# Patient Record
Sex: FEMALE | Race: WHITE | Employment: FULL TIME | URBAN - METROPOLITAN AREA
[De-identification: names, ages, dates, MRNs, and addresses within clinical notes are randomized per-mention and may not be internally consistent; named-entity substitution may affect disease eponyms.]

---

## 2022-04-11 ENCOUNTER — OFFICE VISIT (OUTPATIENT)
Dept: ORTHOPEDIC SURGERY | Age: 37
End: 2022-04-11
Payer: COMMERCIAL

## 2022-04-11 VITALS — BODY MASS INDEX: 25.9 KG/M2 | HEIGHT: 67 IN | WEIGHT: 165 LBS

## 2022-04-11 DIAGNOSIS — M25.511 RIGHT SHOULDER PAIN, UNSPECIFIED CHRONICITY: Primary | ICD-10-CM

## 2022-04-11 DIAGNOSIS — S43.431A SUPERIOR GLENOID LABRUM LESION OF RIGHT SHOULDER, INITIAL ENCOUNTER: ICD-10-CM

## 2022-04-11 DIAGNOSIS — M25.311 SHOULDER INSTABILITY, RIGHT: ICD-10-CM

## 2022-04-11 PROBLEM — E11.65 TYPE 2 DIABETES MELLITUS WITH HYPERGLYCEMIA, WITHOUT LONG-TERM CURRENT USE OF INSULIN (HCC): Status: ACTIVE | Noted: 2021-06-25

## 2022-04-11 PROBLEM — E78.5 HYPERLIPIDEMIA ASSOCIATED WITH TYPE 2 DIABETES MELLITUS (HCC): Status: ACTIVE | Noted: 2021-10-08

## 2022-04-11 PROBLEM — R00.2 PALPITATIONS: Status: ACTIVE | Noted: 2020-01-08

## 2022-04-11 PROBLEM — R00.0 TACHYCARDIA: Status: ACTIVE | Noted: 2020-01-08

## 2022-04-11 PROBLEM — E11.69 HYPERLIPIDEMIA ASSOCIATED WITH TYPE 2 DIABETES MELLITUS (HCC): Status: ACTIVE | Noted: 2021-10-08

## 2022-04-11 PROBLEM — F41.9 ANXIETY: Status: ACTIVE | Noted: 2020-10-06

## 2022-04-11 PROCEDURE — 99203 OFFICE O/P NEW LOW 30 MIN: CPT | Performed by: PHYSICIAN ASSISTANT

## 2022-04-11 PROCEDURE — G8427 DOCREV CUR MEDS BY ELIG CLIN: HCPCS | Performed by: PHYSICIAN ASSISTANT

## 2022-04-11 PROCEDURE — G8419 CALC BMI OUT NRM PARAM NOF/U: HCPCS | Performed by: PHYSICIAN ASSISTANT

## 2022-04-11 PROCEDURE — 1036F TOBACCO NON-USER: CPT | Performed by: PHYSICIAN ASSISTANT

## 2022-04-11 RX ORDER — MELOXICAM 15 MG/1
15 TABLET ORAL DAILY
Qty: 30 TABLET | Refills: 3 | Status: SHIPPED | OUTPATIENT
Start: 2022-04-11 | End: 2022-09-16

## 2022-04-11 RX ORDER — LIOTHYRONINE SODIUM 5 UG/1
5 TABLET ORAL 2 TIMES DAILY
COMMUNITY

## 2022-04-11 RX ORDER — LEVOTHYROXINE SODIUM 0.1 MG/1
100 TABLET ORAL DAILY
COMMUNITY
Start: 2019-06-14

## 2022-04-11 NOTE — PROGRESS NOTES
12 [facility]  History and Physical  Shoulder Pain    Date:  2022    Name:  Juan Pablo Hendrix  Address:  [address] [facility] [address]    :  1985      Age:   31 y.o.    SSN:  [id number]      Medical Record Number:  [id number]    Reason for Visit:    Shoulder Pain (NP RIGHT SHOULDER)      HPI:   Juan Pablo Hendrix is a 31 y.o. female who presents to our office today complaining of  right shoulder pain. Patient is right-handed. She currently works full-time at [employer] and is an avid . Patient reports that she has played volleyball from a very young age and into her teens and she currently plays in a competitive rec league. Patient reports that she has been having persistent right shoulder pain over the years since she was a teenager. Back then she was seen and evaluated and recalls that she got a corticosteroid injection along with formal physical therapy which provided a lot of relief. She was able to finish out her senior year without any problems. She also recalls that she never had an acute dislocation but has been told that her right shoulder is a bit loose. Over the last few years patient reports that she has been seen by the orthopedics at [facility]. She has done extensive formal physical therapy. Patient reports that when she plays volleyball he becomes very aggravated. Initially it used to bother her primarily when she would play volleyball. Now it is becoming more more persistent where is starting to interfere with any sort of repetitive movements that she does. She reports if she is cleaning her home and doing day-to-day activities she has a lot of pain. She reports that most of her pain is deep within her right shoulder. She denies any acute dislocation history however feels that at times it could sublux.   She also reports that she has had 2 regular MRIs and 1 MRI with contrast and all of which has always been normal in the past.  She denies any acute trauma to her right shoulder  Additionally she does have some radicular symptoms that go down her arm into her index finger. She also has some pain that refers up into her neck. She reports the pain to be a dull achy pain. She denies any numbness or tingling. Pain Assessment  Location of Pain: Shoulder  Location Modifiers: Right  Severity of Pain: 2  Quality of Pain: Aching  Frequency of Pain: Intermittent  Aggravating Factors: Other (Comment)  Limiting Behavior: Yes  Relieving Factors: Rest  Work-Related Injury: No  Are there other pain locations you wish to document?: No    Review of Systems:  A 14 point review of systems available in the scanned medical record as documented by the patient. The review is negative with the exception of those things mentioned in the History of Present Illness and Past Medical History. Past History:  No past medical history on file. No past surgical history on file. Current Outpatient Medications on File Prior to Visit   Medication Sig Dispense Refill    cyanocobalamin 1000 MCG tablet Take 1,000 mcg by mouth daily      levothyroxine (SYNTHROID) 100 MCG tablet Take 100 mcg by mouth daily      liothyronine (CYTOMEL) 5 MCG tablet Take 5 mcg by mouth in the morning and at bedtime       No current facility-administered medications on file prior to visit. Social History     Socioeconomic History    Marital status: Unknown     Spouse name: Not on file    Number of children: Not on file    Years of education: Not on file    Highest education level: Not on file   Occupational History    Not on file   Tobacco Use    Smoking status: Never Smoker    Smokeless tobacco: Never Used   Substance and Sexual Activity    Alcohol use:  Yes    Drug use: Never    Sexual activity: Not on file   Other Topics Concern    Not on file   Social History Narrative    Not on file     Social Determinants of Health Financial Resource Strain:     Difficulty of Paying Living Expenses: Not on file   Food Insecurity:     Worried About Running Out of Food in the Last Year: Not on file    Anival of Food in the Last Year: Not on file   Transportation Needs:     Lack of Transportation (Medical): Not on file    Lack of Transportation (Non-Medical): Not on file   Physical Activity:     Days of Exercise per Week: Not on file    Minutes of Exercise per Session: Not on file   Stress:     Feeling of Stress : Not on file   Social Connections:     Frequency of Communication with Friends and Family: Not on file    Frequency of Social Gatherings with Friends and Family: Not on file    Attends Restoration Services: Not on file    Active Member of [address] [place] [address] [employer] or Organizations: Not on file    Attends Club or Organization Meetings: Not on file    Marital Status: Not on file   Intimate Partner Violence:     Fear of Current or Ex-Partner: Not on file    Emotionally Abused: Not on file    Physically Abused: Not on file    Sexually Abused: Not on file   Housing Stability:     Unable to Pay for Housing in the Last Year: Not on file    Number of [place] in the Last Year: Not on file    Unstable Housing in the Last Year: Not on file     No family history on file. Current Medications:    Current Outpatient Medications   Medication Sig Dispense Refill    cyanocobalamin 1000 MCG tablet Take 1,000 mcg by mouth daily      levothyroxine (SYNTHROID) 100 MCG tablet Take 100 mcg by mouth daily      liothyronine (CYTOMEL) 5 MCG tablet Take 5 mcg by mouth in the morning and at bedtime      meloxicam (MOBIC) 15 MG tablet Take 1 tablet by mouth daily 30 tablet 3     No current facility-administered medications for this visit. Allergies: Allergies   Allergen Reactions    Cefaclor Hives     As a child  As a child         Physical Exam:  There were no vitals filed for this visit.   General: Stuart Landeros is a healthy and well appearing 31 y.o. female who is sitting comfortably in our office in acute distress. General Exam:   Constitutional: Patient is adequately groomed with no evidence of malnutrition  DTRs: Deep tendon reflexes are intact  Mental Status: The patient is oriented to time, place and person. The patient's mood and affect are appropriate. Lymphatic: The lymphatic examination bilaterally reveals all areas to be without enlargement or induration. Vascular: Examination reveals no swelling or calf tenderness. Peripheral pulses are palpable and 2+. Neurological: The patient has good coordination. There is no weakness or sensory deficit. Neuro: alert. Oriented X 3  Eyes: Extra-ocular muscles intact  Mouth: Oral mucosa moist. No perioral lesions  Pulm: Respirations unlabored and regular. right Shoulder Exam:  Inspection:  No gross deformities, no signs of infection. Palpation: She has no tenderness over the rotator cuff footprint, bicipital groove, AC joint or posterior joint line. She does have a posterior click with passive movement. Active Range of Motion: Forward elevation of 160, abduction of 160, external rotation with elbow at the side 50, internal rotation to the back is T7    Passive Range of Motion: Similar to active. Strength:  5/5 external/internal rotation with resistance    Special Tests: Positive dynamic shear, positive relocation test, pain with apprehension test, negative [place]'s, negative speeds, negative resisted throwing test, no Cade muscle deformity. Neurovascular: Sensation to light touch is intact, no motor deficits, palpable radial pulses 2+  Comparison left Shoulder Examination:    Additional Examinations:    Examination of the contralateral extremity does not show any tenderness, deformity or injury. Range of motion is unremarkable. There is no gross instability. There are no rashes, ulcerations or lesions.  Strength and tone are normal.    Laboratory:  No results found for any previous visit. No results found for this or any previous visit (from the past 24 hour(s)). Radiographic:  3 xray views of the right  shoulder including True AP in internal and external and axillary lateral were taken in our office today reveal no fractures, dislocations, visible tumors, or signs of acute trauma. Self assessment questionnaires including ASES and Simple Shoulder Test were completed today. Assessment:  Valeri Cheatham is a 31 y.o. female with right shoulder pain and microinstability related to a labrum pathology. Differential diagnosis includes a SLAP lesion. Impression:  Encounter Diagnoses   Name Primary?  Right shoulder pain, unspecified chronicity Yes    Shoulder instability, right     Superior glenoid labrum lesion of right shoulder, initial encounter        Office Procedures:  Orders Placed This Encounter   Procedures    XR SHOULDER RIGHT (MIN 2 VIEWS)     Standing Status:   Future     Number of Occurrences:   1     Standing Expiration Date:   4/11/2023     Order Specific Question:   Reason for exam:     Answer:   pain    MRI SHOULDER RIGHT WO CONTRAST     Standing Status:   Future     Standing Expiration Date:   4/11/2023     Scheduling Instructions:      PROSCAN      PUSH TO [employer] PACS     Order Specific Question:   Reason for exam:     Answer:   EVAL RCT VS SLAP     Order Specific Question:   What is the sedation requirement? Answer:   None       Plan:   Pertinent imaging was reviewed. The etiology, natural history, and treatment options for the disorder were discussed. The roles of activity modifications, medications, cryotherapy and heat, injections, physical therapy, and surgical interventions were all described to the patient and questions were answered. I had a lengthy discussion with the patient today. We recommend that the patient continue to do the exercises that she had learned in physical therapy.   We will go ahead and call in a prescription for meloxicam for the pain and discomfort that she has been experiencing. Additionally we would like to get a more updated MRI scan to evaluate for a labrum pathology. Once this is completed we will see her back in our office to review the results of the imaging      4/11/2022  1:33 PM      Norma Miranda, [address] [place] [address] Physician Assistant    This dictation was performed with a verbal recognition program [facility]) and it was checked for errors. It is possible that there are still dictated errors within this office note. If so, please bring any errors to my attention for an addendum. All efforts were made to ensure that this office note is accurate.

## 2022-04-28 ENCOUNTER — OFFICE VISIT (OUTPATIENT)
Dept: ORTHOPEDIC SURGERY | Age: 37
End: 2022-04-28
Payer: COMMERCIAL

## 2022-04-28 VITALS — WEIGHT: 165 LBS | HEIGHT: 67 IN | BODY MASS INDEX: 25.9 KG/M2

## 2022-04-28 DIAGNOSIS — S43.431A SUPERIOR GLENOID LABRUM LESION OF RIGHT SHOULDER, INITIAL ENCOUNTER: Primary | ICD-10-CM

## 2022-04-28 DIAGNOSIS — M25.511 RIGHT SHOULDER PAIN, UNSPECIFIED CHRONICITY: ICD-10-CM

## 2022-04-28 DIAGNOSIS — M75.21 BICEPS TENDINITIS OF RIGHT UPPER EXTREMITY: ICD-10-CM

## 2022-04-28 PROCEDURE — G8427 DOCREV CUR MEDS BY ELIG CLIN: HCPCS | Performed by: ORTHOPAEDIC SURGERY

## 2022-04-28 PROCEDURE — G8419 CALC BMI OUT NRM PARAM NOF/U: HCPCS | Performed by: ORTHOPAEDIC SURGERY

## 2022-04-28 PROCEDURE — 20610 DRAIN/INJ JOINT/BURSA W/O US: CPT | Performed by: ORTHOPAEDIC SURGERY

## 2022-04-28 PROCEDURE — 1036F TOBACCO NON-USER: CPT | Performed by: ORTHOPAEDIC SURGERY

## 2022-04-28 PROCEDURE — 99213 OFFICE O/P EST LOW 20 MIN: CPT | Performed by: ORTHOPAEDIC SURGERY

## 2022-04-28 RX ORDER — AMOXICILLIN AND CLAVULANATE POTASSIUM 875; 125 MG/1; MG/1
TABLET, FILM COATED ORAL
COMMUNITY
Start: 2022-04-21 | End: 2022-08-02

## 2022-04-28 RX ORDER — LIDOCAINE HYDROCHLORIDE 10 MG/ML
8 INJECTION, SOLUTION INFILTRATION; PERINEURAL ONCE
Status: COMPLETED | OUTPATIENT
Start: 2022-04-28 | End: 2022-04-28

## 2022-04-28 RX ORDER — TRIAMCINOLONE ACETONIDE 40 MG/ML
80 INJECTION, SUSPENSION INTRA-ARTICULAR; INTRAMUSCULAR ONCE
Status: COMPLETED | OUTPATIENT
Start: 2022-04-28 | End: 2022-04-28

## 2022-04-28 RX ADMIN — TRIAMCINOLONE ACETONIDE 80 MG: 40 INJECTION, SUSPENSION INTRA-ARTICULAR; INTRAMUSCULAR at 16:20

## 2022-04-28 RX ADMIN — LIDOCAINE HYDROCHLORIDE 8 ML: 10 INJECTION, SOLUTION INFILTRATION; PERINEURAL at 16:19

## 2022-04-28 NOTE — LETTER
Physical Therapy Rehabilitation Referral    Patient Name:  Juan Humphries      YOB: 1985    Diagnosis:    1. Superior glenoid labrum lesion of right shoulder, initial encounter    2. Right shoulder pain, unspecified chronicity    3. Biceps tendinitis of right upper extremity        Precautions:     [x] Evaluate and Treat    Post Op Instructions:  [] Continuous passive motion (CPM)  [] Elbow ROM  [x] Exercise in plane of scapula  []  Strengthening     [x] Pulley and instruction   [x] Home exercise program (copy to patient)   [] Sling when arm at risk  [] Sling or brace at all times   [] AAROM: Forward elevation to  140            [] AAROM: External rotation  To  40    [] Isometric external rotator strengthening [] AAROM: internal rotation: up the back  [x] Isometric abductor strengthening  [] AAROM: Internal abduction   [] Isometric internal rotator strengthening [] AAROM: cross-body adduction             Stretching:     Strengthening:  [x] Four quadrant (FE, ER, IR, CBA)  [x] Rotator cuff (ER, IR, Abd)  [] Forward Elevation    [] External Rotators     [] External Rotation    [] Internal Rotators  [] Internal Rotation: up/back   [] Abductors     [] Internal Rotation: supine in abduction  [] Sleeper Stretch    [] Flexors  [] Cross-body abduction    [] Extensors  [] Pendulum (FE, Abd/Add, cw/ccw)  [x] Scapular Stabilizers   [] Wall-walking (FE, Abd)        [x] Shoulder shrugs     [] Table slides (FE)                [x] Rhomboid pinch  [] Elbow (flex, ext, pron, sup)        [x] Lat.  Pull downs     [] Medial epicondylitis program       [x] Forward punch   [] Lateral epicondylitis program       [x] Internal rotators     [] Progressive resistive exercises  [] Bench Press        [] Bench press plus  Activities:     [] Lateral pull-downs  [] Rowing     [] Progressive two-hand supine press  [] Stepper/Exercise bike   [x] Biceps: curls/supination  [] Swimming  [] Water exercises    Modalities:     Return to Sport:  [x] Of Choice      [] Plyometrics  [] Ultrasound     [] Rhythmic stabilization  [] Iontophoresis    [] Core strengthening   [] Moist heat     [] Sports specific program:   [] Massage         [x] Cryotherapy      [] Electrical stimulation     [] Paraffin  [] Whirlpool  [] TENS    [x] Home exercise program (copy to patient). Perform exercises for:   15     minutes    3      times/day  [x] Supervised physical therapy  Frequency: []  1x week  [x] 2x week  [] 3x week  [] Other:   Duration: [] 2 weeks   [] 4 weeks  [x] 6 weeks  [] Other:     Additional Instructions:     Ivette Ruvalcaba MD, PhD

## 2022-04-28 NOTE — PROGRESS NOTES
[facility]  History and Physical  Shoulder Pain    Date:  2022    Name:  Daly Last  Address:  [place] [address]    :  1985      Age:   31 y.o.    SSN:  [id number]      Medical Record Number:  [id number]    Reason for Visit:    Shoulder Pain (F/U RIGHT SHOULDER MRI)      HPI:   Daly Last is a 31 y.o. female who presents to our office today for follow up of the right shoulder pain. She presents today to review the results of her MRI. There is concern that she may have had a SLAP lesion was asked to obtain the MRI. She is a very active individual and has been having increased pain especially with any increased activities. She denies any new injury since her last visit with us    HPI 2022  Patient is right-handed. She currently works full-time at [employer] and is an avid . Patient reports that she has played volleyball from a very young age and into her teens and she currently plays in a competitive rec league. Patient reports that she has been having persistent right shoulder pain over the years since she was a teenager. Back then she was seen and evaluated and recalls that she got a corticosteroid injection along with formal physical therapy which provided a lot of relief. She was able to finish out her senior year without any problems. She also recalls that she never had an acute dislocation but has been told that her right shoulder is a bit loose. Over the last few years patient reports that she has been seen by the orthopedics at [facility]. She has done extensive formal physical therapy. Patient reports that when she plays volleyball he becomes very aggravated. Initially it used to bother her primarily when she would play volleyball. Now it is becoming more more persistent where is starting to interfere with any sort of repetitive movements that she does.   She reports if she is cleaning her home and doing day-to-day activities she has a lot of pain. She reports that most of her pain is deep within her right shoulder. She denies any acute dislocation history however feels that at times it could sublux. She also reports that she has had 2 regular MRIs and 1 MRI with contrast and all of which has always been normal in the past.  She denies any acute trauma to her right shoulder    Additionally she does have some radicular symptoms that go down her arm into her index finger. She also has some pain that refers up into her neck. She reports the pain to be a dull achy pain. She denies any numbness or tingling. Pain Assessment  Location of Pain: Shoulder  Location Modifiers: Right  Severity of Pain: 1  Quality of Pain: Aching  Duration of Pain: Persistent  Frequency of Pain: Intermittent  Aggravating Factors: Other (Comment),Exercise,Straightening,Stretching  Limiting Behavior: Some  Relieving Factors: Rest  Work-Related Injury: No  Are there other pain locations you wish to document?: No    No notes on file    Review of Systems:  A 14 point review of systems available in the scanned medical record as documented by the patient. The review is negative with the exception of those things mentioned in the History of Present Illness and Past Medical History. Past Medical History:  Patient's medications, allergies, past medical, surgical, social and family histories were reviewed and updated as appropriate. Allergies: Allergies   Allergen Reactions    Cefaclor Hives     As a child  As a child         Physical Exam:  There were no vitals filed for this visit. General: Derian Tolentino is a healthy and well appearing 31 y.o. female who is sitting comfortably in our office in acute distress. Skin:  There are no skin lesions, cellulitis, or extreme edema. The patient has warm and well-perfused Bilateral upper extremities with brisk capillary refill.     Eyes: Extra-ocular muscles intact  Mouth: Oral mucosa moist. No perioral lesions  Pulm: Respirations unlabored and regular. Neuro: Alert and oriented x3    right Shoulder Exam:  Inspection:  No gross deformities, no signs of infection. Palpation:  Positive bicipital groove, negative tenderness     Active Range of Motion: Forward elevation of 160, abduction of 160, external rotation with elbow at the side 50, internal rotation to the back is T8    Passive Range of Motion: Passively forward elevation can be further increased to . Strength: External rotation with resistance with elbow at the side 5/5, internal rotation with resistance with elbow at the side 5/5,  5/5 champagne toast test/jobes    Special Tests: +1 anterior drawer test, +2 posterior drawer, 1+ inferior drawer, 1+ sulcus and it estinguesis with external rotiton, positive obrians, positive jobes, positive resisted throwing test, positive dynamic santos No Cade muscle deformity. Positive relocation with apprehension. Neurovascular: Sensation to light touch is intact, no motor deficits, palpable radial pulses 2+    left Shoulder Exam:  Inspection:  No gross deformities, no signs of infection. Palpation:  No tenderness over the bicipital groove and rotator cuff tear     Active Range of Motion: Forward elevation of 160, abduction of 160, external rotation with elbow at the side 50, internal rotation to the back is T8    Passive Range of Motion: deferred  Strength: External rotation with resistance with elbow at the side 5/5, internal rotation with resistance with elbow at the side 5/5,  5/5 champagne toast test/jobes    Special Tests: +1 anterior drawer test, +2 posterior drawer, 1+ inferior drawer, 1+ sulcus and it estinguesis with external rotiton, positive obrians, positive jobes, positive resisted throwing test, positive dynamic santos No Cade muscle deformity. Positive relocation with apprehension.      Neurovascular: Sensation to light touch is intact, no motor deficits, palpable radial pulses 2+    Radiographic:  MRI right shoulder 4/23/2022     FINDINGS:  Supraspinatus tendon is intact.       Mild/moderate infraspinatus tendinopathy.  Internal impingement-type pitting and pseudocysts of    the posterior humerus.       Otherwise, no substantial muscle atrophy.       Long biceps pulley demonstrates an intact long biceps tendon. The long biceps tendon is    anatomically positioned.  The subscapularis tendon is intact.  Delicate posterior superior    labral tear favored over isolated synovitis, 3-6 mm.  Subjacent focal synovitis versus    ill-defined paralabral cyst paralleling the free edge tip of the labrum.       Teres minor tendon is intact.       Mild AC joint capsulitis.  The acromion is unremarkable.  Coracoid is unremarkable.       No notable arthritis of the glenohumeral joint.       Capsulitis and bursitis.       Remaining muscle bulk is unremarkable.       Remaining tendons are unremarkable.       No acute OCD.  No acute fracture.  No AVN.  No large loose body.       CONCLUSION:   1. Delicate posterior superior labral tear favored over isolated synovitis, 3-6 mm.   2. Capsulitis and bursitis. Assessment:  Ruy Sender is a 31 y.o. female a left shoulder pain related to a SLAP lesion with underlying mild capsulitis. Impression:  Encounter Diagnoses   Name Primary?     Superior glenoid labrum lesion of right shoulder, initial encounter Yes    Right shoulder pain, unspecified chronicity     Biceps tendinitis of right upper extremity        Office Procedures:  Orders Placed This Encounter   Procedures   [address] [facility]     Referral Priority:   Routine     Referral Type:   Eval and Treat     Referral Reason:   Specialty Services Required     Requested Specialty:   Physical Therapy     Number of Visits Requested:   1    ND ARTHROCENTESIS ASPIR&/INJ MAJOR JT/BURSA W/O US     After discussing the risks and benefits of a corticosteroid injection, Su Quinn did state an understanding and gave verbal consent to proceed. After cleansing the injection site with Chlora-prep and using aseptic techniques,  2 CC of Kenalog-40mg/ml and 8 CC of 1% lidocaine were injected in the right glenohumeral space. She  tolerated the procedure well with no immediate adverse sequelae after the injection. A bandage was placed over the injection site. Appropriate post injections instructions were given to the patient. Plan: We were able to review the MRI with the [facility] today. Her clinical exam and her MRI continue to correlate showing a SLAP lesion. She does have some underlying inflammatory changes and at this time we recommend that she start with a trial of conservative treatment. This includes doing formal physical therapy focusing on strength and flexibility. The patient may benefit from a corticosteroid injection which she was agreeable to today. May continue take her meloxicam daily. We will see her back in 4 weeks for reassessment. If she continues to have persistent symptoms we can certainly consider arthroscopic labral repair with biceps tenodesis at that point. She was agreeable to the plan. All questions were fully answered today      4/28/2022  4:07 PM      Paulette Day PA-C  Orthopaedic Sports Medicine Physician Assistant    During this examination, I, Paulette Day PA-C, functioned as a scribe for Dr. Natalie Kelly. This dictation was performed with a verbal recognition program ([employer]) and it was checked for errors. It is possible that there are still dictated errors within this office note. If so, please bring any errors to my attention for an addendum.   All efforts were made to ensure that this office note is accurate.  ____________  FOZIA, Dr. Natalie Kelly, personally performed the services described in this documentation as described by Paulette Day PA-C in my presence, and it is both accurate and complete. Ivette Luong MD, PhD  4/28/2022

## 2022-05-03 ENCOUNTER — HOSPITAL ENCOUNTER (OUTPATIENT)
Dept: PHYSICAL THERAPY | Age: 37
Setting detail: THERAPIES SERIES
Discharge: HOME | End: 2022-05-03
Payer: COMMERCIAL

## 2022-05-03 PROCEDURE — 97110 THERAPEUTIC EXERCISES: CPT | Performed by: PHYSICAL THERAPIST

## 2022-05-03 PROCEDURE — 97161 PT EVAL LOW COMPLEX 20 MIN: CPT | Performed by: PHYSICAL THERAPIST

## 2022-05-03 NOTE — FLOWSHEET NOTE
[facility] and [address], [facility], [address]  Phone: [phone]  Fax [phone]    Physical Therapy Daily Treatment Note  Date:  5/3/2022    Patient Name:  Javon Davidson    :  1985  MRN: [id number]  Restrictions/Precautions:    Medical/Treatment Diagnosis Information:  · Diagnosis: S43.431A (ICD-10-CM) - Superior glenoid labrum lesion of right shoulder, initial encounter; M25.511 (ICD-10-CM) - Right shoulder pain, unspecified chronicity; M75.21 (ICD-10-CM) - Biceps tendinitis of right upper extremity  · Treatment Diagnosis: increased pain M25.511; decreased strength; poor posture  Insurance/Certification information:  PT Insurance Information: [facility]  Physician Information:   Vonnie Martínez MD  Has the plan of care been signed (Y/N):        []  Yes  [x]  No     Date of Patient follow up with Physician:  22      Is this a Progress Report:     []  Yes  [x]  No        If Yes:  Date Range for reporting period:  Beginning 5/3/22  Ending     Progress report will be due (10 Rx or 30 days whichever is less): 97      Recertification will be due (POC Duration  / 90 days whichever is less):  8/3/22        Visit # Insurance Allowable Auth Required   1  []  Yes []  No        Functional Scale: FOTO= 70   Date assessed:  5/3/22    Latex Allergy:  [x]NO      []YES  Preferred Language for Healthcare:   [x]English       []other:    Pain level:  4/7/10     SUBJECTIVE:  See eval    OBJECTIVE: See eval   Observation:    Test measurements:      RESTRICTIONS/PRECAUTIONS:  Labral tear    Exercises/Interventions:     Exercise/Equipment Resistance/Repetitions Other comments   Stretching/PROM     Wand     Table Slides     UE Climax     Pulleys     Pendulum          Isometrics     Retraction          Weight shift     Flexion     Abduction     External Rotation     Internal Rotation     Biceps     Triceps          PRE's     Flexion Abduction     External Rotation 3x10    Internal Rotation     Shrugs     EXT 2# 3x10    Reverse Flys     Serratus 2# 3x10    Horizontal Abd with ER Bilateral 3x10    Biceps     Triceps     Retraction Prone row 2# 3x10         Cable Column/Theraband     External Rotation     Internal Rotation Blue band 3x10    Shrugs     Lats     Ext     Flex     Scapular Retraction     BIC     TRIC     PNF          Dynamic Stability     Serratus at wall Foam roll blue band/wrist 3x10   Plyoback          Manual interventions                 Patient Education:  Access Code: [email or web address]  URL: [email or web address]  Date: 05/03/2022  Prepared by: Delilah Johns    Exercises  Sidelying Shoulder External Rotation - 1 x daily - 7 x weekly - 3 sets - 10 reps  Supine Scapular Protraction in Flexion with Dumbbells - 1 x daily - 7 x weekly - 3 sets - 10 reps  Prone Shoulder Extension - Single Arm - 1 x daily - 7 x weekly - 3 sets - 10 reps  Prone Shoulder Row - 1 x daily - 7 x weekly - 3 sets - 10 reps  Prone Shoulder Horizontal Abduction with Thumbs Up - 1 x daily - 7 x weekly - 3 sets - 10 reps  Shoulder Internal Rotation with Resistance - 1 x daily - 7 x weekly - 3 sets - 10 reps  Serratus Activation at Wall with Foam Roll and Resistance Band - 1 x daily - 7 x weekly - 3 sets - 10 reps      Therapeutic Exercise and NMR EXR  [x] (40004) Provided verbal/tactile cueing for activities related to strengthening, flexibility, endurance, ROM  for improvements in scapular, scapulothoracic and UE control with self care, reaching, carrying, lifting, house/yardwork, driving/computer work.    [] (67500) Provided verbal/tactile cueing for activities related to improving balance, coordination, kinesthetic sense, posture, motor skill, proprioception  to assist with  scapular, scapulothoracic and UE control with self care, reaching, carrying, lifting, house/yardwork, driving/computer work.     Therapeutic Activities:    [] (38877 or 07174) Provided verbal/tactile cueing for activities related to improving balance, coordination, kinesthetic sense, posture, motor skill, proprioception and motor activation to allow for proper function of scapular, scapulothoracic and UE control with self care, carrying, lifting, driving/computer work. Home Exercise Program:    [x] (93681) Reviewed/Progressed HEP activities related to strengthening, flexibility, endurance, ROM of scapular, scapulothoracic and UE control with self care, reaching, carrying, lifting, house/yardwork, driving/computer work  [] (23443) Reviewed/Progressed HEP activities related to improving balance, coordination, kinesthetic sense, posture, motor skill, proprioception of scapular, scapulothoracic and UE control with self care, reaching, carrying, lifting, house/yardwork, driving/computer work      Manual Treatments:  PROM / STM / Oscillations-Mobs:  G-I, II, III, IV (PA's, Inf., Post.)  [] (76627) Provided manual therapy to mobilize soft tissue/joints of cervical/CT, scapular GHJ and UE for the purpose of modulating pain, promoting relaxation,  increasing ROM, reducing/eliminating soft tissue swelling/inflammation/restriction, improving soft tissue extensibility and allowing for proper ROM for normal function with self care, reaching, carrying, lifting, house/yardwork, driving/computer work    Modalities:  Ice x 15 min    Charges:  Timed Code Treatment Minutes: 25   Total Treatment Minutes: 60       [x] EVAL (LOW) 52484 (typically 20 minutes face-to-face)  [] EVAL (MOD) 94497 (typically 30 minutes face-to-face)  [] EVAL (HIGH) 00973 (typically 45 minutes face-to-face)  [] RE-EVAL     [x] ER(55858) x  2   [] IONTO  [] NMR (22718) x     [] VASO  [] Manual (27352) x      [] Other:  [] TA x      [] Mech Traction (36980)  [] ES(attended) (77703)      [] ES (un) (50742):       GOALS:   Patient stated goal: pain decrease after activity. Cleaning and volleyball. []? Progressing: []? Met: []?  Not Met: []? Adjusted    Therapist goals for Patient:   Short Term Goals: To be achieved in: 2 weeks  1. Independent in HEP and progression per patient tolerance, in order to prevent re-injury. []? Progressing: []? Met: []? Not Met: []? Adjusted   2. Patient will have a decrease in pain to facilitate improvement in movement, function, and ADLs as indicated by Functional Deficits. []? Progressing: []? Met: []? Not Met: []? Adjusted     Long Term Goals: To be achieved in: 8 weeks  1. Disability index score of  80 or higher to assist with reaching prior level of function. []? Progressing: []? Met: []? Not Met: []? Adjusted  2. Patient will demonstrate increased AROM to WNL to allow for proper joint functioning as indicated by patients Functional Deficits. []? Progressing: []? Met: []? Not Met: []? Adjusted  3. Patient will demonstrate an increase in strength to good scapular and core control  for UE to allow for proper functional mobility as indicated by patients Functional Deficits. []? Progressing: []? Met: []? Not Met: []? Adjusted  4. Patient will return to household functional activities without increased symptoms or restriction. []? Progressing: []? Met: []? Not Met: []? Adjusted  5. Pt will have less pain after playing volleyball. []? Progressing: []? Met: []? Not Met: []? Adjusted    Overall Progression Towards Functional goals/ Treatment Progress Update:  [] Patient is progressing as expected towards functional goals listed. [] Progression is slowed due to complexities/Impairments listed. [] Progression has been slowed due to co-morbidities.   [x] Plan just implemented, too soon to assess goals progression <30days   [] Goals require adjustment due to lack of progress  [] Patient is not progressing as expected and requires additional follow up with physician  [] Other    Prognosis for POC: [x] Good [] Fair  [] Poor      Patient requires continued skilled intervention: [x] Yes  [] No    Treatment/Activity Tolerance:  [x] Patient able to complete treatment  [x] Patient limited by fatigue  [x] Patient limited by pain     [] Patient limited by other medical complications  [] Other:       PLAN: See eval  [] Continue per plan of care [] Alter current plan (see comments above)  [x] Plan of care initiated [] Hold pending MD visit [] Discharge      Electronically signed by:  Gabriel Dwyer PT    Note: If patient does not return for scheduled/ recommended follow up visits, this note will serve as a discharge from care along with most recent update on progress.

## 2022-05-03 NOTE — PLAN OF CARE
[facility] and [address], [address]  Phone: [phone]  Fax [phone]       Physical Therapy Certification    Dear  Dr Michelet Turner,    We had the pleasure of evaluating the following patient for physical therapy services at [address] [place] [address]. A summary of our findings can be found in the initial assessment below. This includes our plan of care. If you have any questions or concerns regarding these findings, please do not hesitate to contact me at the office phone number checked above. Thank you for the referral.       Physician Signature:_______________________________Date:__________________  By signing above (or electronic signature), therapists plan is approved by physician      Patient: Chitra Sparks   : 1985   MRN: [id number]  Referring Physician:        Evaluation Date: 5/3/2022      Medical Diagnosis Information:  Diagnosis: J79.721A (ICD-10-CM) - Superior glenoid labrum lesion of right shoulder, initial twanipjrxH66.511 (ICD-10-CM) - Right shoulder pain, unspecified vgqfpnvvlpR60.21 (ICD-10-CM) - Biceps tendinitis of right upper extremity   Treatment Diagnosis: increased pain M25.511; decreased strength; poor posture                                         Insurance information: PT Insurance Information: [facility]    Precautions/ Contra-indications:     C-SSRS Triggered by Intake questionnaire (Past 2 wk assessment):   [x] No, Questionnaire did not trigger screening.   [] Yes, Patient intake triggered further evaluation      [] C-SSRS Screening completed  [] PCP notified via Plan of Care  [] Emergency services notified     Latex Allergy:  [x]NO      []YES  Preferred Language for Healthcare:   [x]English       []other:    SUBJECTIVE: Patient stated complaint: Pt has had right shoulder pain since playing competitive volleyball in high school.  She had increased pain at the end of March when the recreational season ended. She has pain with serving and spiking the ball. Most of the time the soreness occurs hours after the activity. Sidelying position is painful. Pt c/o's a burning, sharp pain. Pain is located in the anterior shoulder, scapula, and neck. She notices occasional sensation changes. Clicking occurred with MD special tests. +MRI labral tear. Pt is right handed. She is taking Meloxicam. Pt had a cortisone injection.      Relevant Medical History: gestational pre-diabetes  Functional Disability Index: FOTO= 70    Pain Scale: 4/7/10  Easing factors:   Provocative factors: sleeping, OH movements, cleaning, sports, position changes, reaching, lifting    Type: []Constant   [x]Intermittent  []Radiating []Localized []other:     Numbness/Tingling: occasional    Occupation/School: employed full time [employer]    Living Status/Prior Level of Function: Independent with ADLs and IADLs    OBJECTIVE:     ROM PROM AROM  Comment    L R L R    Flexion    WNL    Abduction    WNL    ER  91      IR  60      Other  (cervical)        Other             Strength L R Comment   Flexion      Abduction      ER  4/5    IR  4/5    Supraspinatus      Upper Trap      Lower Trap      Mid Trap      Rhomboids      Biceps      Triceps      Horizontal Abduction      Horizontal Adduction      Lats        Special Tests Results/Comment   Rain-[place]    Neers    Speeds    [place]ri[place]    Apprehension     Load & Shift         Reflexes/Sensation:               [x]Dermatomes/Myotomes intact               []Reflexes equal and normal bilaterally               []Other:     Joint mobility:               [x]Normal               []Hypo              []Hyper     Palpation: TTP anterior shoulder and UT     Functional Mobility/Transfers: WFL     Posture: rounded shoulders, poor scapular position due to weakness, minimal kyphosis; increased lumbar lordosis     Bandages/Dressings/Incisions: NA     Gait: WNL     Orthopedic Special Tests: [x] Patient history, allergies, meds reviewed. Medical chart reviewed. See intake form. Review Of Systems (ROS):  [x]Performed Review of systems (Integumentary, CardioPulmonary, Neurological) by intake and observation. Intake form has been scanned into medical record. Patient has been instructed to contact their primary care physician regarding ROS issues if not already being addressed at this time. Co-morbidities/Complexities (which will affect course of rehabilitation):   [x]None              Arthritic conditions   []Rheumatoid arthritis (M05.9)  []Osteoarthritis (M19.91)    Cardiovascular conditions   []Hypertension (I10)  []Hyperlipidemia (E78.5)  []Angina pectoris (I20)  []Atherosclerosis (I70)    Musculoskeletal conditions   []Disc pathology   []Congenital spine pathologies   []Prior surgical intervention  []Osteoporosis (M81.8)  []Osteopenia (M85.8)   Endocrine conditions   []Hypothyroid (E03.9)  []Hyperthyroid Gastrointestinal conditions   []Constipation (C67.54)    Metabolic conditions   []Morbid obesity (E66.01)  []Diabetes type 1(E10.65) or 2 (E11.65)   []Neuropathy (G60.9)      Pulmonary conditions   []Asthma (J45)  []Coughing   []COPD (J44.9)    Psychological Disorders  []Anxiety (F41.9)  []Depression (F32.9)   []Other:    [x]Other:      chronic shoulder pain; labral tear      Barriers to/and or personal factors that will affect rehab potential:              []Age  []Sex              []Motivation/Lack of Motivation                        []Co-Morbidities              []Cognitive Function, education/learning barriers              []Environmental, home barriers              []profession/work barriers  []past PT/medical experience  []other:  Justification: Pt has a good rehab potential.      Falls Risk Assessment (30 days):   [x] Falls Risk assessed and no intervention required.   [] Falls Risk assessed and Patient requires intervention due to being higher risk   TUG score (>12s at risk): [] Falls education provided, including     ASSESSMENT:   Functional Impairments              []Noted spinal or UE joint hypomobility              []Noted spinal or UE joint hypermobility              []Decreased UE functional ROM              [x]Decreased UE functional strength              []Abnormal reflexes/sensation/myotomal/dermatomal deficits              [x]Decreased RC/scapular/core strength and neuromuscular control              []other:       Functional Activity Limitations (from functional questionnaire and intake)              [x]Reduced ability to tolerate prolonged functional positions              [x]Reduced ability or difficulty with changes of positions or transfers between positions              []Reduced ability to maintain good posture and demonstrate good body mechanics with sitting, bending, and lifting              [] Reduced ability or tolerance with driving and/or computer work              [x]Reduced ability to sleep              [x]Reduced ability to perform lifting, reaching, carrying tasks              []Reduced ability to tolerate impact through UE              [x]Reduced ability to reach behind back              []Reduced ability to  or hold objects              []Reduced ability to throw or toss an object              []other:       Participation Restrictions              []Reduced participation in self care activities              [x]Reduced participation in home management activities              []Reduced participation in work activities              [x]Reduced participation in social activities. []Reduced participation in sport / recreational activities. Classification:              []Signs/symptoms consistent with post-surgical status including decreased ROM, strength and function.   []Signs/symptoms consistent with joint sprain/strain              []Signs/symptoms consistent with shoulder impingement              []Signs/symptoms consistent with shoulder/elbow/wrist tendinopathy              []Signs/symptoms consistent with Rotator cuff tear              [x]Signs/symptoms consistent with labral tear              [x]Signs/symptoms consistent with postural dysfunction                         []Signs/symptoms consistent with Glenohumeral IR Deficit - <45 degrees              []Signs/symptoms consistent with facet dysfunction of cervical/thoracic spine                         []Signs/symptoms consistent with pathology which may benefit from Dry needling                []other:      Prognosis/Rehab Potential:                                       []Excellent              [x]Good                 []Fair              []Poor     Tolerance of evaluation/treatment:               []Excellent              [x]Good                 []Fair              []Poor     Physical Therapy Evaluation Complexity Justification  [x] A history of present problem with:  [x] no personal factors and/or comorbidities that impact the plan of care;  []1-2 personal factors and/or comorbidities that impact the plan of care  []3 personal factors and/or comorbidities that impact the plan of care  [x] An examination of body systems using standardized tests and measures addressing any of the following: body structures and functions (impairments), activity limitations, and/or participation restrictions;:  [] a total of 1-2 or more elements   [x] a total of 3 or more elements   [] a total of 4 or more elements   [x] A clinical presentation with:  [x] stable and/or uncomplicated characteristics   [] evolving clinical presentation with changing characteristics  [] unstable and unpredictable characteristics;   [x] Clinical decision making of [x] low, [] moderate, [] high complexity using standardized patient assessment instrument and/or measurable assessment of functional outcome.      [x] EVAL (LOW) 20568 (typically 20 minutes face-to-face)  [] EVAL (MOD) 54225 (typically 30 minutes face-to-face)  [] EVAL (HIGH) 99462 (typically 45 minutes face-to-face)  [] RE-EVAL         PLAN:  Frequency/Duration:  1 days per week for 8 Weeks:  INTERVENTIONS:  [x] Therapeutic exercise including: strength training, ROM, for Upper extremity and core   [x]  NMR activation and proprioception for UE, scap and Core   [x] Manual therapy as indicated for shoulder, scapula and spine to include: Dry Needling/IASTM, STM, PROM, Gr I-IV mobilizations, manipulation. [x] Modalities as needed that may include: thermal agents, E-stim, Biofeedback, US, iontophoresis as indicated  [x] Patient education on joint protection, postural re-education, activity modification, progression of HEP. GOALS:   Patient stated goal: pain decrease after activity. Cleaning and volleyball. [] Progressing: [] Met: [] Not Met: [] Adjusted    Therapist goals for Patient:   Short Term Goals: To be achieved in: 2 weeks  1. Independent in HEP and progression per patient tolerance, in order to prevent re-injury. [] Progressing: [] Met: [] Not Met: [] Adjusted   2. Patient will have a decrease in pain to facilitate improvement in movement, function, and ADLs as indicated by Functional Deficits. [] Progressing: [] Met: [] Not Met: [] Adjusted    Long Term Goals: To be achieved in: 8 weeks  1. Disability index score of  80 or higher to assist with reaching prior level of function. [] Progressing: [] Met: [] Not Met: [] Adjusted  2. Patient will demonstrate increased AROM to WNL to allow for proper joint functioning as indicated by patients Functional Deficits. [] Progressing: [] Met: [] Not Met: [] Adjusted  3. Patient will demonstrate an increase in strength to good scapular and core control  for UE to allow for proper functional mobility as indicated by patients Functional Deficits. [] Progressing: [] Met: [] Not Met: [] Adjusted  4. Patient will return to household functional activities without increased symptoms or restriction.    [] Progressing: [] Met: [] Not Met: [] Adjusted  5. Pt will have less pain after playing volleyball. [] Progressing: [] Met: [] Not Met: [] Adjusted    Electronically signed by:  Barry Garcia PT    Note: If patient does not return for scheduled/ recommended follow up visits, this note will serve as a discharge from care along with most recent update on progress.

## 2022-05-10 ENCOUNTER — HOSPITAL ENCOUNTER (OUTPATIENT)
Dept: PHYSICAL THERAPY | Age: 37
Setting detail: THERAPIES SERIES
Discharge: HOME | End: 2022-05-10
Payer: COMMERCIAL

## 2022-05-10 PROCEDURE — 97112 NEUROMUSCULAR REEDUCATION: CPT | Performed by: PHYSICAL THERAPIST

## 2022-05-10 PROCEDURE — 97110 THERAPEUTIC EXERCISES: CPT | Performed by: PHYSICAL THERAPIST

## 2022-05-10 NOTE — FLOWSHEET NOTE
The [address] [place] [address] [address] [address]  Phone: [phone]  Fax [phone]    Physical Therapy Daily Treatment Note  Date:  5/10/2022    Patient Name:  Pat Robles    :  1985  MRN: [id number]  Restrictions/Precautions:    Medical/Treatment Diagnosis Information:  · Diagnosis: F03.148M (ICD-10-CM) - Superior glenoid labrum lesion of right shoulder, initial encounter; M25.511 (ICD-10-CM) - Right shoulder pain, unspecified chronicity; M75.21 (ICD-10-CM) - Biceps tendinitis of right upper extremity  · Treatment Diagnosis: increased pain M25.511; decreased strength; poor posture  Insurance/Certification information:  PT Insurance Information: [facility]  Physician Information:   Lisa Shirley MD  Has the plan of care been signed (Y/N):        []  Yes  [x]  No     Date of Patient follow up with Physician:  22      Is this a Progress Report:     []  Yes  [x]  No        If Yes:  Date Range for reporting period:  Beginning 5/3/22  Ending     Progress report will be due (10 Rx or 30 days whichever is less): 6/3/97      Recertification will be due (POC Duration  / 90 days whichever is less):  8/3/22        Visit # Insurance Allowable Auth Required   2  []  Yes []  No        Functional Scale: FOTO= 70   Date assessed:  5/3/22    Latex Allergy:  [x]NO      []YES  Preferred Language for Healthcare:   [x]English       []other:    Pain level:      SUBJECTIVE:  Pt has been doing the HEP and using weights. No changes to report today.      OBJECTIVE:    Observation:    Test measurements:      RESTRICTIONS/PRECAUTIONS:  Labral tear    Exercises/Interventions: Right shoulder    Exercise/Equipment Resistance/Repetitions Other comments   Stretching/PROM     Wand     Table Slides     UE [place]     Pulleys     Pendulum          Isometrics     Retraction          Weight shift     Flexion     Abduction     External Rotation Internal Rotation     Biceps     Triceps          PRE's     Flexion     Abduction     External Rotation 3# 3x10    Internal Rotation     Shrugs     EXT 3/3/5# 3x10 With scapular pinch   Reverse Flys     Serratus 3/3/5# 3x10    Horizontal Abd with ER  Prone HABD Bilateral 3x10  3# 3x10    Biceps     Triceps     Retraction Prone row 3# 3x10         Cable Column/Theraband     External Rotation     Internal Rotation Black band 3x10    Shrugs     Lats     Ext     Flex     Scapular Retraction     BIC     TRIC     PNF     Supine HABD Green 3x10     Dynamic Stability     Serratus at wall  Standing plank on wall Foam roll; blue band/wrist  With reaches x5 each side 3x10  Blue band   Plyoback          Manual interventions     Rhym stabilization Neutral scaption [id number] Eyes closed          Patient Education:  Access Code: [email or web address]  URL: [email or web address]  Date: 05/03/2022  Prepared by: Darline Dominique    Exercises  Sidelying Shoulder External Rotation - 1 x daily - 7 x weekly - 3 sets - 10 reps  Supine Scapular Protraction in Flexion with Dumbbells - 1 x daily - 7 x weekly - 3 sets - 10 reps  Prone Shoulder Extension - Single Arm - 1 x daily - 7 x weekly - 3 sets - 10 reps  Prone Shoulder Row - 1 x daily - 7 x weekly - 3 sets - 10 reps  Prone Shoulder Horizontal Abduction with Thumbs Up - 1 x daily - 7 x weekly - 3 sets - 10 reps  Shoulder Internal Rotation with Resistance - 1 x daily - 7 x weekly - 3 sets - 10 reps  Serratus Activation at Wall with Foam Roll and Resistance Band - 1 x daily - 7 x weekly - 3 sets - 10 reps      Therapeutic Exercise and NMR EXR  [x] (73592) Provided verbal/tactile cueing for activities related to strengthening, flexibility, endurance, ROM  for improvements in scapular, scapulothoracic and UE control with self care, reaching, carrying, lifting, house/yardwork, driving/computer work.     [x] (51774) Provided verbal/tactile cueing for activities related to improving balance, coordination, kinesthetic sense, posture, motor skill, proprioception  to assist with  scapular, scapulothoracic and UE control with self care, reaching, carrying, lifting, house/yardwork, driving/computer work. Therapeutic Activities:    [] (25292 or 85126) Provided verbal/tactile cueing for activities related to improving balance, coordination, kinesthetic sense, posture, motor skill, proprioception and motor activation to allow for proper function of scapular, scapulothoracic and UE control with self care, carrying, lifting, driving/computer work.      Home Exercise Program:    [x] (54874) Reviewed/Progressed HEP activities related to strengthening, flexibility, endurance, ROM of scapular, scapulothoracic and UE control with self care, reaching, carrying, lifting, house/yardwork, driving/computer work  [x] (16832) Reviewed/Progressed HEP activities related to improving balance, coordination, kinesthetic sense, posture, motor skill, proprioception of scapular, scapulothoracic and UE control with self care, reaching, carrying, lifting, house/yardwork, driving/computer work      Manual Treatments:  PROM / STM / Oscillations-Mobs:  G-I, II, III, IV (PA's, Inf., Post.)  [] (62821) Provided manual therapy to mobilize soft tissue/joints of cervical/CT, scapular GHJ and UE for the purpose of modulating pain, promoting relaxation,  increasing ROM, reducing/eliminating soft tissue swelling/inflammation/restriction, improving soft tissue extensibility and allowing for proper ROM for normal function with self care, reaching, carrying, lifting, house/yardwork, driving/computer work    Modalities:  Ice x 15 min    Charges:  Timed Code Treatment Minutes: 50   Total Treatment Minutes: 65       [] EVAL (LOW) 96090 (typically 20 minutes face-to-face)  [] EVAL (MOD) 44795 (typically 30 minutes face-to-face)  [] EVAL (HIGH) 77531 (typically 45 minutes face-to-face)  [] RE-EVAL     [x] MA(32562) x  2   [] IONTO  [x] NMR (55012) x 1   [] VASO  [] Manual (23672) x      [] Other:  [] TA x      [] Mech Traction (39939)  [] ES(attended) (24400)      [] ES (un) (80846):       GOALS:   Patient stated goal: pain decrease after activity. Cleaning and volleyball. []? Progressing: []? Met: []? Not Met: []? Adjusted    Therapist goals for Patient:   Short Term Goals: To be achieved in: 2 weeks  1. Independent in HEP and progression per patient tolerance, in order to prevent re-injury. []? Progressing: []? Met: []? Not Met: []? Adjusted   2. Patient will have a decrease in pain to facilitate improvement in movement, function, and ADLs as indicated by Functional Deficits. []? Progressing: []? Met: []? Not Met: []? Adjusted     Long Term Goals: To be achieved in: 8 weeks  1. Disability index score of  80 or higher to assist with reaching prior level of function. []? Progressing: []? Met: []? Not Met: []? Adjusted  2. Patient will demonstrate increased AROM to WNL to allow for proper joint functioning as indicated by patients Functional Deficits. []? Progressing: []? Met: []? Not Met: []? Adjusted  3. Patient will demonstrate an increase in strength to good scapular and core control  for UE to allow for proper functional mobility as indicated by patients Functional Deficits. []? Progressing: []? Met: []? Not Met: []? Adjusted  4. Patient will return to household functional activities without increased symptoms or restriction. []? Progressing: []? Met: []? Not Met: []? Adjusted  5. Pt will have less pain after playing volleyball. []? Progressing: []? Met: []? Not Met: []? Adjusted    Overall Progression Towards Functional goals/ Treatment Progress Update:  [] Patient is progressing as expected towards functional goals listed. [] Progression is slowed due to complexities/Impairments listed. [] Progression has been slowed due to co-morbidities.   [x] Plan just implemented, too soon to assess goals progression <30days   [] Goals require adjustment due to lack of progress  [] Patient is not progressing as expected and requires additional follow up with physician  [] Other    Prognosis for POC: [x] Good [] Fair  [] Poor      Patient requires continued skilled intervention: [x] Yes  [] No    Treatment/Activity Tolerance:  [x] Patient able to complete treatment  [x] Patient limited by fatigue  [] Patient limited by pain     [] Patient limited by other medical complications  [x] Other: Patient has fair scapular control during exercises. She also has a lumbar lordosis. She tolerated the weights well, but fatigues easily. PLAN: Strength and stabilization exercises. [x] Continue per plan of care [] Alter current plan (see comments above)  [] Plan of care initiated [] Hold pending MD visit [] Discharge      Electronically signed by:  Laurel Madrigal PT    Note: If patient does not return for scheduled/ recommended follow up visits, this note will serve as a discharge from care along with most recent update on progress.

## 2022-05-24 ENCOUNTER — HOSPITAL ENCOUNTER (OUTPATIENT)
Dept: PHYSICAL THERAPY | Age: 37
Setting detail: THERAPIES SERIES
Discharge: HOME | End: 2022-05-24
Payer: COMMERCIAL

## 2022-05-24 PROCEDURE — 97110 THERAPEUTIC EXERCISES: CPT | Performed by: PHYSICAL THERAPIST

## 2022-05-24 PROCEDURE — 97112 NEUROMUSCULAR REEDUCATION: CPT | Performed by: PHYSICAL THERAPIST

## 2022-05-24 NOTE — FLOWSHEET NOTE
The [address] [place] [address],[facility] [address]  Phone: [phone]  Fax [phone]    Physical Therapy Daily Treatment Note  Date:  2022    Patient Name:  Ab Simms    :  1985  MRN: [id number]  Restrictions/Precautions:    Medical/Treatment Diagnosis Information:  · Diagnosis: V16.380E (ICD-10-CM) - Superior glenoid labrum lesion of right shoulder, initial encounter; M25.511 (ICD-10-CM) - Right shoulder pain, unspecified chronicity; M75.21 (ICD-10-CM) - Biceps tendinitis of right upper extremity  · Treatment Diagnosis: increased pain M25.511; decreased strength; poor posture  Insurance/Certification information:  PT Insurance Information: [facility]  Physician Information:   Mary Kate Mcginnis MD  Has the plan of care been signed (Y/N):        []  Yes  [x]  No     Date of Patient follow up with Physician:  22      Is this a Progress Report:     []  Yes  [x]  No        If Yes:  Date Range for reporting period:  Beginning 5/3/22  Ending     Progress report will be due (10 Rx or 30 days whichever is less): 7/3/29      Recertification will be due (POC Duration  / 90 days whichever is less):  8/3/22        Visit # Insurance Allowable Auth Required   3  []  Yes []  No        Functional Scale: FOTO= 70   Date assessed:  5/3/22    Latex Allergy:  [x]NO      []YES  Preferred Language for Healthcare:   [x]English       []other:    Pain level:      SUBJECTIVE:  Pt is feeling stronger. +HEP. Pt has many questions regarding the surgery decision making process. Also, trial of volleyball to determine if pain has changed. Pt does have pain after cleaning her home, circular motion. OBJECTIVE:    Observation: pain with [place] volleyball serve into CBA.     Test measurements:   Measure at next visit    RESTRICTIONS/PRECAUTIONS:  Labral tear    Exercises/Interventions: Right shoulder    Exercise/Equipment Resistance/Repetitions Other comments   Stretching/PROM     Wand     Table Slides     UE [place]     Pulleys     Pendulum          Isometrics     Retraction          Weight shift     Flexion     Abduction     External Rotation     Internal Rotation     Biceps     Triceps          PRE's     Flexion     Abduction     External Rotation 3# 3x10    Internal Rotation     Shrugs     EXT 3/3/5# 3x10 With scapular pinch   Reverse Flys     Serratus 3/3/5# 3x10    Horizontal Abd with ER Bilateral 3x10 ER/ABD/ER; change each set   Biceps     Triceps     Retraction Prone row 3# 3x10         Cable Column/Theraband     External Rotation 90/90  Blue 1x10    Internal Rotation 1. Black band 3x10 2. 90/90 Blue 1x10   Shrugs     Lats     Ext     Flex     Scapular Retraction     BIC     TRIC     PNF     Supine HABD Green/Blue/Black  3x10     Dynamic Stability     Serratus at wall  Standing plank on wall Foam roll; blue band/wrist  With reaches x5 each side 3x10  Blue band   Plyoback          Manual interventions     Rhym stabilization Neutral scaption [id number] Eyes closed          Patient Education:  Access Code: [email or web address]  URL: [email or web address]  Date: 05/03/2022  Prepared by: Kelvin Campos    Exercises  Sidelying Shoulder External Rotation - 1 x daily - 7 x weekly - 3 sets - 10 reps  Supine Scapular Protraction in Flexion with Dumbbells - 1 x daily - 7 x weekly - 3 sets - 10 reps  Prone Shoulder Extension - Single Arm - 1 x daily - 7 x weekly - 3 sets - 10 reps  Prone Shoulder Row - 1 x daily - 7 x weekly - 3 sets - 10 reps  Prone Shoulder Horizontal Abduction with Thumbs Up - 1 x daily - 7 x weekly - 3 sets - 10 reps  Shoulder Internal Rotation with Resistance - 1 x daily - 7 x weekly - 3 sets - 10 reps  Serratus Activation at Wall with Foam Roll and Resistance Band - 1 x daily - 7 x weekly - 3 sets - 10 reps      Therapeutic Exercise and NMR EXR  [x] (42422) Provided verbal/tactile cueing for activities related to strengthening, flexibility, endurance, ROM  for improvements in scapular, scapulothoracic and UE control with self care, reaching, carrying, lifting, house/yardwork, driving/computer work. [x] (23989) Provided verbal/tactile cueing for activities related to improving balance, coordination, kinesthetic sense, posture, motor skill, proprioception  to assist with  scapular, scapulothoracic and UE control with self care, reaching, carrying, lifting, house/yardwork, driving/computer work. Therapeutic Activities:    [] (17864 or 51956) Provided verbal/tactile cueing for activities related to improving balance, coordination, kinesthetic sense, posture, motor skill, proprioception and motor activation to allow for proper function of scapular, scapulothoracic and UE control with self care, carrying, lifting, driving/computer work.      Home Exercise Program:    [x] (90649) Reviewed/Progressed HEP activities related to strengthening, flexibility, endurance, ROM of scapular, scapulothoracic and UE control with self care, reaching, carrying, lifting, house/yardwork, driving/computer work  [x] (86522) Reviewed/Progressed HEP activities related to improving balance, coordination, kinesthetic sense, posture, motor skill, proprioception of scapular, scapulothoracic and UE control with self care, reaching, carrying, lifting, house/yardwork, driving/computer work      Manual Treatments:  PROM / STM / Oscillations-Mobs:  G-I, II, III, IV (PA's, Inf., Post.)  [] (39782) Provided manual therapy to mobilize soft tissue/joints of cervical/CT, scapular GHJ and UE for the purpose of modulating pain, promoting relaxation,  increasing ROM, reducing/eliminating soft tissue swelling/inflammation/restriction, improving soft tissue extensibility and allowing for proper ROM for normal function with self care, reaching, carrying, lifting, house/yardwork, driving/computer work    Modalities:      Charges:  Timed Code Treatment Minutes: 45   Total Treatment Minutes: 45 [] EVAL (LOW) 31264 (typically 20 minutes face-to-face)  [] EVAL (MOD) 39155 (typically 30 minutes face-to-face)  [] EVAL (HIGH) 93267 (typically 45 minutes face-to-face)  [] RE-EVAL     [x] AB(19829) x  2   [] IONTO  [x] NMR (73589) x  1   [] VASO  [] Manual (04906) x      [] Other:  [] TA x      [] Mech Traction (23693)  [] ES(attended) (08686)      [] ES (un) (57449):       GOALS:   Patient stated goal: pain decrease after activity. Cleaning and volleyball. []? Progressing: []? Met: []? Not Met: []? Adjusted    Therapist goals for Patient:   Short Term Goals: To be achieved in: 2 weeks  1. Independent in HEP and progression per patient tolerance, in order to prevent re-injury. []? Progressing: []? Met: []? Not Met: []? Adjusted   2. Patient will have a decrease in pain to facilitate improvement in movement, function, and ADLs as indicated by Functional Deficits. []? Progressing: []? Met: []? Not Met: []? Adjusted     Long Term Goals: To be achieved in: 8 weeks  1. Disability index score of  80 or higher to assist with reaching prior level of function. []? Progressing: []? Met: []? Not Met: []? Adjusted  2. Patient will demonstrate increased AROM to WNL to allow for proper joint functioning as indicated by patients Functional Deficits. []? Progressing: []? Met: []? Not Met: []? Adjusted  3. Patient will demonstrate an increase in strength to good scapular and core control  for UE to allow for proper functional mobility as indicated by patients Functional Deficits. []? Progressing: []? Met: []? Not Met: []? Adjusted  4. Patient will return to household functional activities without increased symptoms or restriction. []? Progressing: []? Met: []? Not Met: []? Adjusted  5. Pt will have less pain after playing volleyball. []? Progressing: []? Met: []? Not Met: []?  Adjusted    Overall Progression Towards Functional goals/ Treatment Progress Update:  [] Patient is progressing as expected towards functional goals listed. [] Progression is slowed due to complexities/Impairments listed. [] Progression has been slowed due to co-morbidities. [x] Plan just implemented, too soon to assess goals progression <30days   [] Goals require adjustment due to lack of progress  [] Patient is not progressing as expected and requires additional follow up with physician  [] Other    Prognosis for POC: [x] Good [] Fair  [] Poor      Patient requires continued skilled intervention: [x] Yes  [] No    Treatment/Activity Tolerance:  [x] Patient able to complete treatment  [x] Patient limited by fatigue  [x] Patient limited by pain     [] Patient limited by other medical complications  [x] Other: Pt education on surgical decision making process. She sees the MD this week. Reviewed importance of scapular timing; UT compensation patterns; setting the scapular muscles. Trial of 90/90 position to assess stability. Mild advances on the HEP. Pt has pain after cleaning the house and after playing volleyball(prior to injection). She has a good understanding of the program and goals. PLAN: Strength and stabilization exercises. [x] Continue per plan of care [] Alter current plan (see comments above)  [] Plan of care initiated [] Hold pending MD visit [] Discharge      Electronically signed by:  Anjana Hill PT    Note: If patient does not return for scheduled/ recommended follow up visits, this note will serve as a discharge from care along with most recent update on progress.

## 2022-05-26 ENCOUNTER — OFFICE VISIT (OUTPATIENT)
Dept: ORTHOPEDIC SURGERY | Age: 37
End: 2022-05-26
Payer: COMMERCIAL

## 2022-05-26 VITALS — WEIGHT: 165 LBS | HEIGHT: 67 IN | BODY MASS INDEX: 25.9 KG/M2

## 2022-05-26 DIAGNOSIS — S43.431A SUPERIOR GLENOID LABRUM LESION OF RIGHT SHOULDER, INITIAL ENCOUNTER: Primary | ICD-10-CM

## 2022-05-26 PROCEDURE — 1036F TOBACCO NON-USER: CPT | Performed by: ORTHOPAEDIC SURGERY

## 2022-05-26 PROCEDURE — 99213 OFFICE O/P EST LOW 20 MIN: CPT | Performed by: ORTHOPAEDIC SURGERY

## 2022-05-26 PROCEDURE — G8427 DOCREV CUR MEDS BY ELIG CLIN: HCPCS | Performed by: ORTHOPAEDIC SURGERY

## 2022-05-26 PROCEDURE — G8419 CALC BMI OUT NRM PARAM NOF/U: HCPCS | Performed by: ORTHOPAEDIC SURGERY

## 2022-05-26 RX ORDER — SEMAGLUTIDE 1.34 MG/ML
INJECTION, SOLUTION SUBCUTANEOUS
COMMUNITY
Start: 2022-05-06

## 2022-05-26 NOTE — PROGRESS NOTES
Chief Complaint    Follow-up (Right Shoulder)      History of Present Illness: Radha Velazquez is a pleasant, 31 y.o., female, here today for follow up of her right shoulder. This patient was found to have a SLAP lesion, as demonstrated on MRI. We did initiate a trial of conservative treatment for this problem inclusive of formal physical therapy at the [facility], she has been taking meloxicam and we did go ahead and administer a steroid injection for this problem. Overall she feels she is doing okay, she has not yet gone back to volleyball and has not really tested her shoulder yet. Most of her pain is posteriorly located. She does take spinning classes multiple times per week. She feels her poor posture may be a contributing factor to her pain. She reports no new injuries or setbacks. Pain Assessment  Location of Pain: Shoulder  Location Modifiers: Right  Severity of Pain: 1  Quality of Pain: Sharp,Aching  Duration of Pain: Persistent  Frequency of Pain: Intermittent  Aggravating Factors:  (volleyball activities, repetitive movements)  Limiting Behavior: Some  Relieving Factors: Rest,Ice  Work-Related Injury: No  Are there other pain locations you wish to document?: No      Medical History:  Patient's medications, allergies, past medical, surgical, social and family histories were reviewed and updated as appropriate. No notes on file    Review of Systems  A 14 point review of systems was completed by the patient on 4/11/22 and is available in the media section of the scanned medical record and was reviewed on 5/26/2022. The review is negative with the exception of those things mentioned in the HPI and Past Medical History    Vital Signs:  Vitals:       General/Appearance: Alert and oriented and in no apparent distress. Skin:  There are no skin lesions, cellulitis, or extreme edema. The patient has warm and well-perfused Bilateral upper extremities with brisk capillary refill.       Right Shoulder Exam:  Inspection: No gross deformities, no signs of infection. Palpation: no crepitus    Active Range of Motion: Forward Elevation 170, Abduction 170, External Rotation 50, Internal Rotation T8    Passive Range of Motion: similar to active    Strength:  External Rotation 5-/5, Internal Rotation 5/5, Champagne Toast 4+/5 with pain, Supraspinatus 4+/5 with pain    Special Tests: No Cade muscle deformity. Neurovascular: Sensation to light touch is intact, no motor deficits, palpable radial pulses 2+      Radiology:     No new XR obtained at this time. Assessment : Loraine Manzano is a 31 y.o. female a left shoulder pain related to a SLAP lesion who is doing well in conservative treatment. Impression:  Encounter Diagnosis   Name Primary?  Superior glenoid labrum lesion of right shoulder, initial encounter Yes       Office Procedures:  Orders Placed This Encounter   Procedures   [address] [facility]     Referral Priority:   Routine     Referral Type:   Eval and Treat     Referral Reason:   Specialty Services Required     Requested Specialty:   Physical Therapist     Number of Visits Requested:   1       Treatment Plan: At this point we feel she is ready to test her shoulder in terms of volleyball. She may ease into this activity. We do feel she will continue to benefit from physical therapy. A new physical therapy letter was documented in EPIC today. She should focus on maintaining good posture as well. Her therapist can work with her on this as well. We will see Juani Collins back in 6 weeks, however if she is doing well she may cancel this appointment and/or as needed. All questions were answered to patient's satisfaction and She was encouraged to call with any further questions or concerns. Dax Ruffin is in agreement with this plan.     5/26/2022  1:10 PM    Aldo Ayala ATC  [facility]    During this examination, I, Bonniecorieregi Burk, functioned as a scribe for Dr. Lisa Shirley. The history taking and physical examination were performed by Dr. Nola Mast. All counseling during the appointment was performed between the patient and Dr. Nola Mast. 5/26/22  ______________  I, Dr. Lisa Shirley, personally performed the services described in this documentation as described by Rickey Burger ATC in my presence, and it is both accurate and complete. Ivette Mast MD, PhD  5/26/2022

## 2022-05-26 NOTE — LETTER
Physical Therapy Rehabilitation Referral    Patient Name:  Maribel Rodriguez      YOB: 1985    Diagnosis:    1. Superior glenoid labrum lesion of right shoulder, initial encounter        Precautions:     [x] Evaluate and Treat    Post Op Instructions:  [] Continuous passive motion (CPM)  [] Elbow ROM  [x] Exercise in plane of scapula  []  Strengthening     [x] Pulley and instruction   [x] Home exercise program (copy to patient)   [] Sling when arm at risk  [] Sling or brace at all times   [] AAROM: Forward elevation to  140            [] AAROM: External rotation  To  40    [] Isometric external rotator strengthening [] AAROM: internal rotation: up the back  [x] Isometric abductor strengthening  [] AAROM: Internal abduction   [] Isometric internal rotator strengthening [] AAROM: cross-body adduction             Stretching:     Strengthening:  [x] Four quadrant (FE, ER, IR, CBA)  [x] Rotator cuff (ER, IR, Abd)  [] Forward Elevation    [] External Rotators     [] External Rotation    [] Internal Rotators  [] Internal Rotation: up/back   [] Abductors     [] Internal Rotation: supine in abduction  [] Sleeper Stretch    [] Flexors  [] Cross-body abduction    [] Extensors  [] Pendulum (FE, Abd/Add, cw/ccw)  [x] Scapular Stabilizers   [] Wall-walking (FE, Abd)        [x] Shoulder shrugs     [] Table slides (FE)                [x] Rhomboid pinch  [] Elbow (flex, ext, pron, sup)        [x] Lat.  Pull downs     [] Medial epicondylitis program       [x] Forward punch   [] Lateral epicondylitis program       [x] Internal rotators     [x] Progressive resistive exercises  [] Bench Press        [] Bench press plus  Activities:     [] Lateral pull-downs  [x] Rowing     [] Progressive two-hand supine press  [] Stepper/Exercise bike   [x] Biceps: curls/supination  [] Swimming  [] Water exercises    Modalities:     Return to Sport:  [x] Of Choice      [x] Plyometrics  [] Ultrasound     [x] Rhythmic stabilization  [] Iontophoresis    [] Core strengthening   [] Moist heat     [x] Sports specific program: Volleyball  [] Massage         [x] Cryotherapy      [] Electrical stimulation     [] Paraffin  [] Whirlpool  [] TENS    [x] Home exercise program (copy to patient). Perform exercises for:   15     minutes    3      times/day  [x] Supervised physical therapy  Frequency: []  1x week  [x] 2x week  [] 3x week  [] Other:   Duration: [] 2 weeks   [] 4 weeks  [x] 6 weeks  [] Other:     Additional Instructions:     Ivette Concepcion MD, PhD

## 2022-05-31 ENCOUNTER — HOSPITAL ENCOUNTER (OUTPATIENT)
Dept: PHYSICAL THERAPY | Age: 37
Setting detail: THERAPIES SERIES
Discharge: HOME | End: 2022-05-31
Payer: COMMERCIAL

## 2022-05-31 PROCEDURE — 97110 THERAPEUTIC EXERCISES: CPT | Performed by: PHYSICAL THERAPIST

## 2022-05-31 PROCEDURE — 97112 NEUROMUSCULAR REEDUCATION: CPT | Performed by: PHYSICAL THERAPIST

## 2022-05-31 NOTE — FLOWSHEET NOTE
The [address] [place] [address],[address], [address]  Phone: [phone]  Fax [phone]    Physical Therapy Daily Treatment Note  Date:  2022    Patient Name:  Buffy Wesley    :  1985  MRN: [id number]  Restrictions/Precautions:    Medical/Treatment Diagnosis Information:  · Diagnosis: S43.431A (ICD-10-CM) - Superior glenoid labrum lesion of right shoulder, initial encounter; M25.511 (ICD-10-CM) - Right shoulder pain, unspecified chronicity; M75.21 (ICD-10-CM) - Biceps tendinitis of right upper extremity  · Treatment Diagnosis: increased pain M25.511; decreased strength; poor posture  Insurance/Certification information:  PT Insurance Information: [facility] $40  Physician Information:   Asha Alejandro MD  Has the plan of care been signed (Y/N):        [x]  Yes  []  No     Date of Patient follow up with Physician:  22      Is this a Progress Report:     [x]  Yes  []  No        If Yes:  Date Range for reporting period:  Beginning 5/3/22  Ending 22    Progress report will be due (10 Rx or 30 days whichever is less): 75      Recertification will be due (POC Duration  / 90 days whichever is less):  8/3/22        Visit # Insurance Allowable Auth Required   4 90 visits, no auth []  Yes []  No        Functional Scale: FOTO= 70   Date assessed:  5/3/22  Give FOTO NV    Latex Allergy:  [x]NO      []YES  Preferred Language for Healthcare:   [x]English       []other:    Pain level:      SUBJECTIVE:  Pt reports she had a good follow up with Dr Santosh Mckeon. He wants her to continue PT and also try to play some volleyball before her next follow up to help determine if she will need surgery or not. OBJECTIVE:    Observation: pain with [place] volleyball serve into CBA.     Test measurements:   MMT: shoulder flex 4+ R, 5/5 L, abd 5/5 B, ER 4 R, 4+ L, IR 4+ R/5- L, tirceps 4+ B    RESTRICTIONS/PRECAUTIONS:  Labral tear 3-6 mm posterior and superior    Exercises/Interventions: Right shoulder    Exercise/Equipment Resistance/Repetitions Other comments   Stretching/PROM     Wand     Table Slides     UE [place]     Pulleys     Pendulum          Isometrics     Retraction          Weight shift     Flexion     Abduction     External Rotation     Internal Rotation     Biceps     Triceps          PRE's     Flexion     Abduction     External Rotation  No $ w/ LBW   RTB UE's/Blue loop LE's 2x20' R/L   Internal Rotation     Shrugs     EXT  With scapular pinch   Reverse Flys     Serratus     Horizontal Abd with ER  ER/ABD/ER; change each set   Biceps     Triceps     Retraction          Cable Column/Theraband     External Rotation    Internal Rotation 2. 90/90 Blue 1x10   Shrugs     Lats     Ext     Flex     Scapular Retraction     BIC     TRIC     PNF     Supine HABD  Supine PNF D2 RTB  3x10   RTB 3x10 R    CC low trap tug 30# x6--pinching, stopped         Dynamic Stability     Serratus at wall  Standing plank on wall Foam roll; blue band/wrist  With reaches x5 each side 3x10  Blue band   Quadruped bird dog 5\" x10 R/L    Body blade 30 deg abd 3x20\" R     hold 90 deg flex 3# 3x30\" R         Plyoback          Manual interventions     Rhym stabilization  Eyes closed   Trial KT NV       Patient Education:  Access Code: [email or web address]  URL: [email or web address]  Date: 05/03/2022  Prepared by: Minal Marielle    Exercises  Sidelying Shoulder External Rotation - 1 x daily - 7 x weekly - 3 sets - 10 reps  Supine Scapular Protraction in Flexion with Dumbbells - 1 x daily - 7 x weekly - 3 sets - 10 reps  Prone Shoulder Extension - Single Arm - 1 x daily - 7 x weekly - 3 sets - 10 reps  Prone Shoulder Row - 1 x daily - 7 x weekly - 3 sets - 10 reps  Prone Shoulder Horizontal Abduction with Thumbs Up - 1 x daily - 7 x weekly - 3 sets - 10 reps  Shoulder Internal Rotation with Resistance - 1 x daily - 7 x weekly - 3 sets - 10 reps  Serratus Activation at [facility] with Foam Roll and Resistance Band - 1 x daily - 7 x weekly - 3 sets - 10 reps      Therapeutic Exercise and NMR EXR  [x] (90643) Provided verbal/tactile cueing for activities related to strengthening, flexibility, endurance, ROM  for improvements in scapular, scapulothoracic and UE control with self care, reaching, carrying, lifting, house/yardwork, driving/computer work. [x] (34417) Provided verbal/tactile cueing for activities related to improving balance, coordination, kinesthetic sense, posture, motor skill, proprioception  to assist with  scapular, scapulothoracic and UE control with self care, reaching, carrying, lifting, house/yardwork, driving/computer work. Therapeutic Activities:    [] (23901 or 51261) Provided verbal/tactile cueing for activities related to improving balance, coordination, kinesthetic sense, posture, motor skill, proprioception and motor activation to allow for proper function of scapular, scapulothoracic and UE control with self care, carrying, lifting, driving/computer work.      Home Exercise Program:    [x] (78625) Reviewed/Progressed HEP activities related to strengthening, flexibility, endurance, ROM of scapular, scapulothoracic and UE control with self care, reaching, carrying, lifting, house/yardwork, driving/computer work  [x] (33569) Reviewed/Progressed HEP activities related to improving balance, coordination, kinesthetic sense, posture, motor skill, proprioception of scapular, scapulothoracic and UE control with self care, reaching, carrying, lifting, house/yardwork, driving/computer work      Manual Treatments:  PROM / STM / Oscillations-Mobs:  G-I, II, III, IV (PA's, Inf., Post.)  [] (05614) Provided manual therapy to mobilize soft tissue/joints of cervical/CT, scapular GHJ and UE for the purpose of modulating pain, promoting relaxation,  increasing ROM, reducing/eliminating soft tissue swelling/inflammation/restriction, improving soft tissue extensibility and allowing

## 2022-06-07 ENCOUNTER — APPOINTMENT (OUTPATIENT)
Dept: PHYSICAL THERAPY | Age: 37
End: 2022-06-07
Payer: COMMERCIAL

## 2022-06-14 ENCOUNTER — HOSPITAL ENCOUNTER (OUTPATIENT)
Dept: PHYSICAL THERAPY | Age: 37
Setting detail: THERAPIES SERIES
Discharge: HOME | End: 2022-06-14
Payer: COMMERCIAL

## 2022-06-14 PROCEDURE — 97110 THERAPEUTIC EXERCISES: CPT | Performed by: PHYSICAL THERAPIST

## 2022-06-14 PROCEDURE — 97112 NEUROMUSCULAR REEDUCATION: CPT | Performed by: PHYSICAL THERAPIST

## 2022-06-15 NOTE — FLOWSHEET NOTE
The [address] [place] [address],[facility] [address], [address]  Phone: [phone]  Fax [phone]    Physical Therapy Daily Treatment Note  Date:  2022    Patient Name:  Pat Robles    :  1985  MRN: [id number]  Restrictions/Precautions:    Medical/Treatment Diagnosis Information:  · Diagnosis: S43.431A (ICD-10-CM) - Superior glenoid labrum lesion of right shoulder, initial encounter; M25.511 (ICD-10-CM) - Right shoulder pain, unspecified chronicity; M75.21 (ICD-10-CM) - Biceps tendinitis of right upper extremity  · Treatment Diagnosis: increased pain M25.511; decreased strength; poor posture  Insurance/Certification information:  PT Insurance Information: [facility] $40  Physician Information:   Lisa Shirley MD  Has the plan of care been signed (Y/N):        [x]  Yes  []  No     Date of Patient follow up with Physician:  22      Is this a Progress Report:     [x]  Yes  []  No        If Yes:  Date Range for reporting period:  Beginning 5/3/22  Ending 22    Progress report will be due (10 Rx or 30 days whichever is less):       Recertification will be due (POC Duration  / 90 days whichever is less):  8/3/22        Visit # Insurance Allowable Auth Required   5 90 visits, no auth []  Yes []  No        Functional Scale: FOTO= 70   Date assessed:  5/3/22  Give FOTO NV    Latex Allergy:  [x]NO      []YES  Preferred Language for Healthcare:   [x]English       []other:    Pain level:      SUBJECTIVE:  Pt is doing well with the exercises. OBJECTIVE:    Observation: pain with [place] volleyball serve into CBA.     Test measurements:   MMT: shoulder flex 4+ R, 5/5 L, abd 5/5 B, ER 4 R, 4+ L, IR 4+ R/5- L, tirceps 4+ B    RESTRICTIONS/PRECAUTIONS:  Labral tear 3-6 mm posterior and superior    Exercises/Interventions: Right shoulder    Exercise/Equipment Resistance/Repetitions Other comments   Stretching/PROM     Wand     Table Slides     UE [place]     Pulleys     Pendulum          Isometrics     Retraction          Weight shift     Flexion     Abduction     External Rotation     Internal Rotation     Biceps     Triceps          PRE's     Flexion     Abduction     External Rotation  No $ w/ LBW 5/5/3# 3x10     Internal Rotation     Shrugs     EXT 0# 3x10 Bilateral With scapular pinch   Reverse Flys     Serratus 5# 3x10    Horizontal Abd with ER Bilateral 3x10 0/1/1# ER/ABD/ER; change each set   Biceps     Triceps     Retraction Prone row 5# 3x10    Lower trap 0# 3x10    Cable Column/Theraband     External Rotation 90/90  Blue 3x10   Internal Rotation 1. Machine  3x102. 90/90 Blue 3x10   Shrugs     Lats     Ext     Flex     Scapular Retraction     BIC     TRIC     PNF     Supine HABD  Supine PNF D2 CC low trap tug  Dynamic Stability Serratus at wall  Standing plank on wall Quadruped bird dog Body blade 30 deg abd  hold 90 deg flex      Plyoback          Manual interventions     Rhym   IASTM  Trigger point release 5 min  With theracane Eyes closed   Trial KT NV       Patient Education:  Access Code: [email or web address]  URL: [email or web address]  Date: 05/03/2022  Prepared by: Janki Ferro    Exercises  Sidelying Shoulder External Rotation - 1 x daily - 7 x weekly - 3 sets - 10 reps  Supine Scapular Protraction in Flexion with Dumbbells - 1 x daily - 7 x weekly - 3 sets - 10 reps  Prone Shoulder Extension - Single Arm - 1 x daily - 7 x weekly - 3 sets - 10 reps  Prone Shoulder Row - 1 x daily - 7 x weekly - 3 sets - 10 reps  Prone Shoulder Horizontal Abduction with Thumbs Up - 1 x daily - 7 x weekly - 3 sets - 10 reps  Shoulder Internal Rotation with Resistance - 1 x daily - 7 x weekly - 3 sets - 10 reps  Serratus Activation at Wall with Foam Roll and Resistance Band - 1 x daily - 7 x weekly - 3 sets - 10 reps      Therapeutic Exercise and NMR EXR  [x] (89198) Provided verbal/tactile cueing for activities related to strengthening, flexibility, endurance, ROM  for improvements in scapular, scapulothoracic and UE control with self care, reaching, carrying, lifting, house/yardwork, driving/computer work. [x] (11043) Provided verbal/tactile cueing for activities related to improving balance, coordination, kinesthetic sense, posture, motor skill, proprioception  to assist with  scapular, scapulothoracic and UE control with self care, reaching, carrying, lifting, house/yardwork, driving/computer work. Therapeutic Activities:    [] (38566 or 09897) Provided verbal/tactile cueing for activities related to improving balance, coordination, kinesthetic sense, posture, motor skill, proprioception and motor activation to allow for proper function of scapular, scapulothoracic and UE control with self care, carrying, lifting, driving/computer work.      Home Exercise Program:    [x] (01645) Reviewed/Progressed HEP activities related to strengthening, flexibility, endurance, ROM of scapular, scapulothoracic and UE control with self care, reaching, carrying, lifting, house/yardwork, driving/computer work  [x] (99226) Reviewed/Progressed HEP activities related to improving balance, coordination, kinesthetic sense, posture, motor skill, proprioception of scapular, scapulothoracic and UE control with self care, reaching, carrying, lifting, house/yardwork, driving/computer work      Manual Treatments:  PROM / STM / Oscillations-Mobs:  G-I, II, III, IV (PA's, Inf., Post.)  [] (38754) Provided manual therapy to mobilize soft tissue/joints of cervical/CT, scapular GHJ and UE for the purpose of modulating pain, promoting relaxation,  increasing ROM, reducing/eliminating soft tissue swelling/inflammation/restriction, improving soft tissue extensibility and allowing for proper ROM for normal function with self care, reaching, carrying, lifting, house/yardwork, driving/computer work    Modalities:      Charges:  Timed Code Treatment Minutes: 45   Total Treatment Minutes: 45       [] EVAL (LOW) 97079 (typically 20 minutes face-to-face)  [] EVAL (MOD) 72636 (typically 30 minutes face-to-face)  [] EVAL (HIGH) 44210 (typically 45 minutes face-to-face)  [] RE-EVAL     [x] EB(77212) x  2   [] IONTO  [x] NMR (80350) x  1   [] VASO  [] Manual (00027) x      [] Other:  [] TA x      [] Mech Traction (48292)  [] ES(attended) (26199)      [] ES (un) (25055):       GOALS:   Patient stated goal: pain decrease after activity. Cleaning and volleyball. []? Progressing: []? Met: []? Not Met: []? Adjusted    Therapist goals for Patient:   Short Term Goals: To be achieved in: 2 weeks  1. Independent in HEP and progression per patient tolerance, in order to prevent re-injury. []? Progressing: [x]? Met: []? Not Met: []? Adjusted   2. Patient will have a decrease in pain to facilitate improvement in movement, function, and ADLs as indicated by Functional Deficits. []? Progressing: [x]? Met: []? Not Met: []? Adjusted     Long Term Goals: To be achieved in: 8 weeks  1. Disability index score of  80 or higher to assist with reaching prior level of function. []? Progressing: []? Met: []? Not Met: []? Adjusted  2. Patient will demonstrate increased AROM to WNL to allow for proper joint functioning as indicated by patients Functional Deficits. [x]? Progressing: []? Met: []? Not Met: []? Adjusted  3. Patient will demonstrate an increase in strength to good scapular and core control  for UE to allow for proper functional mobility as indicated by patients Functional Deficits. [x]? Progressing: []? Met: []? Not Met: []? Adjusted  4. Patient will return to household functional activities without increased symptoms or restriction. [x]? Progressing: []? Met: []? Not Met: []? Adjusted  5. Pt will have less pain after playing volleyball. []? Progressing: []? Met: [x]? Not Met: []?  Adjusted    Overall Progression Towards Functional goals/ Treatment Progress Update:  [x] Patient is progressing as expected towards functional goals listed. [] Progression is slowed due to complexities/Impairments listed. [] Progression has been slowed due to co-morbidities. [] Plan just implemented, too soon to assess goals progression <30days   [] Goals require adjustment due to lack of progress  [] Patient is not progressing as expected and requires additional follow up with physician  [] Other    Prognosis for POC: [x] Good [] Fair  [] Poor      Patient requires continued skilled intervention: [x] Yes  [] No    Treatment/Activity Tolerance:  [x] Patient able to complete treatment  [x] Patient limited by fatigue  [] Patient limited by pain     [] Patient limited by other medical complications  [x] Other: Good tolerance for the exercises. VCs and TCs for technique. RTC and scapular muscle weakness noted. PLAN: Strength and stabilization exercises. [x] Continue per plan of care [] Alter current plan (see comments above)  [] Plan of care initiated [] Hold pending MD visit [] Discharge      Electronically signed by:  Tate Pérez PT    Note: If patient does not return for scheduled/ recommended follow up visits, this note will serve as a discharge from care along with most recent update on progress.

## 2022-06-21 ENCOUNTER — APPOINTMENT (OUTPATIENT)
Dept: PHYSICAL THERAPY | Age: 37
End: 2022-06-21
Payer: COMMERCIAL

## 2022-06-22 ENCOUNTER — HOSPITAL ENCOUNTER (OUTPATIENT)
Dept: PHYSICAL THERAPY | Age: 37
Setting detail: THERAPIES SERIES
Discharge: HOME | End: 2022-06-22
Payer: COMMERCIAL

## 2022-06-22 PROCEDURE — 97140 MANUAL THERAPY 1/> REGIONS: CPT

## 2022-06-22 PROCEDURE — 97112 NEUROMUSCULAR REEDUCATION: CPT

## 2022-06-22 NOTE — FLOWSHEET NOTE
The [address] [place] [address],[address], [address], [place] [address], [address]  Phone: [phone]  Fax [phone]    Physical Therapy Daily Treatment Note  Date:  2022    Patient Name:  Talat Mishra    :  1985  MRN: [id number]  Restrictions/Precautions:    Medical/Treatment Diagnosis Information:  · Diagnosis: S43.431A (ICD-10-CM) - Superior glenoid labrum lesion of right shoulder, initial encounter; M25.511 (ICD-10-CM) - Right shoulder pain, unspecified chronicity; M75.21 (ICD-10-CM) - Biceps tendinitis of right upper extremity  · Treatment Diagnosis: increased pain M25.511; decreased strength; poor posture  Insurance/Certification information:  PT Insurance Information: [facility] $40  Physician Information:   Sigrid Jarvis MD  Has the plan of care been signed (Y/N):        [x]  Yes  []  No     Date of Patient follow up with Physician:  22      Is this a Progress Report:     []  Yes  [x]  No        If Yes:  Date Range for reporting period:  Beginning 5/3/22  Ending 22    Progress report will be due (10 Rx or 30 days whichever is less): 22      Recertification will be due (POC Duration  / 90 days whichever is less):  8/3/22        Visit # Insurance Allowable Auth Required   5 90 visits, no auth []  Yes []  No        Functional Scale: FOTO= 70   Date assessed:  5/3/22  Give FOTO NV    Latex Allergy:  [x]NO      []YES  Preferred Language for Healthcare:   [x]English       []other:    Pain level:  -5/10     SUBJECTIVE:  Pt had a lot of pain after last session after doing more overhead work, to the point she had difficulty brushing her hair and clasping her bra behind her. It has mostly recovered now but some rotational things are still a little sore. OBJECTIVE:    Observation: pain with [place] volleyball serve into CBA.     Test measurements:   MMT: shoulder flex 4+ R, 5/5 L, abd 5/5 B, ER 4 R, 4+ L, IR 4+ R/5- L, tirceps 4+ B    RESTRICTIONS/PRECAUTIONS:  Labral tear 3-6 mm posterior and superior    Exercises/Interventions: Right shoulder    Exercise/Equipment Resistance/Repetitions Other comments   Stretching/PROM     Wand     Table Slides     UE [place]     Pulleys     Pendulum          Isometrics     Retraction          Weight shift     Flexion     Abduction     External Rotation     Internal Rotation     Biceps     Triceps          PRE's     Flexion     Abduction     External Rotation  No $ w/ LBW      Internal Rotation     Shrugs     EXT  With scapular pinch   Reverse Flys     Serratus     Horizontal Abd with ER  ER/ABD/ER; change each set   Biceps     Triceps     Retraction     Prone scap retract, ext, T, Y, 90/90 ER 2x10ea Max tactile and verbal cueing to inc low trap and dec upper trap   Cable Column/Theraband     External Rotation    Internal Rotation 2. 90/90 Blue 1x10   Shrugs     Lats     Ext     Flex     Scapular Retraction     BIC     TRIC     PNF     Supine HABD  Supine PNF D2    CC low trap tug        Dynamic Stability    Serratus at wall  Standing plank on wall 3x10  Blue band   Quadruped bird dog    Body blade 30 deg abd     hold 90 deg flex         Plyoback          Manual interventions     STM, trigger point, cupping:  R upper trap 15 min    Rhym stabilization  Eyes closed   KT tape: scap retract,depress 5 min      Patient Education:  Access Code: [email or web address]  URL: [email or web address]  Date: 05/03/2022  Prepared by: Miah Rein    Exercises  Sidelying Shoulder External Rotation - 1 x daily - 7 x weekly - 3 sets - 10 reps  Supine Scapular Protraction in Flexion with Dumbbells - 1 x daily - 7 x weekly - 3 sets - 10 reps  Prone Shoulder Extension - Single Arm - 1 x daily - 7 x weekly - 3 sets - 10 reps  Prone Shoulder Row - 1 x daily - 7 x weekly - 3 sets - 10 reps  Prone Shoulder Horizontal Abduction with Thumbs Up - 1 x daily - 7 x weekly - 3 sets - 10 reps  Shoulder Internal Rotation with Resistance - 1 x daily - 7 x weekly - 3 sets - 10 reps  Serratus Activation at Wall with Foam Roll and Resistance Band - 1 x daily - 7 x weekly - 3 sets - 10 reps      Therapeutic Exercise and NMR EXR  [x] (53818) Provided verbal/tactile cueing for activities related to strengthening, flexibility, endurance, ROM  for improvements in scapular, scapulothoracic and UE control with self care, reaching, carrying, lifting, house/yardwork, driving/computer work.    [] (87113) Provided verbal/tactile cueing for activities related to improving balance, coordination, kinesthetic sense, posture, motor skill, proprioception  to assist with  scapular, scapulothoracic and UE control with self care, reaching, carrying, lifting, house/yardwork, driving/computer work. Therapeutic Activities:    [] (29361 or 03119) Provided verbal/tactile cueing for activities related to improving balance, coordination, kinesthetic sense, posture, motor skill, proprioception and motor activation to allow for proper function of scapular, scapulothoracic and UE control with self care, carrying, lifting, driving/computer work. Home Exercise Program:    [x] (78716) Reviewed/Progressed HEP activities related to strengthening, flexibility, endurance, ROM of scapular, scapulothoracic and UE control with self care, reaching, carrying, lifting, house/yardwork, driving/computer work  [x] (14569) Reviewed/Progressed HEP activities related to improving balance, coordination, kinesthetic sense, posture, motor skill, proprioception of scapular, scapulothoracic and UE control with self care, reaching, carrying, lifting, house/yardwork, driving/computer work      Access Code: [email or web address]  URL: [email or web address]. [email or web address]  Date: 06/22/2022  Prepared by: Demond Adamson    Exercises  Sidelying Shoulder ER with Towel and Dumbbell - 1 x daily - 7 x weekly - 3 sets - 10 reps  Prone Scapular Retraction - 1 x daily - 7 x weekly - 3 sets - 10 reps  Prone Scapular Slide with Shoulder Extension - 1 x daily - 7 x weekly - 3 sets - 10 reps  Prone Single Arm Shoulder Horizontal Abduction with Scapular Retraction and Palm Down - 1 x daily - 7 x weekly - 3 sets - 10 reps  Prone Scapular Retraction Y - 1 x daily - 7 x weekly - 3 sets - 10 reps  Prone Scapular Retraction with Shoulder External Rotation in Abduction - 1 x daily - 7 x weekly - 3 sets - 10 reps  Quadruped Alternating Arm Lift - 1 x daily - 7 x weekly - 3 sets - 10 reps  Push Up with Plus - 1 x daily - 7 x weekly - 3 sets - 10 reps  Shoulder External Rotation and Scapular Retraction with Resistance - 1 x daily - 7 x weekly - 3 sets - 10 reps    Manual Treatments:  PROM / STM / Oscillations-Mobs:  G-I, II, III, IV (PA's, Inf., Post.)  [x] (01906) Provided manual therapy to mobilize soft tissue/joints of cervical/CT, scapular GHJ and UE for the purpose of modulating pain, promoting relaxation,  increasing ROM, reducing/eliminating soft tissue swelling/inflammation/restriction, improving soft tissue extensibility and allowing for proper ROM for normal function with self care, reaching, carrying, lifting, house/yardwork, driving/computer work      Modalities:  Ice x 10 min    Charges:  Timed Code Treatment Minutes: 45   Total Treatment Minutes: 55       [] EVAL (LOW) 47033 (typically 20 minutes face-to-face)  [] EVAL (MOD) 00321 (typically 30 minutes face-to-face)  [] EVAL (HIGH) 80893 (typically 45 minutes face-to-face)  [] RE-EVAL     [] GERMAN(37096) x     [] IONTO  [x] NMR (77466) x  2   [] VASO  [x] Manual (38110) x 1     [] Other:  [] TA x      [] Mech Traction (90101)  [] ES(attended) (72026)      [] ES (un) (93180):       GOALS:   Patient stated goal: pain decrease after activity. Cleaning and volleyball. []? Progressing: []? Met: []? Not Met: []? Adjusted    Therapist goals for Patient:   Short Term Goals: To be achieved in: 2 weeks  1. Independent in HEP and progression per patient tolerance, in order to prevent re-injury. []? Progressing: [x]? Met: []? Not Met: []? Adjusted   2. Patient will have a decrease in pain to facilitate improvement in movement, function, and ADLs as indicated by Functional Deficits. []? Progressing: [x]? Met: []? Not Met: []? Adjusted     Long Term Goals: To be achieved in: 8 weeks  1. Disability index score of  80 or higher to assist with reaching prior level of function. []? Progressing: []? Met: []? Not Met: []? Adjusted  2. Patient will demonstrate increased AROM to WNL to allow for proper joint functioning as indicated by patients Functional Deficits. [x]? Progressing: []? Met: []? Not Met: []? Adjusted  3. Patient will demonstrate an increase in strength to good scapular and core control  for UE to allow for proper functional mobility as indicated by patients Functional Deficits. [x]? Progressing: []? Met: []? Not Met: []? Adjusted  4. Patient will return to household functional activities without increased symptoms or restriction. [x]? Progressing: []? Met: []? Not Met: []? Adjusted  5. Pt will have less pain after playing volleyball. []? Progressing: []? Met: [x]? Not Met: []? Adjusted    Overall Progression Towards Functional goals/ Treatment Progress Update:  [x] Patient is progressing as expected towards functional goals listed. [] Progression is slowed due to complexities/Impairments listed. [] Progression has been slowed due to co-morbidities.   [] Plan just implemented, too soon to assess goals progression <30days   [] Goals require adjustment due to lack of progress  [] Patient is not progressing as expected and requires additional follow up with physician  [] Other    Prognosis for POC: [x] Good [] Fair  [] Poor      Patient requires continued skilled intervention: [x] Yes  [] No    Treatment/Activity Tolerance:  [x] Patient able to complete treatment  [x] Patient limited by fatigue  [] Patient limited by pain     [] Patient limited by other medical complications  [x] Other: Pt continues to require max tactile and verbal cues for accurate low trap recruitment during scap retraction rather than over recruiting upper trap. She did not have any c/o shoulder pinching or pain though. Pt consistently struggles with having pain limiting overhead motions. She did feel that the KT tape assisting scap retraction though did somewhat help this. PLAN: Strength and stabilization exercises. [x] Continue per plan of care [] Alter current plan (see comments above)  [] Plan of care initiated [] Hold pending MD visit [] Discharge      Electronically signed by:  Ulises Benson PT    Note: If patient does not return for scheduled/ recommended follow up visits, this note will serve as a discharge from care along with most recent update on progress.

## 2022-06-28 ENCOUNTER — HOSPITAL ENCOUNTER (OUTPATIENT)
Dept: PHYSICAL THERAPY | Age: 37
Setting detail: THERAPIES SERIES
Discharge: HOME | End: 2022-06-28
Payer: COMMERCIAL

## 2022-06-28 PROCEDURE — 97140 MANUAL THERAPY 1/> REGIONS: CPT | Performed by: PHYSICAL THERAPIST

## 2022-06-28 PROCEDURE — 97112 NEUROMUSCULAR REEDUCATION: CPT | Performed by: PHYSICAL THERAPIST

## 2022-06-28 NOTE — PROGRESS NOTES
The [address], [address]  Phone: [phone]  Fax [phone]    Physical Therapy Daily Treatment Note  Date:  2022    Patient Name:  aNhum Mayo    :  1985  MRN: [id number]  Restrictions/Precautions:    Medical/Treatment Diagnosis Information:  · Diagnosis: S43.431A (ICD-10-CM) - Superior glenoid labrum lesion of right shoulder, initial encounter; M25.511 (ICD-10-CM) - Right shoulder pain, unspecified chronicity; M75.21 (ICD-10-CM) - Biceps tendinitis of right upper extremity  · Treatment Diagnosis: increased pain M25.511; decreased strength; poor posture  Insurance/Certification information:  PT Insurance Information: [facility] $40  Physician Information:   Navarro Nguyen MD  Has the plan of care been signed (Y/N):        [x]  Yes  []  No     Date of Patient follow up with Physician:  22      Is this a Progress Report:     []  Yes  [x]  No        If Yes:  Date Range for reporting period:  Beginning 5/3/22  Ending 22    Progress report will be due (10 Rx or 30 days whichever is less):       Recertification will be due (POC Duration  / 90 days whichever is less):  8/3/22        Visit # Insurance Allowable Auth Required   7 90 visits, no auth []  Yes []  No        Functional Scale: FOTO= 70   Date assessed:  5/3/22  FOTO=63/100     22    Latex Allergy:  [x]NO      []YES  Preferred Language for Healthcare:   [x]English       []other:    Pain level:  1-5/10     SUBJECTIVE:  Pt reports the trial of KT did help, though she wonders if it needed to have a little more pull. She was throwing a small basketball in the pool 2 days ago and, while she did try to really focus on using her scapular support, her arm still got really sore and today she is still feeling tight/discomfort in the UT and axilla. OBJECTIVE:    Observation: pain with [place] volleyball serve into CBA.     Test measurements:   MMT: shoulder flex 4+ R, 5/5 L, abd 5/5 B, ER 4 R, 4+ L, IR 4+ R/5 L, triceps 4+ B    RESTRICTIONS/PRECAUTIONS:  Labral tear 3-6 mm posterior and superior    Exercises/Interventions: Right shoulder    Exercise/Equipment Resistance/Repetitions Other comments   Stretching/PROM     Wand     Table Slides     UE [place]     Pulleys     Pendulum          Isometrics     Retraction          Weight shift     Flexion     Abduction     External Rotation     Internal Rotation     Biceps     Triceps          PRE's     Flexion     Abduction     External Rotation  No $ w/ LBW   YVL UE's/GVL LE's 2x20' R/L  VTC's   Internal Rotation     Shrugs     EXT  With scapular pinch   Reverse Flys     Serratus     Horizontal Abd with ER  ER/ABD/ER; change each set   Biceps     Triceps     Retraction     Prone scap retract, ext, T, Y, 90/90 ER  Max tactile and verbal cueing to inc low trap and dec upper trap   Cable Column/Theraband     External Rotation Green 3x1045/45 green 2x10 (ball @ elbow)   Internal Rotation 1. Green band 3x102. 45/45 green 2x10 ea   Shrugs     Lats     Ext     Flex     Scapular Retraction     BIC     TRIC     PNF     Supine HABD  Supine PNF D2    CC low trap tug        Dynamic Stability    Serratus at wall  Standing plank on wall Foam roll; 2x10  Blue band   Quadruped bird dog    Body blade 30 deg abd     hold 90 deg flex         Plyoback          Manual interventions     STM, trigger point, :  R upper trap, IS/terres minor 15 min    Rhym stabilization  Eyes closed   KT tape: scap retract,depress 5 min      Patient Education:  Access Code: [email or web address]  URL: [email or web address]  Date: 05/03/2022  Prepared by: Delilah Johns    Exercises  Sidelying Shoulder External Rotation - 1 x daily - 7 x weekly - 3 sets - 10 reps  Supine Scapular Protraction in Flexion with Dumbbells - 1 x daily - 7 x weekly - 3 sets - 10 reps  Prone Shoulder Extension - Single Arm - 1 x daily - 7 x weekly - 3 sets - 10 reps  Prone Shoulder Row - 1 x daily - 7 x weekly - 3 sets - 10 reps  Prone Shoulder Horizontal Abduction with Thumbs Up - 1 x daily - 7 x weekly - 3 sets - 10 reps  Shoulder Internal Rotation with Resistance - 1 x daily - 7 x weekly - 3 sets - 10 reps  Serratus Activation at Wall with Foam Roll and Resistance Band - 1 x daily - 7 x weekly - 3 sets - 10 reps      Therapeutic Exercise and NMR EXR  [x] (50010) Provided verbal/tactile cueing for activities related to strengthening, flexibility, endurance, ROM  for improvements in scapular, scapulothoracic and UE control with self care, reaching, carrying, lifting, house/yardwork, driving/computer work.    [] (96642) Provided verbal/tactile cueing for activities related to improving balance, coordination, kinesthetic sense, posture, motor skill, proprioception  to assist with  scapular, scapulothoracic and UE control with self care, reaching, carrying, lifting, house/yardwork, driving/computer work. Therapeutic Activities:    [] (92536 or 26376) Provided verbal/tactile cueing for activities related to improving balance, coordination, kinesthetic sense, posture, motor skill, proprioception and motor activation to allow for proper function of scapular, scapulothoracic and UE control with self care, carrying, lifting, driving/computer work. Home Exercise Program:    [x] (53290) Reviewed/Progressed HEP activities related to strengthening, flexibility, endurance, ROM of scapular, scapulothoracic and UE control with self care, reaching, carrying, lifting, house/yardwork, driving/computer work  [x] (06950) Reviewed/Progressed HEP activities related to improving balance, coordination, kinesthetic sense, posture, motor skill, proprioception of scapular, scapulothoracic and UE control with self care, reaching, carrying, lifting, house/yardwork, driving/computer work      Access Code: [email or web address]  URL: [email or web address]  Date: 06/22/2022  Prepared by: Carlos Stauffer Nierman    Exercises  Sidelying Shoulder ER with Towel and Dumbbell - 1 x daily - 7 x weekly - 3 sets - 10 reps  Prone Scapular Retraction - 1 x daily - 7 x weekly - 3 sets - 10 reps  Prone Scapular Slide with Shoulder Extension - 1 x daily - 7 x weekly - 3 sets - 10 reps  Prone Single Arm Shoulder Horizontal Abduction with Scapular Retraction and Palm Down - 1 x daily - 7 x weekly - 3 sets - 10 reps  Prone Scapular Retraction Y - 1 x daily - 7 x weekly - 3 sets - 10 reps  Prone Scapular Retraction with Shoulder External Rotation in Abduction - 1 x daily - 7 x weekly - 3 sets - 10 reps  Quadruped Alternating Arm Lift - 1 x daily - 7 x weekly - 3 sets - 10 reps  Push Up with Plus - 1 x daily - 7 x weekly - 3 sets - 10 reps  Shoulder External Rotation and Scapular Retraction with Resistance - 1 x daily - 7 x weekly - 3 sets - 10 reps    Manual Treatments:  PROM / STM / Oscillations-Mobs:  G-I, II, III, IV (PA's, Inf., Post.)  [x] (24930) Provided manual therapy to mobilize soft tissue/joints of cervical/CT, scapular GHJ and UE for the purpose of modulating pain, promoting relaxation,  increasing ROM, reducing/eliminating soft tissue swelling/inflammation/restriction, improving soft tissue extensibility and allowing for proper ROM for normal function with self care, reaching, carrying, lifting, house/yardwork, driving/computer work      Modalities:  Ice x 10 min    Charges:  Timed Code Treatment Minutes: 45   Total Treatment Minutes: 55       [] EVAL (LOW) 02289 (typically 20 minutes face-to-face)  [] EVAL (MOD) 37800 (typically 30 minutes face-to-face)  [] EVAL (HIGH) 24408 (typically 45 minutes face-to-face)  [] RE-EVAL     [] LV(17249) x     [] IONTO  [x] NMR (19011) x  2   [] VASO  [x] Manual (61571) x 1     [] Other:  [] TA x      [] Mech Traction (27978)  [] ES(attended) (55421)      [] ES (un) (42831):       GOALS:   Patient stated goal: pain decrease after activity. Cleaning and volleyball. []?  Progressing: []? Met: []? Not Met: []? Adjusted    Therapist goals for Patient:   Short Term Goals: To be achieved in: 2 weeks  1. Independent in HEP and progression per patient tolerance, in order to prevent re-injury. []? Progressing: [x]? Met: []? Not Met: []? Adjusted   2. Patient will have a decrease in pain to facilitate improvement in movement, function, and ADLs as indicated by Functional Deficits. []? Progressing: [x]? Met: []? Not Met: []? Adjusted     Long Term Goals: To be achieved in: 8 weeks  1. Disability index score of  80 or higher to assist with reaching prior level of function. []? Progressing: []? Met: [x]? Not Met: []? Adjusted  2. Patient will demonstrate increased AROM to WNL to allow for proper joint functioning as indicated by patients Functional Deficits. [x]? Progressing: []? Met: []? Not Met: []? Adjusted  3. Patient will demonstrate an increase in strength to good scapular and core control  for UE to allow for proper functional mobility as indicated by patients Functional Deficits. [x]? Progressing: []? Met: []? Not Met: []? Adjusted  4. Patient will return to household functional activities without increased symptoms or restriction. [x]? Progressing: []? Met: []? Not Met: []? Adjusted  5. Pt will have less pain after playing volleyball. []? Progressing: []? Met: [x]? Not Met: []? Adjusted    Overall Progression Towards Functional goals/ Treatment Progress Update:  [x] Patient is progressing as expected towards functional goals listed. [] Progression is slowed due to complexities/Impairments listed. [] Progression has been slowed due to co-morbidities.   [] Plan just implemented, too soon to assess goals progression <30days   [] Goals require adjustment due to lack of progress  [] Patient is not progressing as expected and requires additional follow up with physician  [] Other    Prognosis for POC: [x] Good [] Fair  [] Poor      Patient requires continued skilled intervention: [x] Yes  [] No    Treatment/Activity Tolerance:  [x] Patient able to complete treatment  [x] Patient limited by fatigue  [] Patient limited by pain     [] Patient limited by other medical complications  [x] Other: Pt does show slow improvements in RTC and shoulder flex/abd strength, but continues to need heavy VTC's for MT/LT control and decreased rounded shoulder posture/UT overuse. FOTO score shown regressed due to recent aggravation of shoulder from throwing this weekend. PLAN: Strength and stabilization exercises. [x] Continue per plan of care [] Alter current plan (see comments above)  [] Plan of care initiated [] Hold pending MD visit [] Discharge      Electronically signed by:  Luz Aguilar, PT    Note: If patient does not return for scheduled/ recommended follow up visits, this note will serve as a discharge from care along with most recent update on progress.

## 2022-07-12 ENCOUNTER — HOSPITAL ENCOUNTER (OUTPATIENT)
Dept: PHYSICAL THERAPY | Age: 37
Setting detail: THERAPIES SERIES
Discharge: HOME | End: 2022-07-12

## 2022-07-13 ENCOUNTER — HOSPITAL ENCOUNTER (OUTPATIENT)
Dept: PHYSICAL THERAPY | Age: 37
Setting detail: THERAPIES SERIES
Discharge: HOME | End: 2022-07-13

## 2022-07-13 NOTE — FLOWSHEET NOTE
[address], [address] [place] [address] Road  Phone: [phone]  Fax [phone]    Physical Therapy  Cancellation/No-show Note  Patient Name:  Ashlyn Lee  :  1985   Date:  2022  Cancelled visits to date: 2  No-shows to date: 0    Patient status for today's appointment patient:  [x]  Cancelled  []  Rescheduled appointment  []  No-show     Reason given by patient:  []  Patient ill  []  Conflicting appointment  []  No transportation    []  Conflict with work  []  No reason given  [x]  Other:     Comments:  Child ill    Electronically signed by:  Darron Morton, PT

## 2022-08-02 ENCOUNTER — OFFICE VISIT (OUTPATIENT)
Dept: ORTHOPEDIC SURGERY | Age: 37
End: 2022-08-02
Payer: COMMERCIAL

## 2022-08-02 VITALS — BODY MASS INDEX: 25.9 KG/M2 | HEIGHT: 67 IN | WEIGHT: 165 LBS

## 2022-08-02 DIAGNOSIS — S43.431A SUPERIOR LABRUM ANTERIOR-TO-POSTERIOR (SLAP) TEAR OF RIGHT SHOULDER: Primary | ICD-10-CM

## 2022-08-02 PROCEDURE — G8419 CALC BMI OUT NRM PARAM NOF/U: HCPCS | Performed by: ORTHOPAEDIC SURGERY

## 2022-08-02 PROCEDURE — 1036F TOBACCO NON-USER: CPT | Performed by: ORTHOPAEDIC SURGERY

## 2022-08-02 PROCEDURE — 99213 OFFICE O/P EST LOW 20 MIN: CPT | Performed by: ORTHOPAEDIC SURGERY

## 2022-08-02 PROCEDURE — G8427 DOCREV CUR MEDS BY ELIG CLIN: HCPCS | Performed by: ORTHOPAEDIC SURGERY

## 2022-08-08 ENCOUNTER — TELEPHONE (OUTPATIENT)
Dept: ORTHOPEDIC SURGERY | Age: 37
End: 2022-08-08

## 2022-08-08 NOTE — TELEPHONE ENCOUNTER
Surgery and/or Procedure Scheduling     Contact Name: Abdon Nur  Surgical/Procedure Request: RIGHT SHOULDER LABRUM  Patient Contact Number: [phone]

## 2022-08-10 ENCOUNTER — TELEPHONE (OUTPATIENT)
Dept: ORTHOPEDIC SURGERY | Age: 37
End: 2022-08-10

## 2022-08-14 NOTE — PROGRESS NOTES
31years old. SLAP repair in patients 39 and older is associated with sharply higher risks of failure because of postoperative pain and/or stiffness. She will let us know when she is ready to consider this. Greater than 15 minutes were expended on all aspects of today's visit. Ivette Martinez MD, PhD  8/2/2022

## 2022-08-16 NOTE — TELEPHONE ENCOUNTER
Patient call and she would like to get scheduled for sx  on her rt shoulder she just need to have someone give her a call back. Please Advise.

## 2022-08-30 ENCOUNTER — TELEPHONE (OUTPATIENT)
Dept: ORTHOPEDIC SURGERY | Age: 37
End: 2022-08-30

## 2022-09-01 NOTE — TELEPHONE ENCOUNTER
Auth: # [id number]    Date: 09/20/22 thru 12/19/22  Type of SX:  Outpatient  Location: [facility]  CPT: 00976   DX Code: G48.515I  SX area: Rt shoulder  Insurance: [facility]

## 2022-09-15 ENCOUNTER — OFFICE VISIT (OUTPATIENT)
Dept: ORTHOPEDIC SURGERY | Age: 37
End: 2022-09-15
Payer: COMMERCIAL

## 2022-09-15 VITALS — WEIGHT: 165 LBS | HEIGHT: 67 IN | BODY MASS INDEX: 25.9 KG/M2

## 2022-09-15 DIAGNOSIS — S43.431A SUPERIOR LABRUM ANTERIOR-TO-POSTERIOR (SLAP) TEAR OF RIGHT SHOULDER: Primary | ICD-10-CM

## 2022-09-15 DIAGNOSIS — M75.21 BICEPS TENDINITIS ON RIGHT: ICD-10-CM

## 2022-09-15 PROCEDURE — 1036F TOBACCO NON-USER: CPT | Performed by: ORTHOPAEDIC SURGERY

## 2022-09-15 PROCEDURE — G8419 CALC BMI OUT NRM PARAM NOF/U: HCPCS | Performed by: ORTHOPAEDIC SURGERY

## 2022-09-15 PROCEDURE — L3670 SO ACRO/CLAV CAN WEB PRE OTS: HCPCS | Performed by: ORTHOPAEDIC SURGERY

## 2022-09-15 PROCEDURE — MISCCOLD COLD THERAPY UNIT AND PAD: Performed by: ORTHOPAEDIC SURGERY

## 2022-09-15 PROCEDURE — 99213 OFFICE O/P EST LOW 20 MIN: CPT | Performed by: ORTHOPAEDIC SURGERY

## 2022-09-15 PROCEDURE — G8427 DOCREV CUR MEDS BY ELIG CLIN: HCPCS | Performed by: ORTHOPAEDIC SURGERY

## 2022-09-15 NOTE — PROGRESS NOTES
Chief Complaint    Pre-op Exam (Right Shoulder)      History of Present Illness: Bernardino Marcus is a pleasant, 30 y.o., female, here today for a pre-operative exam of her right shoulder. To recap this patient has exhausted nonoperative treatment for pain related to a SLAP lesion as demonstrated on MRI. She has elected to undergo surgical intervention for this problem as her current pain and weakness greatly impacts her quality of life. She is an avid  and has not been able to play recently due to her shoulder pain. This is quite frustrating for her. She is currently on the surgery schedule for 9/20/22. She reports no new injuries or setbacks. Pain Assessment  Location of Pain: Shoulder  Location Modifiers: Right  Severity of Pain: 1  Quality of Pain: Sharp, Aching  Duration of Pain: Persistent  Frequency of Pain: Intermittent  Aggravating Factors:  (certain movements)  Limiting Behavior: Yes  Relieving Factors: Rest, Ice  Work-Related Injury: No  Are there other pain locations you wish to document?: No      Medical History:  Patient's medications, allergies, past medical, surgical, social and family histories were reviewed and updated as appropriate. No notes on file    Review of Systems  A 14 point review of systems was completed by the patient and is available in the media section of the scanned medical record and was reviewed on 9/15/2022. The review is negative with the exception of those things mentioned in the HPI and Past Medical History    Vital Signs:  Vitals:       General/Appearance: Alert and oriented and in no apparent distress. Skin:  There are no skin lesions, cellulitis, or extreme edema. The patient has warm and well-perfused Bilateral upper extremities with brisk capillary refill. Right Shoulder Exam:  Inspection: No gross deformities, no signs of infection. Palpation: No crepitus, tenderness over the biceps    Active Range of Motion:   Forward Elevation 160, with an ultrasling brace to be worn postoperatively. She would like to purchase a cold unit today. We can arrange for this today. We will see Judith Hudson back for surgery weeks and/or as needed. All questions were answered to patient's satisfaction and She was encouraged to call with any further questions or concerns. Lamar Ferrer is in agreement with this plan. Risks, benefits and potential complications of arthroscopic shoulder surgery were discussed with the patient. Risks discussed include but are not limited to bleeding, infection, anesthetic risk, injury to nerves and blood vessels, deep vein thrombosis, residual stiffness and weakness, and the need for revision surgery. The patient also understands that anesthetic risks include cardiopulmonary issues, drug reactions and even death. The patient voices an understanding of the importance of physical therapy and home exercises after surgery. All questions were answered and written informed consent for surgery was obtained today. The patient was counseled at length about the risks of kate Covid-19 during their perioperative period and any recovery window from their procedure. The patient was made aware that kate Covid-19  may worsen their prognosis for recovering from their procedure  and lend to a higher morbidity and/or mortality risk. All material risks, benefits, and reasonable alternatives including postponing the procedure were discussed. The patient does wish to proceed with the procedure at this time. 9/15/2022  4:40 PM    Blanca Fernandes ATC  [employer] [address]. Heriberto Eddy    During this examination, IKylee, functioned as a scribe for Dr. Gaetano Cota. The history taking and physical examination were performed by Dr. Gui Becerril. All counseling during the appointment was performed between the patient and Dr. Gui Beecrril.  9/15/22    ______________  FOIZA, Dr. Gaetano Cota, personally performed the services described in this documentation as described by Kanu Velez ATC in my presence, and it is both accurate and complete. Ivette Quezada MD, PhD  9/15/2022

## 2022-09-16 NOTE — PROGRESS NOTES
CHLOE STARKEY AT [facility], REQ H&P TO BE COMPLETED WITH PHYS EXAM & REFAXED-DG    9/19/22 @ 3291  EKG not sent from PCP  if wanted will need DOS.  Shagufta Laboy

## 2022-09-16 NOTE — PROGRESS NOTES
instructions to follow. If you did not receive these, call your surgeon's office immediately. 5. MEDICATIONS:  Take the following medications with a SMALL sip of water: SYNTHROID CYTOMEL  Use your usual dose of inhalers the morning of surgery. BRING your rescue inhaler with you to hospital.   Anesthesia does NOT want you to take insulin the morning of surgery. They will control your blood sugar while you are at the hospital. Please contact your ordering physician for instructions regarding your insulin the night before your procedure. If you have an insulin pump, please keep it set on basal rate. Bariatric patient's call your surgeon if on diabetic medications as some may need to be stopped 1 week prior to surgery    6. Do not swallow additional water when brushing teeth. No gum, candy, mints, or ice chips. Refrain from smoking or at least decrease the amount on day of surgery. 7. Morning of surgery:   Take a shower with an antibacterial soap (i.e., Safeguard or Dial) OR your physician may have instructed you to use Hibiclens. Dress in loose, comfortable clothing appropriate for redressing after your procedure. Do not wear jewelry (including body piercings), make-up (especially NO eye make-up), fingernail polish (NO toenail polish if foot/leg surgery), lotion, powders, or metal hairclips. Do not shave or wax for 72 hours prior to procedure near your operative site. Shaving with a razor can irritate your skin and make it easier to develop an infection. On the day of your procedure, any hair that needs to be removed near the surgical site will be 'clipped' by a healthcare worker using a special clipper designed to avoid skin irritation. 8. Dentures, glasses, or contacts will need to be removed before your procedure. Bring cases for your glasses, contacts, dentures, or hearing aids to protect them while you are in surgery.       9. If you use a CPAP, please bring it with you on the day of your procedure. 10. We recommend that valuable personal belongings such as cash, cell phones, e-tablets, or jewelry, be left at home during your stay. The hospital will not be responsible for valuables that are not secured in the hospital safe. However, if your insurance requires a co-pay, you may want to bring a method of payment, i.e., Check or credit card, if you wish to pay your co-pay the day of surgery. 11. If you are to stay overnight, you may bring a bag with personal items. Please have any large items you may need brought in by your family after your arrival to your hospital room. 12. If you have a Living Will or Durable Power of , please bring a copy on the day of your procedure. How we keep you safe and work to prevent surgical site infections:   1. Health care workers should always check your ID bracelet to verify your name and birth date. You will be asked many times to state your name, date of birth, and allergies. 2. Health care workers should always clean their hands with soap or alcohol gel before providing care to you. It is okay to ask anyone if they cleaned their hands before they touch you. 3. You will be actively involved in verifying the type of procedure you are having and ensuring the correct surgical site. This will be confirmed multiple times prior to your procedure. Do NOT bonifacio your surgery site UNLESS instructed to by your surgeon. 4. When you are in the operating room, your surgical site will be cleansed with a special soap, and in most cases, you will be given an antibiotic before the surgery begins. What to expect AFTER your procedure? 1. Immediately following your procedure, your will be taken to the PACU for the first phase of your recovery. Your nurse will help you recover from any potential side effects of anesthesia, such as extreme drowsiness, changes in your vital signs or breathing patterns.  Nausea, headache, muscle aches, or sore throat may also occur after anesthesia. Your nurse will help you manage these potential side effects. 2. For comfort and safety, arrange to have someone at home with you for the first 24 hours after discharge. 3. You and your family will be given written instructions about your diet, activity, dressing care, medications, and return visits. 4. Once at home, should issues with nausea, pain, or bleeding occur, or should you notice any signs of infection, you should call your surgeon. 5. Always clean your hands before and after caring for your wound. Do not let your family touch your surgery site without cleaning their hands. 6. Narcotic pain medications can cause significant constipation. You may want to add a stool softener to your postoperative medication schedule or speak to your surgeon on how best to manage this SIDE EFFECT. SPECIAL INSTRUCTIONS NONE    Thank you for allowing us to care for you. We strive to exceed your expectations in the delivery of care and service provided to you and your family. If you need to contact the [place] [address] staff for any reason, please call us at [phone]    Instructions reviewed with patient during preadmission testing phone interview.   Harinder Newton RN.9/16/2022 .1:24 PM      ADDITIONAL EDUCATIONAL INFORMATION REVIEWED PER PHONE WITH YOU AND/OR YOUR FAMILY:  No Hibiclens® Bathing Instructions   Yes Antibacterial Soap

## 2022-09-16 NOTE — PROGRESS NOTES
Place patient label inside box (if no patient label, complete below)  Name:  :  MR#:     Prasanna Doss / PROCEDURE  I (we), Kathy Cole (Patient Name) authorize Conrad Xie MD (Provider / Santos Unger) and/or such assistants as may be selected by him/her, to perform the following operation/procedure(s): RIGHT SHOULDER ARTHROSCOPY SUPERIOR LABRUM ANTERIOR TO POSTERIOR REPAIR, POSSIBLE OPEN BICEPS TENODESIS       Note: If unable to obtain consent prior to an emergent procedure, document the emergent reason in the medical record. This procedure has been explained to my (our) satisfaction and included in the explanation was: The intended benefit, nature, and extent of the procedure to be performed; The significant risks involved and the probability of success; Alternative procedures and methods of treatment; The dangers and probable consequences of such alternatives (including no procedure or treatment); The expected consequences of the procedure on my future health; Whether other qualified individuals would be performing important surgical tasks and/or whether  would be present to advise or support the procedure. I (we) understand that there are other risks of infection and other serious complications in the pre-operative/procedural and postoperative/procedural stages of my (our) care. I (we) have asked all of the questions which I (we) thought were important in deciding whether or not to undergo treatment or diagnosis. These questions have been answered to my (our) satisfaction. I (we) understand that no assurance can be given that the procedure will be a success, and no guarantee or warranty of success has been given to me (us).     It has been explained to me (us) that during the course of the operation/procedure, unforeseen conditions may be revealed that necessitate extension of the original procedure(s) or different procedure(s) than those set forth in Paragraph 1. I (we) authorize and request that the above-named physician, his/her assistants or his/her designees, perform procedures as necessary and desirable if deemed to be in my (our) best interest.     Revised 8/2/2021                                                                          Page 1 of 2       I acknowledge that health care personnel may be observing this procedure for the purpose of medical education or other specified purposes as may be necessary as requested and/or approved by my (our) physician. I (we) consent to the disposal by the hospital Pathologist of the removed tissue, parts or organs in accordance with hospital policy. I do ____ do not ____ consent to the use of a local infiltration pain blocking agent that will be used by my provider/surgical provider to help alleviate pain during my procedure. I do ____ do not ____ consent to an emergent blood transfusion in the case of a life-threatening situation that requires blood components to be administered. This consent is valid for 24 hours from the beginning of the procedure. This patient does ____ or does not ____ currently have a DNR status/order. If DNR order is in place, obtain Addendum to the Surgical Consent for ALL Patients with a DNR Order to address michelle-operative status for limited intervention or DNR suspension.      I have read and fully understand the above Consent for Operation/Procedure and that all blanks were completed before I signed the consent.   _____________________________       _____________________      ____/____am/pm  Signature of Patient or legal representative      Printed Name / Relationship            Date / Time   ____________________________       _____________________      ____/____am/pm  Witness to Signature                                    Printed Name                    Date / Time    If patient is unable to sign or is a minor, complete the following)  Patient is a minor, ____ years of age, or unable to sign because:   ______________________________________________________________________________________________    If a phone consent is obtained, consent will be documented by using two health care professionals, each affirming that the consenting party has no questions and gives consent for the procedure discussed with the physician/provider.   _____________________          ____________________       _____/_____am/pm   2nd witness to phone consent        Printed name           Date / Time    Informed Consent:  I have provided the explanation described above in section 1 to the patient and/or legal representative.  I have provided the patient and/or legal representative with an opportunity to ask any questions about the proposed operation/procedure.   ___________________________          ____________________         ____/____am/pm  Provider / Proceduralist                            Printed name            Date / Time  Revised 8/2/2021                                                                      Page 2 of 2

## 2022-09-19 ENCOUNTER — ANESTHESIA EVENT (OUTPATIENT)
Dept: OPERATING ROOM | Age: 37
End: 2022-09-19
Payer: COMMERCIAL

## 2022-09-20 ENCOUNTER — HOSPITAL ENCOUNTER (OUTPATIENT)
Age: 37
Setting detail: OUTPATIENT SURGERY
Discharge: HOME | End: 2022-09-20
Attending: ORTHOPAEDIC SURGERY | Admitting: ORTHOPAEDIC SURGERY
Payer: COMMERCIAL

## 2022-09-20 ENCOUNTER — ANESTHESIA (OUTPATIENT)
Dept: OPERATING ROOM | Age: 37
End: 2022-09-20
Payer: COMMERCIAL

## 2022-09-20 VITALS
WEIGHT: 151.04 LBS | TEMPERATURE: 97.1 F | BODY MASS INDEX: 23.71 KG/M2 | OXYGEN SATURATION: 100 % | RESPIRATION RATE: 13 BRPM | DIASTOLIC BLOOD PRESSURE: 79 MMHG | SYSTOLIC BLOOD PRESSURE: 124 MMHG | HEIGHT: 67 IN | HEART RATE: 63 BPM

## 2022-09-20 DIAGNOSIS — M67.921 TENDINOPATHY OF RIGHT BICEPS TENDON: Primary | ICD-10-CM

## 2022-09-20 LAB
GLUCOSE BLD-MCNC: 128 MG/DL (ref 70–99)
GLUCOSE BLD-MCNC: 142 MG/DL (ref 70–99)
PERFORMED ON: ABNORMAL
PERFORMED ON: ABNORMAL
PREGNANCY, URINE: NEGATIVE

## 2022-09-20 PROCEDURE — 3700000001 HC ADD 15 MINUTES (ANESTHESIA): Performed by: ORTHOPAEDIC SURGERY

## 2022-09-20 PROCEDURE — 2500000003 HC RX 250 WO HCPCS: Performed by: ANESTHESIOLOGY

## 2022-09-20 PROCEDURE — 7100000011 HC PHASE II RECOVERY - ADDTL 15 MIN: Performed by: ORTHOPAEDIC SURGERY

## 2022-09-20 PROCEDURE — 7100000010 HC PHASE II RECOVERY - FIRST 15 MIN: Performed by: ORTHOPAEDIC SURGERY

## 2022-09-20 PROCEDURE — 7100000001 HC PACU RECOVERY - ADDTL 15 MIN: Performed by: ORTHOPAEDIC SURGERY

## 2022-09-20 PROCEDURE — C9290 INJ, BUPIVACAINE LIPOSOME: HCPCS | Performed by: ANESTHESIOLOGY

## 2022-09-20 PROCEDURE — 3600000014 HC SURGERY LEVEL 4 ADDTL 15MIN: Performed by: ORTHOPAEDIC SURGERY

## 2022-09-20 PROCEDURE — 64415 NJX AA&/STRD BRCH PLXS IMG: CPT | Performed by: ANESTHESIOLOGY

## 2022-09-20 PROCEDURE — C1713 ANCHOR/SCREW BN/BN,TIS/BN: HCPCS | Performed by: ORTHOPAEDIC SURGERY

## 2022-09-20 PROCEDURE — 2580000003 HC RX 258: Performed by: ORTHOPAEDIC SURGERY

## 2022-09-20 PROCEDURE — 3700000000 HC ANESTHESIA ATTENDED CARE: Performed by: ORTHOPAEDIC SURGERY

## 2022-09-20 PROCEDURE — 2580000003 HC RX 258: Performed by: ANESTHESIOLOGY

## 2022-09-20 PROCEDURE — 6360000002 HC RX W HCPCS: Performed by: ORTHOPAEDIC SURGERY

## 2022-09-20 PROCEDURE — 2709999900 HC NON-CHARGEABLE SUPPLY: Performed by: ORTHOPAEDIC SURGERY

## 2022-09-20 PROCEDURE — 84703 CHORIONIC GONADOTROPIN ASSAY: CPT

## 2022-09-20 PROCEDURE — 6360000002 HC RX W HCPCS: Performed by: ANESTHESIOLOGY

## 2022-09-20 PROCEDURE — 2500000003 HC RX 250 WO HCPCS: Performed by: NURSE ANESTHETIST, CERTIFIED REGISTERED

## 2022-09-20 PROCEDURE — 2500000003 HC RX 250 WO HCPCS: Performed by: ORTHOPAEDIC SURGERY

## 2022-09-20 PROCEDURE — 3600000004 HC SURGERY LEVEL 4 BASE: Performed by: ORTHOPAEDIC SURGERY

## 2022-09-20 PROCEDURE — 7100000000 HC PACU RECOVERY - FIRST 15 MIN: Performed by: ORTHOPAEDIC SURGERY

## 2022-09-20 PROCEDURE — 2580000003 HC RX 258: Performed by: NURSE ANESTHETIST, CERTIFIED REGISTERED

## 2022-09-20 PROCEDURE — 2720000010 HC SURG SUPPLY STERILE: Performed by: ORTHOPAEDIC SURGERY

## 2022-09-20 PROCEDURE — 6360000002 HC RX W HCPCS: Performed by: NURSE ANESTHETIST, CERTIFIED REGISTERED

## 2022-09-20 DEVICE — SCREW INTRF L15MM DIA4.75MM W/ SZ 2 FIBERWIRE SUT AND DISP: Type: IMPLANTABLE DEVICE | Site: SHOULDER | Status: FUNCTIONAL

## 2022-09-20 RX ORDER — FENTANYL CITRATE 50 UG/ML
25 INJECTION, SOLUTION INTRAMUSCULAR; INTRAVENOUS EVERY 5 MIN PRN
Status: DISCONTINUED | OUTPATIENT
Start: 2022-09-20 | End: 2022-09-20 | Stop reason: HOSPADM

## 2022-09-20 RX ORDER — BUPIVACAINE HYDROCHLORIDE 5 MG/ML
INJECTION, SOLUTION EPIDURAL; INTRACAUDAL
Status: COMPLETED
Start: 2022-09-20 | End: 2022-09-20

## 2022-09-20 RX ORDER — FENTANYL CITRATE 50 UG/ML
50 INJECTION, SOLUTION INTRAMUSCULAR; INTRAVENOUS EVERY 5 MIN PRN
Status: DISCONTINUED | OUTPATIENT
Start: 2022-09-20 | End: 2022-09-20 | Stop reason: HOSPADM

## 2022-09-20 RX ORDER — PROPOFOL 10 MG/ML
INJECTION, EMULSION INTRAVENOUS PRN
Status: DISCONTINUED | OUTPATIENT
Start: 2022-09-20 | End: 2022-09-20 | Stop reason: SDUPTHER

## 2022-09-20 RX ORDER — FENTANYL CITRATE 50 UG/ML
INJECTION, SOLUTION INTRAMUSCULAR; INTRAVENOUS
Status: COMPLETED
Start: 2022-09-20 | End: 2022-09-20

## 2022-09-20 RX ORDER — SODIUM CHLORIDE 9 MG/ML
INJECTION, SOLUTION INTRAVENOUS PRN
Status: DISCONTINUED | OUTPATIENT
Start: 2022-09-20 | End: 2022-09-20 | Stop reason: HOSPADM

## 2022-09-20 RX ORDER — ONDANSETRON 4 MG/1
4 TABLET, ORALLY DISINTEGRATING ORAL 3 TIMES DAILY PRN
Qty: 21 TABLET | Refills: 1 | Status: SHIPPED | OUTPATIENT
Start: 2022-09-20 | End: 2022-10-20

## 2022-09-20 RX ORDER — SODIUM CHLORIDE 0.9 % (FLUSH) 0.9 %
5-40 SYRINGE (ML) INJECTION PRN
Status: DISCONTINUED | OUTPATIENT
Start: 2022-09-20 | End: 2022-09-20 | Stop reason: HOSPADM

## 2022-09-20 RX ORDER — ONDANSETRON 2 MG/ML
4 INJECTION INTRAMUSCULAR; INTRAVENOUS
Status: DISCONTINUED | OUTPATIENT
Start: 2022-09-20 | End: 2022-09-20 | Stop reason: HOSPADM

## 2022-09-20 RX ORDER — LABETALOL HYDROCHLORIDE 5 MG/ML
10 INJECTION, SOLUTION INTRAVENOUS
Status: DISCONTINUED | OUTPATIENT
Start: 2022-09-20 | End: 2022-09-20 | Stop reason: HOSPADM

## 2022-09-20 RX ORDER — PROCHLORPERAZINE EDISYLATE 5 MG/ML
5 INJECTION INTRAMUSCULAR; INTRAVENOUS
Status: DISCONTINUED | OUTPATIENT
Start: 2022-09-20 | End: 2022-09-20 | Stop reason: HOSPADM

## 2022-09-20 RX ORDER — MIDAZOLAM HYDROCHLORIDE 1 MG/ML
INJECTION INTRAMUSCULAR; INTRAVENOUS PRN
Status: DISCONTINUED | OUTPATIENT
Start: 2022-09-20 | End: 2022-09-20 | Stop reason: SDUPTHER

## 2022-09-20 RX ORDER — SUCCINYLCHOLINE/SOD CL,ISO/PF 200MG/10ML
SYRINGE (ML) INTRAVENOUS PRN
Status: DISCONTINUED | OUTPATIENT
Start: 2022-09-20 | End: 2022-09-20 | Stop reason: SDUPTHER

## 2022-09-20 RX ORDER — PHENYLEPHRINE HYDROCHLORIDE 10 MG/ML
INJECTION INTRAVENOUS PRN
Status: DISCONTINUED | OUTPATIENT
Start: 2022-09-20 | End: 2022-09-20 | Stop reason: SDUPTHER

## 2022-09-20 RX ORDER — FENTANYL CITRATE 50 UG/ML
INJECTION, SOLUTION INTRAMUSCULAR; INTRAVENOUS PRN
Status: DISCONTINUED | OUTPATIENT
Start: 2022-09-20 | End: 2022-09-20 | Stop reason: SDUPTHER

## 2022-09-20 RX ORDER — SENNA PLUS 8.6 MG/1
1 TABLET ORAL 2 TIMES DAILY
Qty: 60 TABLET | Refills: 0 | Status: SHIPPED | OUTPATIENT
Start: 2022-09-20 | End: 2022-10-20

## 2022-09-20 RX ORDER — SODIUM CHLORIDE, SODIUM LACTATE, POTASSIUM CHLORIDE, CALCIUM CHLORIDE 600; 310; 30; 20 MG/100ML; MG/100ML; MG/100ML; MG/100ML
INJECTION, SOLUTION INTRAVENOUS CONTINUOUS
Status: DISCONTINUED | OUTPATIENT
Start: 2022-09-20 | End: 2022-09-20 | Stop reason: HOSPADM

## 2022-09-20 RX ORDER — TRIAMCINOLONE ACETONIDE 1 MG/G
CREAM TOPICAL
COMMUNITY
Start: 2022-09-07

## 2022-09-20 RX ORDER — HYDRALAZINE HYDROCHLORIDE 20 MG/ML
10 INJECTION INTRAMUSCULAR; INTRAVENOUS
Status: DISCONTINUED | OUTPATIENT
Start: 2022-09-20 | End: 2022-09-20 | Stop reason: HOSPADM

## 2022-09-20 RX ORDER — ROCURONIUM BROMIDE 10 MG/ML
INJECTION, SOLUTION INTRAVENOUS PRN
Status: DISCONTINUED | OUTPATIENT
Start: 2022-09-20 | End: 2022-09-20 | Stop reason: SDUPTHER

## 2022-09-20 RX ORDER — BUPIVACAINE HYDROCHLORIDE 5 MG/ML
INJECTION, SOLUTION EPIDURAL; INTRACAUDAL PRN
Status: DISCONTINUED | OUTPATIENT
Start: 2022-09-20 | End: 2022-09-20 | Stop reason: SDUPTHER

## 2022-09-20 RX ORDER — DEXAMETHASONE SODIUM PHOSPHATE 4 MG/ML
INJECTION, SOLUTION INTRA-ARTICULAR; INTRALESIONAL; INTRAMUSCULAR; INTRAVENOUS; SOFT TISSUE PRN
Status: DISCONTINUED | OUTPATIENT
Start: 2022-09-20 | End: 2022-09-20 | Stop reason: SDUPTHER

## 2022-09-20 RX ORDER — BUPIVACAINE HYDROCHLORIDE 5 MG/ML
INJECTION, SOLUTION EPIDURAL; INTRACAUDAL PRN
Status: DISCONTINUED | OUTPATIENT
Start: 2022-09-20 | End: 2022-09-20 | Stop reason: HOSPADM

## 2022-09-20 RX ORDER — SODIUM CHLORIDE, SODIUM LACTATE, POTASSIUM CHLORIDE, CALCIUM CHLORIDE 600; 310; 30; 20 MG/100ML; MG/100ML; MG/100ML; MG/100ML
INJECTION, SOLUTION INTRAVENOUS CONTINUOUS PRN
Status: DISCONTINUED | OUTPATIENT
Start: 2022-09-20 | End: 2022-09-20 | Stop reason: SDUPTHER

## 2022-09-20 RX ORDER — ASPIRIN 325 MG
325 TABLET, DELAYED RELEASE (ENTERIC COATED) ORAL 2 TIMES DAILY
Qty: 28 TABLET | Refills: 0 | Status: SHIPPED | OUTPATIENT
Start: 2022-09-20 | End: 2022-10-20

## 2022-09-20 RX ORDER — LIDOCAINE HYDROCHLORIDE 20 MG/ML
INJECTION, SOLUTION INTRAVENOUS PRN
Status: DISCONTINUED | OUTPATIENT
Start: 2022-09-20 | End: 2022-09-20 | Stop reason: SDUPTHER

## 2022-09-20 RX ORDER — OXYCODONE HYDROCHLORIDE AND ACETAMINOPHEN 5; 325 MG/1; MG/1
1 TABLET ORAL EVERY 6 HOURS PRN
Qty: 28 TABLET | Refills: 0 | Status: SHIPPED | OUTPATIENT
Start: 2022-09-20 | End: 2022-09-27

## 2022-09-20 RX ORDER — SODIUM CHLORIDE 0.9 % (FLUSH) 0.9 %
5-40 SYRINGE (ML) INJECTION EVERY 12 HOURS SCHEDULED
Status: DISCONTINUED | OUTPATIENT
Start: 2022-09-20 | End: 2022-09-20 | Stop reason: HOSPADM

## 2022-09-20 RX ORDER — MIDAZOLAM HYDROCHLORIDE 1 MG/ML
INJECTION INTRAMUSCULAR; INTRAVENOUS
Status: COMPLETED
Start: 2022-09-20 | End: 2022-09-20

## 2022-09-20 RX ORDER — ONDANSETRON 2 MG/ML
INJECTION INTRAMUSCULAR; INTRAVENOUS PRN
Status: DISCONTINUED | OUTPATIENT
Start: 2022-09-20 | End: 2022-09-20 | Stop reason: SDUPTHER

## 2022-09-20 RX ADMIN — VANCOMYCIN HYDROCHLORIDE 1000 MG: 10 INJECTION, POWDER, LYOPHILIZED, FOR SOLUTION INTRAVENOUS at 09:45

## 2022-09-20 RX ADMIN — ROCURONIUM BROMIDE 10 MG: 10 INJECTION INTRAVENOUS at 09:50

## 2022-09-20 RX ADMIN — ROCURONIUM BROMIDE 40 MG: 10 INJECTION INTRAVENOUS at 10:01

## 2022-09-20 RX ADMIN — PHENYLEPHRINE HYDROCHLORIDE 50 MCG: 10 INJECTION INTRAVENOUS at 11:07

## 2022-09-20 RX ADMIN — SODIUM CHLORIDE, POTASSIUM CHLORIDE, SODIUM LACTATE AND CALCIUM CHLORIDE: 600; 310; 30; 20 INJECTION, SOLUTION INTRAVENOUS at 07:56

## 2022-09-20 RX ADMIN — BUPIVACAINE HYDROCHLORIDE 30 ML: 5 INJECTION, SOLUTION EPIDURAL; INTRACAUDAL; PERINEURAL at 08:30

## 2022-09-20 RX ADMIN — LIDOCAINE HYDROCHLORIDE 100 MG: 20 INJECTION, SOLUTION INTRAVENOUS at 09:50

## 2022-09-20 RX ADMIN — PHENYLEPHRINE HYDROCHLORIDE 50 MCG: 10 INJECTION INTRAVENOUS at 10:26

## 2022-09-20 RX ADMIN — SODIUM CHLORIDE, SODIUM LACTATE, POTASSIUM CHLORIDE, AND CALCIUM CHLORIDE: .6; .31; .03; .02 INJECTION, SOLUTION INTRAVENOUS at 09:50

## 2022-09-20 RX ADMIN — FENTANYL CITRATE 100 MCG: 50 INJECTION, SOLUTION INTRAMUSCULAR; INTRAVENOUS at 08:30

## 2022-09-20 RX ADMIN — PHENYLEPHRINE HYDROCHLORIDE 50 MCG: 10 INJECTION INTRAVENOUS at 10:36

## 2022-09-20 RX ADMIN — PHENYLEPHRINE HYDROCHLORIDE 50 MCG: 10 INJECTION INTRAVENOUS at 10:48

## 2022-09-20 RX ADMIN — Medication 140 MG: at 09:50

## 2022-09-20 RX ADMIN — PROPOFOL 200 MG: 10 INJECTION, EMULSION INTRAVENOUS at 09:50

## 2022-09-20 RX ADMIN — SODIUM CHLORIDE, SODIUM LACTATE, POTASSIUM CHLORIDE, AND CALCIUM CHLORIDE: .6; .31; .03; .02 INJECTION, SOLUTION INTRAVENOUS at 10:41

## 2022-09-20 RX ADMIN — DEXAMETHASONE SODIUM PHOSPHATE 8 MG: 4 INJECTION, SOLUTION INTRAMUSCULAR; INTRAVENOUS at 10:04

## 2022-09-20 RX ADMIN — FENTANYL CITRATE 100 MCG: 50 INJECTION, SOLUTION INTRAMUSCULAR; INTRAVENOUS at 11:30

## 2022-09-20 RX ADMIN — MIDAZOLAM HYDROCHLORIDE 2 MG: 2 INJECTION, SOLUTION INTRAMUSCULAR; INTRAVENOUS at 08:30

## 2022-09-20 RX ADMIN — ONDANSETRON 4 MG: 2 INJECTION INTRAMUSCULAR; INTRAVENOUS at 10:04

## 2022-09-20 RX ADMIN — SUGAMMADEX 200 MG: 100 INJECTION, SOLUTION INTRAVENOUS at 11:10

## 2022-09-20 RX ADMIN — BUPIVACAINE 10 ML: 13.3 INJECTION, SUSPENSION, LIPOSOMAL INFILTRATION at 08:30

## 2022-09-20 ASSESSMENT — PAIN SCALES - GENERAL
PAINLEVEL_OUTOF10: 0
PAINLEVEL_OUTOF10: 0
PAINLEVEL_OUTOF10: 2
PAINLEVEL_OUTOF10: 2
PAINLEVEL_OUTOF10: 0
PAINLEVEL_OUTOF10: 2
PAINLEVEL_OUTOF10: 0
PAINLEVEL_OUTOF10: 2
PAINLEVEL_OUTOF10: 0
PAINLEVEL_OUTOF10: 0
PAINLEVEL_OUTOF10: 2
PAINLEVEL_OUTOF10: 0

## 2022-09-20 ASSESSMENT — LIFESTYLE VARIABLES: SMOKING_STATUS: 0

## 2022-09-20 ASSESSMENT — PAIN DESCRIPTION - DESCRIPTORS: DESCRIPTORS: DULL;ACHING

## 2022-09-20 ASSESSMENT — PAIN DESCRIPTION - ORIENTATION: ORIENTATION: RIGHT

## 2022-09-20 ASSESSMENT — PAIN DESCRIPTION - LOCATION: LOCATION: SHOULDER

## 2022-09-20 NOTE — PROGRESS NOTES
PACU Transfer to [facility]    Vitals:    09/20/22 1235   BP:    Pulse: 60   Resp:    Temp:    SpO2:          Intake/Output Summary (Last 24 hours) at 9/20/2022 1237  Last data filed at 9/20/2022 1230  Gross per 24 hour   Intake 1485 ml   Output 625 ml   Net 860 ml       Pain assessment:  none VS stable Voided. Family updated. Patient to [facility] bed #5 for discharge home. Scripts escribed to patients pharmacy.    Pain Level: 0    Patient transferred to care of [facility] RN.    9/20/2022 12:37 PM

## 2022-09-20 NOTE — PROGRESS NOTES
Patient admitted to PACU # 18 from OR at [address]2 post [facility], DIAGNOSTIC ARTHROSCOPY, ARTHRSCOPIC EXTENSIVE DEBRIDEMENT, COMPLETE BURSECTOMY AND OPEN BICEPS TENODESIS - Right per Dr. Antolin Anderson. Attached to PACU monitoring system and report received from anesthesia provider. Patient was reported to be hemodynamically stable during procedure. Patient arrived to pacu with oral airway in place and on 6 L NC. Oral airway was removed by CRNA shortly after arrival. RUE surgical drsg c/d/I with immobilizer in place. Pt NSR on monitor. Pt continue to monitor.

## 2022-09-20 NOTE — BRIEF OP NOTE
Brief Postoperative Note      Patient: Ashlyn Snyder  YOB: 1985  MRN: [id number]    Date of Procedure: 9/20/2022    Pre-Op Diagnosis: Superior labrum anterior-to-posterior (SLAP) tear of right shoulder [S43.431A]    Post-Op Diagnosis: Biceps tendinopathy       Procedure(s):  RIGHT SHOULDER ARTHROSCOPY SUPERIOR LABRUM ANTERIOR TO POSTERIOR REPAIR, POSSIBLE OPEN BICEPS TENODESIS    Surgeon(s):  Ulysses Raymond, MD    Assistant:  Surgical Assistant: Libra Aj  Fellow: Jt Wang MD    Anesthesia: General    Estimated Blood Loss (mL): less than 50     Complications: None    Specimens:   * No specimens in log *    Implants:  * No implants in log *      Drains: * No LDAs found *    Findings: See dictated operative report.      Electronically signed by Jt Wang MD on 9/20/2022 at 11:11 AM

## 2022-09-20 NOTE — ANESTHESIA PRE PROCEDURE
DO   100 mcg at 09/20/22 0830    bupivacaine liposome (EXPAREL) 1.3 % injection   SubCUTAneous PRN [facility] Care, DO   10 mL at 09/20/22 0830    bupivacaine (PF) (MARCAINE) 0.5 % injection   IntraDERmal PRN [facility] Care, DO   30 mL at 09/20/22 0830       Allergies: Allergies   Allergen Reactions    Cefaclor Hives     As a child  As a child         Problem List:    Patient Active Problem List   Diagnosis Code    Instability of right shoulder joint M25.311    Anxiety F41.9    Hyperlipidemia associated with type 2 diabetes mellitus ([facility] [address] E11.69, E78.5    Low vitamin B12 level E53.8    Kidney stone N20.0    Obesity (BMI 30.0-34. 9) E66.9    Primary hypothyroidism E03.9    Palpitations R00.2    Tachycardia R00.0    Right shoulder pain M25.511    Type 2 diabetes mellitus with hyperglycemia, without long-term current use of insulin (HCC) E11.65       Past Medical History:        Diagnosis Date    Arthritis     RIGHT SHOULDER    CAD (coronary artery disease)     HEART RATE 180S W EXERCISE-2019 SAW CARDIO    Diabetes mellitus ([facility] [address]     NST (non-stress test) nonreactive     Thyroid disease     Wears contact lenses     Wears glasses        Past Surgical History:        Procedure Laterality Date    CAUTERIZE INNER NOSE      CHILDHOOD    WISDOM TOOTH EXTRACTION      2003?        Social History:    Social History     Tobacco Use    Smoking status: Never    Smokeless tobacco: Never   Substance Use Topics    Alcohol use: Yes     Comment: OCCAS COUPLE WEEK                                Counseling given: Not Answered      Vital Signs (Current):   Vitals:    09/20/22 0836 09/20/22 0837 09/20/22 0838 09/20/22 0839   BP:   134/83    Pulse: 60 58 69 71   Resp: 10 (!) 0 13 16   Temp:       TempSrc:       SpO2: 100% 100% 100% 100%   Weight:       Height:                                                  BP Readings from Last 3 Encounters:   09/20/22 134/83       NPO Status: Time of last liquid consumption: 2230                        Time of last solid consumption: 1930                        Date of last liquid consumption: 09/19/22                        Date of last solid food consumption: 09/19/22    BMI:   Wt Readings from Last 3 Encounters:   09/20/22 151 lb 0.6 oz (68.5 kg)   09/15/22 165 lb (74.8 kg)   08/02/22 165 lb (74.8 kg)     Body mass index is 24.01 kg/m². CBC: No results found for: WBC, RBC, HGB, HCT, MCV, RDW, PLT    CMP: No results found for: NA, K, CL, CO2, BUN, CREATININE, GFRAA, AGRATIO, LABGLOM, GLUCOSE, GLU, PROT, CALCIUM, BILITOT, ALKPHOS, AST, ALT    POC Tests:   Recent Labs     09/20/22  0754   POCGLU 128*       Coags: No results found for: PROTIME, INR, APTT    HCG (If Applicable):   Lab Results   Component Value Date    PREGTESTUR Negative 09/20/2022        ABGs: No results found for: PHART, PO2ART, OJH1POV, CNW1EGV, BEART, S2QWUNFJ     Type & Screen (If Applicable):  No results found for: LABABO, LABRH    Drug/Infectious Status (If Applicable):  No results found for: HIV, HEPCAB    COVID-19 Screening (If Applicable): No results found for: COVID19        Anesthesia Evaluation  Patient summary reviewed and Nursing notes reviewed no history of anesthetic complications:   Airway: Mallampati: I  TM distance: >3 FB   Neck ROM: full  Mouth opening: > = 3 FB   Dental: normal exam         Pulmonary: breath sounds clear to auscultation      (-) not a current smoker                           Cardiovascular:  Exercise tolerance: good (>4 METS),           Rhythm: regular  Rate: normal                    Neuro/Psych:   Negative Neuro/Psych ROS              GI/Hepatic/Renal:        (-) no morbid obesity       Endo/Other:    (+) DiabetesType II DM, well controlled, , hypothyroidism::., .                 Abdominal:             Vascular: negative vascular ROS.          Other Findings:           Anesthesia Plan      general and regional     ASA 2     (Interscalene nerve block)  Induction: intravenous. Anesthetic plan and risks discussed with patient. Plan discussed with CRNA.                     Kade Blanco DO   9/20/2022

## 2022-09-20 NOTE — DISCHARGE INSTRUCTIONS
fluids. Difficulty Passing Urine  [x]Drink extra amounts of fluid today. Notify your physician if you have not urinated within 8 hours after your procedure or you feel uncomfortable. Irritated Throat from a Breathing Tube  [x]Drink extra amounts of fluid today. Lozenges may help. Muscle Aches  [x]You may experience some generalized body aches as your muscles recover from medications used to relax them during surgery. These will gradually subside. MEDICATION INSTRUCTIONS:  []Prescription(S) x     sent with you. Use as directed. When taking pain medications, you may experience the side effect of dizziness or drowsiness. Do not drink alcohol or drive when taking these medications. []Prescription(S) x          Called to Pharmacy Name and location:    [x]Give the list of your medications to your primary care physician on your next visit. Keep your med list updated and carry it with in case of emergencies. [] Narcotic pain medications can cause the side effect of significant constipation. You may want to add a stool softener to your postoperative medication schedule or speak to your surgeon on how best to manage this side effect. NARCOTIC SAFETY:  Your pain medicine is only for you to take. Safely store your medicines. Store pills up high and out of reach of children and pets. Ensure safety caps are snapped tightly  Keep track of how many pills you have left    Unused medication can be disposed of by taking them to a drop-off box or take-back program that is authorized by the [facility]. Access to a site near you can be found on the [facility] [place] [employer] website ([address] [facility].gov). If you have a CPAP machine, it is very important that you use it daily during all periods of sleep and daytime rest during your recovery at home. Surgery and Anesthesia place a significant amount of stress on your body.   Using your CPAP will help keep you safe and lessen the negative effects of that stress. FOLLOW-UP RECOVERY CARE:  [x]Call the office  for follow-up appointment and problems    Watch for these possible complications, symptoms, or side effects of anesthesia. Call physician if they or any other problems occur:  Signs of INFECTION   > Fever over 101°     > Redness, swelling, hardness or warmth at the operative site   >Foul smelling or cloudy drainage at the operative site   Unrelieved PAIN  Unrelieved NAUSEA  Blood soaked dressing. (Some oozing may be normal)  Inability to urinate      Numb, pale, blue, cold or tingling extremity      Physician:  Dr. Gio Crocker    The above instructions were reviewed with patient/significant other. The following additional patient specific information was reviewed with the patient/significant other:  []Procedure/physician specific instructions  []Medication information sheet(S)     I have read and understand the instructions given to me: ____________________________________________   (Patient/S.O. Signature)            Date/time 9/20/2022 12:00 PM         PACU:  [phone]   M-F 700 AM - 7 PM      SAME DAY SERVICES:  [phone] M-F 7AM-6PM        If you smoke STOP. We care about your health!

## 2022-09-20 NOTE — ANESTHESIA POSTPROCEDURE EVALUATION
Department of Anesthesiology  Postprocedure Note    Patient: Damon Carreno  MRN: [id number]  YOB: 1985  Date of evaluation: 9/20/2022      Procedure Summary     Date: 09/20/22 Room / Location: [address] [place] [address] / [address]    Anesthesia Start: 5848 Anesthesia Stop: 2365    Procedure: RIGHT SHOULDER EXAM UNDER ANESTHESIA, DIAGNOSTIC ARTHROSCOPY, ARTHRSCOPIC EXTENSIVE DEBRIDEMENT, COMPLETE BURSECTOMY AND OPEN BICEPS TENODESIS (Right: Shoulder) Diagnosis:       Superior labrum anterior-to-posterior (SLAP) tear of right shoulder      (Superior labrum anterior-to-posterior (SLAP) tear of right shoulder [A34.657V])    Surgeons: Jaylin Mitchell MD Responsible Provider: Christo Urbina DO    Anesthesia Type: general, regional ASA Status: 2          Anesthesia Type: No value filed.     Sebastian Phase I: Sebastian Score: 7    Sebastian Phase II: Sebastian Score: 10      Anesthesia Post Evaluation    Patient location during evaluation: PACU  Patient participation: complete - patient participated  Level of consciousness: awake and alert  Pain score: 0  Airway patency: patent  Nausea & Vomiting: no nausea and no vomiting  Cardiovascular status: blood pressure returned to baseline  Respiratory status: acceptable  Hydration status: euvolemic

## 2022-09-20 NOTE — H&P
Pulse 83   Temp 97.3 °F (36.3 °C) (Temporal)   Resp 16   Ht 5' 6.5\" (1.689 m)   Wt 151 lb 0.6 oz (68.5 kg)   LMP 09/16/2022   SpO2 98%   BMI 24.01 kg/m²      Airway:  Airway patent with no audible stridor    Heart:  Regular rate and rhythm, No murmur noted    Lungs:  No increased work of breathing, good air exchange, clear to auscultation bilaterally, no crackles or wheezing    Abdomen:  Soft, non-distended, non-tender, no masses palpated    ASSESSMENT AND PLAN    Patient is a 30 y.o. female with above specified procedure planned. 1.  The patients history and physical was obtained and signed off by the pre-admission testing department. Patient seen and focused exam done today- no new changes since last physical exam on 9/7/22    2. Access to ancillary services are available per request of the provider.     Gale Brooks, RILEY - CNP     9/20/2022

## 2022-09-20 NOTE — PROGRESS NOTES
Ambulatory Surgery/Procedure Discharge Note    Vitals:    09/20/22 1242   BP: 124/79   Pulse: 63   Resp: 13   Temp: 97.1 °F (36.2 °C)   SpO2: 100%     Pt meets discharge criteria per Sebastian score. In: 9825 [P.O.:100; I.V.:1385]  Out: 625 [Urine:600]    Restroom use offered before discharge. Yes    Pain assessment:  none  Pain Level: 0    Pt and S.O./family states \"ready to go home\". Pt alert and oriented x4. IV removed. Denies N/V or pain. Rt shoulder dressing c/d/I. Sling and ice pack in place to right arm. Voided prior to discharge. Discharge instructions given to pt and  with pt permission. Pt and  verbalized understanding of all instructions. Left with all belongings and discharge instructions. Prescriptions escribed to pharmacy. Patient discharged to home/self care.  Patient discharged via wheel chair by transporter to waiting family/S.O.       9/20/2022 2:04 PM

## 2022-09-21 NOTE — OP NOTE
[facility] [address], [address]                                OPERATIVE REPORT    PATIENT NAME: Marcia Gonzalez                  :        1985  MED REC NO:   [id number]                          ROOM:  ACCOUNT NO:   [de-identified]                           ADMIT DATE: 2022  PROVIDER:     Elvis Granger MD    DATE OF PROCEDURE:  2022    PREOPERATIVE DIAGNOSIS:  Right shoulder biceps tendinopathy, possible  SLAP tear. POSTOPERATIVE DIAGNOSES:  Type 1 SLAP tear with extensive subdeltoid,  subacromial bursitis and adhesions and biceps tendinopathy with  tenosynovitis. PROCEDURES:  Right shoulder examination under anesthesia; diagnostic  arthroscopy; arthroscopic extensive debridement of subacromial bursa,  bursal-sided adhesions as well as superior labrum and rotator interval  scar; open subpectoral biceps tenodesis. ANESTHESIA:  General anesthesia, interscalene block. IV FLUIDS:  1200 mL of crystalloids. ESTIMATED BLOOD LOSS:  25 mL. COMPLICATIONS:  None. SURGEON:  Elvis Granger MD    ASSISTANT:  Vianca Estrada MD  The availability of Dr. Vladimir Lawton as an experienced first assistant was  critically important in executing this arthroscopic procedure. He  was also helpful for retracting the biceps incision so that tenodesis   could be performed using a small discrete incision. IMPLANT:  Arthrex BioComposite tenodesis screw 4.75 mm x1. FINDINGS:  Examination under anesthesia revealed 1+, 2+ posterior  drawer; 1+ inferior sulcus; 1+ anterior drawer. Diagnostic arthroscopy  revealed no labrum tear except for posterior superior with some labral  fraying and partial tearing. This was amenable to debridement. There  was extensive scarring in the superior glenohumeral ligament. Biceps  had quite a bit of tenosynovitis. No articular-sided rotator cuff tear. Glenohumeral articular surfaces looked good. Exploration of the  subacromial space revealed dense subdeltoid and subacromial bursitis,  bursal-sided adhesions. Type 1 acromion was left alone. Exploration of  the bicipital groove for tenodesis revealed extensive tenosynovitis. BRIEF HISTORY AND PRESENTING ILLNESS:  The patient is a 79-year-old  woman, who enjoys playing overhead sports, who presented with chronic  right shoulder pain, mostly anterior and over the biceps. Had failed  multimodal conservative treatments. MRI showed a SLAP tear. She failed  multimodal conservative treatments, so we recommended surgical  intervention. I felt that she is a better candidate for biceps  tenodesis with labrum debridement rather than SLAP repair, but she was  consented for both and we would base this under intraoperative findings. She understood the potential risks and benefits of surgery; understood  the risks such as bleeding, infection, anesthetic risks, injury to nerve  and blood vessels, stiffness, weakness, incomplete pain relief, and need  for further surgery. She understood all of this, wished to proceed, and  gave informed consent. She was scheduled on an elective basis after  appropriate preoperative medical clearance. DESCRIPTION OF PROCEDURE:  On the date of procedure, brought back to the  operating room, placed supine on the operating table. General  anesthesia was established. Preoperative antibiotics and interscalene  block were given in the holding area. Under anesthesia, exam was  carried out with findings as noted above. The patient was positioned  using a Tenet beach-chair positioner in the sitting position. All  pressure points were padded. The patient's right shoulder and arm were  prepped and draped in a standard manner for arthroscopic shoulder  surgery. We used a Tenet Spider for arm position. We began diagnostic  arthroscopy.   The arthroscope was introduced into the glenohumeral joint  through a standard posterior portal. Systematic diagnostic arthroscopy  ensued with findings as noted above. We established an anterior portal  over the rotator interval.  We inserted arthroscopic shaver across the  metal cannula. This was used to debride the superior labrum fraying  down to stable margin. We released some adhesions and debrided some  synovitis from the rotator cuff undersurface and behind the superior  labrum. We also probed the biceps and elected to proceed with tenotomy  as our first step for tenodesis. We percutaneously placed an 18-gauge  spinal needle through the biceps tendon, passed a PDS suture through  that needle, withdrew the needle, and retrieved the suture anteriorly. More medial to that, we used an upbiting basket to tenotomize the biceps  tendon right off the supraglenoid tubercle. Debrided the biceps stump  using a shaver. There also was a thickened band of the superior  glenohumeral ligament adherent to the superior labrum. This was  released. We redirected the arthroscope through the same posterior portal above  the rotator cuff and subacromial space. We established a lateral portal  under direct visualization and dilated the portal with arthroscopic  shaver and performed a thorough bursectomy. There was quite a bit of  bursal adhesions. These were meticulously resected. We resected all  the adhesions off the muscle-tendon junction of the rotator cuff. Placed the arthroscope laterally and completed the bursectomy posterior  and posterolaterally using a shaver and a cautery device. Cautery was  used to release some adhesions in the periphery of the subdeltoid space  and also resect some of the periosteum from the undersurface of the  acromion; however, the undersurface was flat. This was left alone. No  formal acromioplasty was carried out. We inspected the bursal side of the rotator cuff. There was no tear.    We completed the bursectomy using a shaver and a cautery device and this  concluded our arthroscopic work. We then made a 3-cm longitudinal  incision over the pectoralis muscle-tendon junction. The incision was  carried out with a 15 blade through the skin only. We used Metzenbaum  scissors to go through the deep fascia underneath the pectoralis while  retracting it cephalad. Brought the arm internally rotated and placed a  Hohmann retractor to retract the conjoined muscles. We could see the  adherent scar and we incised this with cautery and then with a 15 blade,  delivered the biceps tendon, was encased in tenosynovitis. This was all  resected. We placed a #2 FiberLoop suture in a locking grasping fashion  starting at the muscle-tendon junction working our way proximally. Four  passes were made. Tendon proximal to this was excised and we tied a  mulberry knot with both ends of the suture exiting the tendon. We  identified our tenodesis site high in the bicipital groove adjacent to  the pectoralis muscle and we drilled our guidewire front to back and  made sure there was a rim of bone around it. We then reamed with a 5.0  reamer to accommodate a 4.75 screw. We loaded the interference screw on  a cannulated . A loop of #2 Arthrex suture was placed in the  . This allowed us to control the biceps tendon, so we delivered  the biceps tendon into the bone window and made sure there was adequate  tendon with a small [place] elevator and then, we delivered the  interference screw to secure it under good tension with the elbow  flexed. We tied a pair of sutures within the interference screw, pair of sutures  within the tendon. This was done in a pairwise fashion. We lifted the  tails short. We irrigated copiously and closed the wound in layers. We  used 2-0 Vicryl in interrupted inverted fashion to close the  subcutaneous tissue. 4-0 Monocryl closure and Prineo dressing to close  the biceps incision as well as the arthroscopic portals, anterolateral  and posterior.   We infiltrated 0.5% Marcaine in the biceps incision. Prineo dressing. Sterile compressive dressing was applied. The arm was  placed in a padded soft brace. The patient was repositioned in the  supine position before this, promptly awakened from anesthesia having  tolerated the procedure well, and was taken from the operating room to  the recovery room in satisfactory condition. PLAN:  The patient will be discharged on oral analgesics with  instructions to begin outpatient physical therapy and follow up in the  office in one week.         Latoya Palacio MD    D: 09/20/2022 11:48:56       T: 09/20/2022 13:40:14     [email or web address]  Job#: [id number]     Doc#: [id number]    CC:

## 2022-09-23 ENCOUNTER — HOSPITAL ENCOUNTER (OUTPATIENT)
Dept: PHYSICAL THERAPY | Age: 37
Setting detail: THERAPIES SERIES
Discharge: HOME | End: 2022-09-23
Payer: COMMERCIAL

## 2022-09-23 PROCEDURE — 97016 VASOPNEUMATIC DEVICE THERAPY: CPT

## 2022-09-23 PROCEDURE — 97161 PT EVAL LOW COMPLEX 20 MIN: CPT

## 2022-09-23 PROCEDURE — 97110 THERAPEUTIC EXERCISES: CPT

## 2022-09-23 PROCEDURE — 97140 MANUAL THERAPY 1/> REGIONS: CPT

## 2022-09-23 NOTE — PLAN OF CARE
[facility] and [address] [place] [address] 316, [address]  Phone: [phone]  Fax [phone]     Physical Therapy Certification    Dear Dr. Casie Woo ,    We had the pleasure of evaluating the following patient for physical therapy services at [address] [place] [address]. A summary of our findings can be found in the initial assessment below. This includes our plan of care. If you have any questions or concerns regarding these findings, please do not hesitate to contact me at the office phone number checked above. Thank you for the referral.       Physician Signature:_______________________________Date:__________________  By signing above (or electronic signature), therapists plan is approved by physician    Patient: Mark Benson   : 1985   MRN: [id number]  Referring Physician:        Evaluation Date: 2022      Medical Diagnosis Information:  Diagnosis: Q62.060Q (ICD-10-CM) - Superior labrum anterior-to-posterior (SLAP) tear of right shoulder   Treatment Diagnosis: M25. 611 R shoulder stiffness, M25.411 R shoulder effusion, M62.81 R shoulder weakness, M25.511 R shoulder pain                                         Insurance information: PT Insurance Information: [facility], 90 visits hard limit, no auth    Precautions/ Contra-indications: s/p R SLAP, bicep tenodesis on 22    C-SSRS Triggered by Intake questionnaire (Past 2 wk assessment):   [x] No, Questionnaire did not trigger screening.   [] Yes, Patient intake triggered further evaluation      [] C-SSRS Screening completed  [] PCP notified via Plan of Care  [] Emergency services notified     Latex Allergy:  [x]NO      []YES  Preferred Language for Healthcare:   [x]English       []other:    SUBJECTIVE: Patient stated complaint: Pt has been a previous patient of this clinic prior to surgery. Pt has had R shoulder pain for years after high school volleyball.  She went through injections, and PT. Getting progressively worse. Pt say in past year though got substantially worse and ended up seeing Dr. Ok Olguin who then did SLAP repair and bicep tenodesis on 9/20/22. Pt is right handed. At this point of she feels the R arm still feels numb and comes and goes. At this time pt is limited with personal hygiene, dressing, eating, sleeping (has to be in recliner position right now and only able to do a couple of hours at a time.        Relevant Medical History:DM  Functional Disability Index: FOTO shoulder score: 36/100 = 36% ability, 64% disability    Pain Scale: 5/10  Easing factors: rest, ice, pain medication  Provocative factors: any sort of movement to the R arm     Type: [x]Constant   []Intermittent  []Radiating [x]Localized []other:     Numbness/Tingling: pt feels her UE nerve block might still be wearing off and still leave tingling soemtimes    Occupation/School: [employer]  and taking off one week, computer work    Living Status/Prior Level of Function: Independent with ADLs and IADLs, volleyball    OBJECTIVE:     CERV ROM     Cervical Flexion     Cervical Extension     Cervical SB     Cervical rotation          ROM Left AROM Right PROM   Shoulder Flex 170 35 deg   Shoulder Abd 175 N/a   Shoulder ER 65 5 deg   Shoulder IR T4 N/a   Elbow flexion     Elbow extension          Strength  Left Right   Shoulder Flex 5/5 Not tested   Shoulder Scap 5/5 \"   Shoulder ER 5/5 \"   Shoulder IR 5/5 \"   Mid trap     Low trap     Rhomboids     Biceps     Triceps            Reflexes/Sensation:    [x]Dermatomes/Myotomes intact    []Reflexes equal and normal bilaterally   []Other:    Joint mobility: effusion and muscle guarding not allowing for accurate assessment today   []Normal    []Hypo   []Hyper    Palpation: generalized tenderness in the R shoulder    Functional Mobility/Transfers: needs min A for supine<> sit    Posture: forward shoulder, scapula protracted posture Right more so than L    Bandages/Dressings/Incisions: post-op large guaze was removed today, mild blood discharge present and dried. Clearn incision tape intact and no signs of infection. Gait: (include devices/WB status): WNL    Orthopedic Special Tests: n/a                       [x] Patient history, allergies, meds reviewed. Medical chart reviewed. See intake form. Review Of Systems (ROS):  [x]Performed Review of systems (Integumentary, CardioPulmonary, Neurological) by intake and observation. Intake form has been scanned into medical record. Patient has been instructed to contact their primary care physician regarding ROS issues if not already being addressed at this time.       Co-morbidities/Complexities (which will affect course of rehabilitation):   []None           Arthritic conditions   []Rheumatoid arthritis (M05.9)  []Osteoarthritis (M19.91)   Cardiovascular conditions   []Hypertension (I10)  []Hyperlipidemia (E78.5)  []Angina pectoris (I20)  []Atherosclerosis (I70)   Musculoskeletal conditions   []Disc pathology   []Congenital spine pathologies   []Prior surgical intervention  []Osteoporosis (M81.8)  []Osteopenia (M85.8)   Endocrine conditions   []Hypothyroid (E03.9)  []Hyperthyroid Gastrointestinal conditions   []Constipation (C37.90)   Metabolic conditions   []Morbid obesity (E66.01)  [x]Diabetes type 1(E10.65) or 2 (E11.65)   []Neuropathy (G60.9)     Pulmonary conditions   []Asthma (J45)  []Coughing   []COPD (J44.9)   Psychological Disorders  []Anxiety (F41.9)  []Depression (F32.9)   []Other:   []Other:          Barriers to/and or personal factors that will affect rehab potential:              []Age  []Sex              []Motivation/Lack of Motivation                        []Co-Morbidities              []Cognitive Function, education/learning barriers              []Environmental, home barriers              []profession/work barriers  []past PT/medical experience  []other:  Justification:      Falls Risk Assessment (30 days):   [x] Falls Risk assessed and no intervention required. [] Falls Risk assessed and Patient requires intervention due to being higher risk   TUG score (>12s at risk):     [] Falls education provided, including       ASSESSMENT:   Functional Impairments   []Noted spinal or UE joint hypomobility   []Noted spinal or UE joint hypermobility   [x]Decreased UE functional ROM   [x]Decreased UE functional strength   []Abnormal reflexes/sensation/myotomal/dermatomal deficits   [x]Decreased RC/scapular/core strength and neuromuscular control   []other:      Functional Activity Limitations (from functional questionnaire and intake)   []Reduced ability to tolerate prolonged functional positions   [x]Reduced ability or difficulty with changes of positions or transfers between positions   []Reduced ability to maintain good posture and demonstrate good body mechanics with sitting, bending, and lifting   [x] Reduced ability or tolerance with driving and/or computer work   [x]Reduced ability to sleep   [x]Reduced ability to perform lifting, reaching, carrying tasks   [x]Reduced ability to tolerate impact through UE   [x]Reduced ability to reach behind back   [x]Reduced ability to  or hold objects   [x]Reduced ability to throw or toss an object   []other:    Participation Restrictions   [x]Reduced participation in self care activities   [x]Reduced participation in home management activities   [x]Reduced participation in work activities   [x]Reduced participation in social activities. [x]Reduced participation in sport/recreation activities. Classification:   [x]Signs/symptoms consistent with post-surgical status including decreased ROM, strength and function.   []Signs/symptoms consistent with joint sprain/strain   []Signs/symptoms consistent with shoulder impingement   []Signs/symptoms consistent with shoulder/elbow/wrist tendinopathy   []Signs/symptoms consistent with Rotator cuff tear   []Signs/symptoms consistent with labral tear   []Signs/symptoms consistent with postural dysfunction    []Signs/symptoms consistent with Glenohumeral IR Deficit - <45 degrees   []Signs/symptoms consistent with facet dysfunction of cervical/thoracic spine    []Signs/symptoms consistent with pathology which may benefit from Dry needling     []other:     Prognosis/Rehab Potential:      []Excellent   [x]Good    []Fair   []Poor    Tolerance of evaluation/treatment:    []Excellent   [x]Good    []Fair   []Poor  Physical Therapy Evaluation Complexity Justification  [x] A history of present problem with:  [x] no personal factors and/or comorbidities that impact the plan of care;  []1-2 personal factors and/or comorbidities that impact the plan of care  []3 personal factors and/or comorbidities that impact the plan of care  [x] An examination of body systems using standardized tests and measures addressing any of the following: body structures and functions (impairments), activity limitations, and/or participation restrictions;:  [x] a total of 1-2 or more elements   [] a total of 3 or more elements   [] a total of 4 or more elements   [x] A clinical presentation with:  [x] stable and/or uncomplicated characteristics   [] evolving clinical presentation with changing characteristics  [] unstable and unpredictable characteristics;   [x] Clinical decision making of [x] low, [] moderate, [] high complexity using standardized patient assessment instrument and/or measurable assessment of functional outcome.     [x] EVAL (LOW) 97729 (typically 20 minutes face-to-face)  [] EVAL (MOD) 89509 (typically 30 minutes face-to-face)  [] EVAL (HIGH) 68544 (typically 45 minutes face-to-face)  [] RE-EVAL       PLAN:  Frequency/Duration:  2 days per week for 12 Weeks:  INTERVENTIONS:  [x] Therapeutic exercise including: strength training, ROM, for Upper extremity and core   [x]  NMR activation and proprioception for UE, scap and Core   [x] Manual therapy as indicated for shoulder, scapula and spine to include: Dry Needling/IASTM, STM, PROM, Gr I-IV mobilizations, manipulation. [x] Modalities as needed that may include: thermal agents, E-stim, Biofeedback, US, iontophoresis as indicated  [x] Patient education on joint protection, postural re-education, activity modification, progression of HEP. HEP instruction:   Access Code: [email or web address]  URL: [email or web address]  Date: 09/27/2022  Prepared by: Dora Soto     Exercises  Elbow Flexion PROM - 3 x daily - 7 x weekly - 1 sets - 10-20 reps  Seated Scapular Retraction - 3 x daily - 7 x weekly - 1 sets - 10-20 reps  Seated Shoulder Flexion Towel Slide at Table Top Full Range of Motion - 3 x daily - 7 x weekly - 1 sets - 10-20 reps  Standing Shoulder External Rotation AAROM with Dowel - 3 x daily - 7 x weekly - 1 sets - 10-20 reps    GOALS:  Patient stated goal: playing overhead volleyball  [] Progressing: [] Met: [] Not Met: [] Adjusted    Therapist goals for Patient:   Short Term Goals: To be achieved in: 2 weeks  1. Independent in HEP and progression per patient tolerance, in order to prevent re-injury. [] Progressing: [] Met: [] Not Met: [] Adjusted  2. Patient will have a decrease in pain to facilitate improvement in movement, function, and ADLs as indicated by Functional Deficits. [] Progressing: [] Met: [] Not Met: [] Adjusted    Long Term Goals: To be achieved in: 12 weeks  1. Disability index score of 10% or better for the FOTO shoulder score to assist with reaching prior level of function. [] Progressing: [] Met: [] Not Met: [] Adjusted  2. Patient will demonstrate increased AROM to WNL and symmetrical to uninvolved limb to allow for proper joint functioning as indicated by patients Functional Deficits. [] Progressing: [] Met: [] Not Met: [] Adjusted  3. Patient will demonstrate an increase in Strength to 4+/5 to allow for proper functional mobility as indicated by patients Functional Deficits. [] Progressing: [] Met: [] Not Met: [] Adjusted  4. Patient will be able to lift a 5lbs object overhead with normal scapulohumeral rhythm without increased symptoms or restriction. [] Progressing: [] Met: [] Not Met: [] Adjusted  5. Pt will be able to perform UE plyometric activity in [place] or [facility] without increased symptoms or restriction to assist her in returning to an active lifestyle and PLOF.    [] Progressing: [] Met: [] Not Met: [] Adjusted       Electronically signed by:  Errol Wang, PT, DPT, ATC, OCS [id number]

## 2022-09-27 NOTE — FLOWSHEET NOTE
[facility] [place] [address], [address], [address], [address]  Phone: [phone]  Fax [phone]        Date:  2022    Patient Name:  Staci Galarza    :  1985  MRN: [id number]  Restrictions/Precautions:    Medical/Treatment Diagnosis Information:  Diagnosis: P43.114U (ICD-10-CM) - Superior labrum anterior-to-posterior (SLAP) tear of right shoulder  Treatment Diagnosis: M25. 611 R shoulder stiffness, M25.411 R shoulder effusion, M62.81 R shoulder weakness, M25.511 R shoulder pain  Insurance/Certification information:  PT Insurance Information: [facility], 90 visits hard limit, no auth  Physician Information:   Dr. Edmundo Quezada  Has the plan of care been signed (Y/N):        []  Yes  [x]  No     Date of Patient follow up with Physician: 3 weeks      Is this a Progress Report:     []  Yes  [x]  No        If Yes:  Date Range for reporting period:  Beginning22  Ending    Progress report will be due (10 Rx or 30 days whichever is less):         Recertification will be due (POC Duration  / 90 days whichever is less): 22      Visit # Insurance Allowable Auth Required   In-person 1 90 visits (4 used already) []  Yes [x]  No    Telehealth - - []  Yes []  No    Total            Functional Scale: FOTO shoulder score: 36/100 = 36% ability, 64% disability  Date assessed:  22       Number of Comorbidities:  [x]0     []1-2    []3+    Latex Allergy:  [x]NO      []YES  Preferred Language for Healthcare:   [x]English       []other:      Pain level:  5/10     SUBJECTIVE:  See eval    OBJECTIVE: See eval  Observation:   Test measurements:      RESTRICTIONS/PRECAUTIONS: s/p R shoulder SLAP and bicep tenodesis 22    Exercises/Interventions:     Therapeutic Ex (58581) Sets/Reps/ sec Notes/CUES   Table slides: flex x5    Wand AAROM: ER x10    Scap squeeze x20    PROM elbow flex/ext x10                                       Pt edu: sling adjustment, tips for sleeping, icing, pain management 4 min    Manual Intervention ([id number])     PROM: flexion, ER, elbow flex/ext, pron/sup 10 min                             NMR re-education (78697)  CUES NEEDED                                                Therapeutic Activity (80087)                              Game Ready 15 min Min Compression         Therapeutic Exercise and NMR EXR  [x] (78146) Provided verbal/tactile cueing for activities related to strengthening, flexibility, endurance, ROM  for improvements in scapular, scapulothoracic and UE control with self care, reaching, carrying, lifting, house/yardwork, driving/computer work.    [] (18045) Provided verbal/tactile cueing for activities related to improving balance, coordination, kinesthetic sense, posture, motor skill, proprioception  to assist with  scapular, scapulothoracic and UE control with self care, reaching, carrying, lifting, house/yardwork, driving/computer work. Therapeutic Activities:    [] (68965 or 58538) Provided verbal/tactile cueing for activities related to improving balance, coordination, kinesthetic sense, posture, motor skill, proprioception and motor activation to allow for proper function of scapular, scapulothoracic and UE control with self care, carrying, lifting, driving/computer work. Home Exercise Program:    [x] (61600) Reviewed/Progressed HEP activities related to strengthening, flexibility, endurance, ROM of scapular, scapulothoracic and UE control with self care, reaching, carrying, lifting, house/yardwork, driving/computer work  [] (10541) Reviewed/Progressed HEP activities related to improving balance, coordination, kinesthetic sense, posture, motor skill, proprioception of scapular, scapulothoracic and UE control with self care, reaching, carrying, lifting, house/yardwork, driving/computer work      Access Code: [email or web address]  URL: [email or web address]  Date: 09/27/2022  Prepared by: Drew Bowman assist with reaching prior level of function. [] Progressing: [] Met: [] Not Met: [] Adjusted  2. Patient will demonstrate increased AROM to WNL and symmetrical to uninvolved limb to allow for proper joint functioning as indicated by patients Functional Deficits. [] Progressing: [] Met: [] Not Met: [] Adjusted  3. Patient will demonstrate an increase in Strength to 4+/5 to allow for proper functional mobility as indicated by patients Functional Deficits. [] Progressing: [] Met: [] Not Met: [] Adjusted  4. Patient will be able to lift a 5lbs object overhead with normal scapulohumeral rhythm without increased symptoms or restriction. [] Progressing: [] Met: [] Not Met: [] Adjusted  5. Pt will be able to perform UE plyometric activity in [place] or CKC without increased symptoms or restriction to assist her in returning to an active lifestyle and PLOF. [] Progressing: [] Met: [] Not Met: [] Adjusted     Progression Towards Functional goals:  [] Patient is progressing as expected towards functional goals listed. [] Progression is slowed due to complexities listed. [] Progression has been slowed due to co-morbidities. [x] Plan just implemented, too soon to assess goals progression  [] Other:     ASSESSMENT:  See eval    Overall Progression Towards Functional goals/ Treatment Progress Update:  [] Patient is progressing as expected towards functional goals listed. [] Progression is slowed due to complexities/Impairments listed. [] Progression has been slowed due to co-morbidities.   [x] Plan just implemented, too soon to assess goals progression <30days   [] Goals require adjustment due to lack of progress  [] Patient is not progressing as expected and requires additional follow up with physician  [] Other    Prognosis for POC: [x] Good [] Fair  [] Poor      Patient requires continued skilled intervention: [x] Yes  [] No    Treatment/Activity Tolerance:  [x] Patient able to complete treatment  [] Patient limited by fatigue  [] Patient limited by pain    [] Patient limited by other medical complications  [] Other:         Return to Play: (if applicable)   []  Stage 1: Intro to Strength   []  Stage 2: Return to Run and Strength   []  Stage 3: Return to Jump and Strength   []  Stage 4: Dynamic Strength and Agility   []  Stage 5: Sport Specific Training     []  Ready to Return to Play, Meets All Above Stages   []  Not Ready for Return to Sports   Comments:                               PLAN: See eval. Pt to be seen 2 times a week for 12 weeks. [] Continue per plan of care [] Alter current plan (see comments above)  [x] Plan of care initiated [] Hold pending MD visit [] Discharge      Electronically signed by:  Luis Lino PT    Note: If patient does not return for scheduled/ recommended follow up visits, this note will serve as a discharge from care along with most recent update on progress.

## 2022-09-28 ENCOUNTER — HOSPITAL ENCOUNTER (OUTPATIENT)
Dept: PHYSICAL THERAPY | Age: 37
Setting detail: THERAPIES SERIES
Discharge: HOME | End: 2022-09-28
Payer: COMMERCIAL

## 2022-09-28 PROCEDURE — 97140 MANUAL THERAPY 1/> REGIONS: CPT | Performed by: PHYSICAL THERAPIST

## 2022-09-28 PROCEDURE — 97110 THERAPEUTIC EXERCISES: CPT | Performed by: PHYSICAL THERAPIST

## 2022-09-28 PROCEDURE — 97016 VASOPNEUMATIC DEVICE THERAPY: CPT | Performed by: PHYSICAL THERAPIST

## 2022-09-28 NOTE — FLOWSHEET NOTE
Notes/CUES   Table slides: flex, abd [id number]    Wand AAROM: ER x10    Scap squeeze x20    PROM elbow flex/ext [id number]    Shrugs  x20                                  Pt edu: sling adjustment, tips for sleeping, icing, pain management     Manual Intervention ([id number])     PROM: flexion, ER, abduction 10 min                             NMR re-education (57170)  CUES NEEDED                                                Therapeutic Activity (14863)                              Game Ready 10 min Min Compression         Therapeutic Exercise and NMR EXR  [x] (37891) Provided verbal/tactile cueing for activities related to strengthening, flexibility, endurance, ROM  for improvements in scapular, scapulothoracic and UE control with self care, reaching, carrying, lifting, house/yardwork, driving/computer work.    [] (23461) Provided verbal/tactile cueing for activities related to improving balance, coordination, kinesthetic sense, posture, motor skill, proprioception  to assist with  scapular, scapulothoracic and UE control with self care, reaching, carrying, lifting, house/yardwork, driving/computer work. Therapeutic Activities:    [] (29613 or 72532) Provided verbal/tactile cueing for activities related to improving balance, coordination, kinesthetic sense, posture, motor skill, proprioception and motor activation to allow for proper function of scapular, scapulothoracic and UE control with self care, carrying, lifting, driving/computer work.      Home Exercise Program:    [x] (36566) Reviewed/Progressed HEP activities related to strengthening, flexibility, endurance, ROM of scapular, scapulothoracic and UE control with self care, reaching, carrying, lifting, house/yardwork, driving/computer work  [] (76561) Reviewed/Progressed HEP activities related to improving balance, coordination, kinesthetic sense, posture, motor skill, proprioception of scapular, scapulothoracic and UE control with self care, reaching, carrying, lifting, house/yardwork, driving/computer work      Access Code: [email or web address]  URL: [email or web address]  Date: 09/27/2022  Prepared by: Gail Duran    Exercises  Elbow Flexion PROM - 3 x daily - 7 x weekly - 1 sets - 10-20 reps  Seated Scapular Retraction - 3 x daily - 7 x weekly - 1 sets - 10-20 reps  Seated Shoulder Flexion Towel Slide at Table Top Full Range of Motion - 3 x daily - 7 x weekly - 1 sets - 10-20 reps  Standing Shoulder External Rotation AAROM with Dowel - 3 x daily - 7 x weekly - 1 sets - 10-20 reps  Added shrugs and abduction table slides 9/28/22    Manual Treatments:  PROM / STM / Oscillations-Mobs:  G-I, II, III, IV (PA's, Inf., Post.)  [x] (87798) Provided manual therapy to mobilize soft tissue/joints of cervical/CT, scapular GHJ and UE for the purpose of modulating pain, promoting relaxation,  increasing ROM, reducing/eliminating soft tissue swelling/inflammation/restriction, improving soft tissue extensibility and allowing for proper ROM for normal function with self care, reaching, carrying, lifting, house/yardwork, driving/computer work    Modalities: Game Ready to address inflammation and pain x 10 min    Charges  Timed Code Treatment Minutes: 35   Total Treatment Minutes: 50     [] EVAL (LOW) 67933   [] EVAL (MOD) 41431   [] EVAL (HIGH) 56355   [] RE-EVAL     [x] ZA(81915) x  1   [] IONTO  [] NMR (14506) x     [x] VASO  [x] Manual (47062) x  1    [] Other:  [] TA x      [] Mech Traction (28272)  [] ES(attended) (19926)      [] ES (un) (69439):     GOALS:  Patient stated goal: playing overhead volleyball  [] Progressing: [] Met: [] Not Met: [] Adjusted    Therapist goals for Patient:   Short Term Goals: To be achieved in: 2 weeks  1. Independent in HEP and progression per patient tolerance, in order to prevent re-injury. [] Progressing: [] Met: [] Not Met: [] Adjusted  2.  Patient will have a decrease in pain to facilitate improvement in movement, function, and ADLs as indicated by Functional Deficits. [] Progressing: [] Met: [] Not Met: [] Adjusted    Long Term Goals: To be achieved in: 12 weeks  1. Disability index score of 10% or better for the FOTO shoulder score to assist with reaching prior level of function. [] Progressing: [] Met: [] Not Met: [] Adjusted  2. Patient will demonstrate increased AROM to WNL and symmetrical to uninvolved limb to allow for proper joint functioning as indicated by patients Functional Deficits. [] Progressing: [] Met: [] Not Met: [] Adjusted  3. Patient will demonstrate an increase in Strength to 4+/5 to allow for proper functional mobility as indicated by patients Functional Deficits. [] Progressing: [] Met: [] Not Met: [] Adjusted  4. Patient will be able to lift a 5lbs object overhead with normal scapulohumeral rhythm without increased symptoms or restriction. [] Progressing: [] Met: [] Not Met: [] Adjusted  5. Pt will be able to perform UE plyometric activity in [place] or CKC without increased symptoms or restriction to assist her in returning to an active lifestyle and PLOF. [] Progressing: [] Met: [] Not Met: [] Adjusted     Progression Towards Functional goals:  [] Patient is progressing as expected towards functional goals listed. [] Progression is slowed due to complexities listed. [] Progression has been slowed due to co-morbidities. [x] Plan just implemented, too soon to assess goals progression  [] Other:     ASSESSMENT:  Pt has good PROM today. She has a good understanding of the exercises. Fair posture due to rounded shoulders/guarding. Overall Progression Towards Functional goals/ Treatment Progress Update:  [] Patient is progressing as expected towards functional goals listed. [] Progression is slowed due to complexities/Impairments listed. [] Progression has been slowed due to co-morbidities.   [x] Plan just implemented, too soon to assess goals progression <30days   [] Goals require adjustment due to lack of progress  [] Patient is not progressing as expected and requires additional follow up with physician  [] Other    Prognosis for POC: [x] Good [] Fair  [] Poor      Patient requires continued skilled intervention: [x] Yes  [] No    Treatment/Activity Tolerance:  [x] Patient able to complete treatment  [] Patient limited by fatigue  [] Patient limited by pain    [] Patient limited by other medical complications  [] Other:         Return to Play: (if applicable)   []  Stage 1: Intro to Strength   []  Stage 2: Return to Run and Strength   []  Stage 3: Return to Jump and Strength   []  Stage 4: Dynamic Strength and Agility   []  Stage 5: Sport Specific Training     []  Ready to Return to Play, Meets All Above Stages   []  Not Ready for Return to Sports   Comments:                               PLAN: See eval. Pt to be seen 2 times a week for 12 weeks. [] Continue per plan of care [] Alter current plan (see comments above)  [x] Plan of care initiated [] Hold pending MD visit [] Discharge      Electronically signed by:  Shawanda Bravo PT    Note: If patient does not return for scheduled/ recommended follow up visits, this note will serve as a discharge from care along with most recent update on progress.

## 2022-09-29 ENCOUNTER — OFFICE VISIT (OUTPATIENT)
Dept: ORTHOPEDIC SURGERY | Age: 37
End: 2022-09-29

## 2022-09-29 VITALS — BODY MASS INDEX: 23.7 KG/M2 | HEIGHT: 67 IN | WEIGHT: 151 LBS

## 2022-09-29 DIAGNOSIS — Z98.890 S/P SHOULDER SURGERY: ICD-10-CM

## 2022-09-29 DIAGNOSIS — M25.511 RIGHT SHOULDER PAIN, UNSPECIFIED CHRONICITY: Primary | ICD-10-CM

## 2022-09-29 PROCEDURE — 99024 POSTOP FOLLOW-UP VISIT: CPT | Performed by: ORTHOPAEDIC SURGERY

## 2022-09-29 RX ORDER — BLOOD SUGAR DIAGNOSTIC
STRIP MISCELLANEOUS
COMMUNITY
Start: 2022-06-22

## 2022-09-29 NOTE — LETTER
Physical Therapy Rehabilitation Referral    Patient Name:  Marleni Lea      YOB: 1985    Diagnosis:    1. Right shoulder pain, unspecified chronicity    2. S/P shoulder surgery    3. arthroscopic extensive debridement of subacromial bursa,  bursal-sided adhesions as well as superior labrum and rotator interval  scar; open subpectoral biceps tenodesis 9/20/2022    Precautions:     [x] Evaluate and Treat    Post Op Instructions:  [] Continuous passive motion (CPM)  [x] Passive Elbow ROM  [x] Exercise in plane of scapula  []  Strengthening     [x] Pulley and instruction   [x] Home exercise program (copy to patient)   [x] Sling when arm at risk  [] Sling or brace at all times   [x] AAROM: Forward elevation to  140            [x] AAROM: External rotation  To  40    [] Isometric external rotator strengthening [] AAROM: internal rotation: up the back  [x] Isometric abductor strengthening  [] AAROM: Internal abduction   [] Isometric internal rotator strengthening [] AAROM: cross-body adduction             Stretching:     Strengthening:  [] Four quadrant (FE, ER, IR, CBA)  [] Rotator cuff (ER, IR, Abd)  [x] Forward Elevation    [] External Rotators     [x] External Rotation    [] Internal Rotators  [] Internal Rotation: up/back   [] Abductors     [] Internal Rotation: supine in abduction  [] Sleeper Stretch    [] Flexors  [] Cross-body abduction    [] Extensors  [x] Pendulum (FE, Abd/Add, cw/ccw)  [x] Scapular Stabilizers   [x] Wall-walking (FE, Abd)        [x] Shoulder shrugs     [x] Table slides (FE)                [x] Rhomboid pinch  [] Elbow (flex, ext, pron, sup)        [] Lat.  Pull downs     [] Medial epicondylitis program       [] Forward punch   [] Lateral epicondylitis program       [] Internal rotators     [] Progressive resistive exercises  [] Bench Press        [] Bench press plus  Activities:     [] Lateral pull-downs  [] Rowing     [] Progressive two-hand supine press  [] Stepper/Exercise bike   [] Biceps: curls/supination  [] Swimming  [] Water exercises    Modalities:     Return to Sport:  [x] Of Choice      [] Plyometrics  [] Ultrasound     [] Rhythmic stabilization  [] Iontophoresis    [] Core strengthening   [] Moist heat     [] Sports specific program:   [] Massage         [x] Cryotherapy      [] Electrical stimulation     [] Paraffin  [] Whirlpool  [] TENS    [x] Home exercise program (copy to patient). Perform exercises for:   15     minutes    3      times/day  [x] Supervised physical therapy  Frequency: []  1x week  [x] 2x week  [] 3x week  [] Other:   Duration: [] 2 weeks   [] 4 weeks  [x] 6 weeks  [] Other:     Additional Instructions:     Ivette Subramanian MD, PhD

## 2022-09-29 NOTE — PROGRESS NOTES
extensive debridement of subacromial bursa, bursal-sided adhesions as well as superior labrum and rotator interval scar; open subpectoral biceps tenodesis on 9/20/2022. She is at risk of stiffness because her preop external rotation was limited. Impression:  Encounter Diagnoses   Name Primary? Right shoulder pain, unspecified chronicity Yes    S/P shoulder surgery        Office Procedures:  Orders Placed This Encounter   Procedures    [facility]     Referral Priority:   Routine     Referral Type:   Eval and Treat     Referral Reason:   Specialty Services Required     Requested Specialty:   Physical Therapist     Number of Visits Requested:   1         Treatment Plan:    Overall, Cristy Watts is doing well. Her shoulder pain is well-controlled. We recommend that she discontinue the sling at home and wear it mainly outdoors in crowds. She may resume driving. We will give a print out of their physical therapy order. All of her questions were fully answered today. We would like to see her back in 2 weeks for follow-up visit. 10:42 AM      Garrett Beckett PA-C  Orthopaedic Sports Medicine Physician Assistant    During this examination, Garrett MARCELO PA-C, functioned as a scribe for Dr. Ron Osgood. This dictation was performed with a verbal recognition program (DRAGON) and it was checked for errors. It is possible that there are still dictated errors within this office note. If so, please bring any errors to my attention for an addendum. All efforts were made to ensure that this office note is accurate.  ____________________  I, Dr. Ron Osgood, personally performed the services described in this documentation as described by Garrett Beckett PA-C in my presence, and it is both accurate and complete. Ivette Kiser MD, PhD  9/29/2022

## 2022-09-30 ENCOUNTER — HOSPITAL ENCOUNTER (OUTPATIENT)
Dept: PHYSICAL THERAPY | Age: 37
Setting detail: THERAPIES SERIES
Discharge: HOME | End: 2022-09-30
Payer: COMMERCIAL

## 2022-09-30 PROCEDURE — 97140 MANUAL THERAPY 1/> REGIONS: CPT

## 2022-09-30 PROCEDURE — 97016 VASOPNEUMATIC DEVICE THERAPY: CPT

## 2022-10-03 ENCOUNTER — HOSPITAL ENCOUNTER (OUTPATIENT)
Dept: PHYSICAL THERAPY | Age: 37
Setting detail: THERAPIES SERIES
Discharge: HOME | End: 2022-10-03
Payer: COMMERCIAL

## 2022-10-03 PROCEDURE — 97016 VASOPNEUMATIC DEVICE THERAPY: CPT | Performed by: PHYSICAL THERAPIST

## 2022-10-03 PROCEDURE — 97110 THERAPEUTIC EXERCISES: CPT | Performed by: PHYSICAL THERAPIST

## 2022-10-03 PROCEDURE — 97140 MANUAL THERAPY 1/> REGIONS: CPT | Performed by: PHYSICAL THERAPIST

## 2022-10-03 NOTE — FLOWSHEET NOTE
The [address] [place] [address],[facility] [address], [facility] [address], [address]  Phone: [phone]  Fax [phone]        Date:  10/3/2022    Patient Name:  Tamiko Alvarado    :  1985  MRN: [id number]  Restrictions/Precautions:    Medical/Treatment Diagnosis Information:  Diagnosis: W78.083E (ICD-10-CM) - Superior labrum anterior-to-posterior (SLAP) tear of right shoulder  Treatment Diagnosis: M25. 611 R shoulder stiffness, M25.411 R shoulder effusion, M62.81 R shoulder weakness, M25.511 R shoulder pain  Insurance/Certification information:  PT Insurance Information: [facility], 90 visits hard limit, no auth  Physician Information:   Dr. Katheryn Monteiro  Has the plan of care been signed (Y/N):        []  Yes  [x]  No     Date of Patient follow up with Physician:        Is this a Progress Report:     []  Yes  [x]  No        If Yes:  Date Range for reporting period:  Beginnin22  Ending    Progress report will be due (10 Rx or 30 days whichever is less):         Recertification will be due (POC Duration  / 90 days whichever is less): 22      Visit # Insurance Allowable Auth Required   In-person 4 90 visits (4 used already) []  Yes [x]  No    Telehealth - - []  Yes []  No    Total            Functional Scale: FOTO shoulder score: 36/100 = 36% ability, 64% disability  Date assessed:  22       Number of Comorbidities:  [x]0     []1-2    []3+    Latex Allergy:  [x]NO      []YES  Preferred Language for Healthcare:   [x]English       []other:      Pain level:   Ibuprofen and Tylenol    SUBJECTIVE:  2 weeks post-op. Pt is doing well. She saw the MD and she can progress with ROM.      OBJECTIVE:   Observation: rounded shoulders  Test measurements:    PROM:  Flexion= 100 degrees  Abduction= 70 degrees  ER= 30 degrees scaption    RESTRICTIONS/PRECAUTIONS: s/p R shoulder SLAP and bicep tenodesis 22    Exercises/Interventions:     Therapeutic Ex (10890) Sets/Reps/ sec Notes/CUES   Table slides: flex, abd [id number]    Wand AAROM: ER 45 degrees [id number] supine   Scap squeeze    PROM elbow flex/ext    Shrugs     Pedulums    ER isometric [id number]    IR isometric [id number]    Abduction isometric [id number]              Pt edu: D/C sling but still no reaching or lifting, use of arm rest for return to work next week, icing. Manual Intervention ([address] [place] [address])     PROM: flexion, ER, abduction 15 min    Jt Mobs: posterior: grad II-III                        NMR re-education (37253)  CUES NEEDED                                                Therapeutic Activity (60569)                              Game Ready 15 min Min Compression     Patient Education:  Access Code: [email or web address]  URL: [email or web address]  Date: 10/03/2022  Prepared by: Carmina Hernandez    Exercises  Seated Scapular Retraction - 2 x daily - 7 x weekly - 3 sets - 10 reps  Seated Shoulder Flexion Slide at Table Top with Forearm in Neutral (Mirrored) - 2 x daily - 7 x weekly - 1 sets - 10 reps - 10 sec hold  Seated Shoulder Abduction Towel Slide at Table Top with Forearm in Neutral (Mirrored) - 2 x daily - 7 x weekly - 1 sets - 10 reps - 10 sec hold  Standing Isometric Shoulder Internal Rotation at Doorway - 2 x daily - 7 x weekly - 1 sets - 10 reps - 10 sec hold  Standing Isometric Shoulder External Rotation with Doorway (Mirrored) - 2 x daily - 7 x weekly - 1 sets - 10 reps - 10 sec hold  Isometric Shoulder Abduction - Arm Straight at Wall (Mirrored) - 2 x daily - 7 x weekly - 1 sets - 10 reps - 10 sec hold  Supine Shoulder External Rotation in 45 Degrees Abduction AAROM with Dowel (Mirrored) - 2 x daily - 7 x weekly - 1 sets - 10 reps - 10 sec hold      Therapeutic Exercise and NMR EXR  [x] (80802) Provided verbal/tactile cueing for activities related to strengthening, flexibility, endurance, ROM  for improvements in scapular, scapulothoracic and UE control with self care, reaching, carrying, lifting, house/yardwork, driving/computer work.    [] (37198) Provided verbal/tactile cueing for activities related to improving balance, coordination, kinesthetic sense, posture, motor skill, proprioception  to assist with  scapular, scapulothoracic and UE control with self care, reaching, carrying, lifting, house/yardwork, driving/computer work. Therapeutic Activities:    [] (59507 or 06138) Provided verbal/tactile cueing for activities related to improving balance, coordination, kinesthetic sense, posture, motor skill, proprioception and motor activation to allow for proper function of scapular, scapulothoracic and UE control with self care, carrying, lifting, driving/computer work.      Home Exercise Program:    [x] (89739) Reviewed/Progressed HEP activities related to strengthening, flexibility, endurance, ROM of scapular, scapulothoracic and UE control with self care, reaching, carrying, lifting, house/yardwork, driving/computer work  [] (93377) Reviewed/Progressed HEP activities related to improving balance, coordination, kinesthetic sense, posture, motor skill, proprioception of scapular, scapulothoracic and UE control with self care, reaching, carrying, lifting, house/yardwork, driving/computer work      Manual Treatments:  PROM / STM / Oscillations-Mobs:  G-I, II, III, IV (PA's, Inf., Post.)  [x] (59846) Provided manual therapy to mobilize soft tissue/joints of cervical/CT, scapular GHJ and UE for the purpose of modulating pain, promoting relaxation,  increasing ROM, reducing/eliminating soft tissue swelling/inflammation/restriction, improving soft tissue extensibility and allowing for proper ROM for normal function with self care, reaching, carrying, lifting, house/yardwork, driving/computer work    Modalities: Game Ready to address inflammation and pain x 15 min    Charges  Timed Code Treatment Minutes: 40   Total Treatment Minutes: 60     [] EVAL (LOW) 61503   [] EVAL (MOD) 34950   [] EVAL (HIGH) 01796   [] RE-EVAL     [x] JA(82369) x 2    [] IONTO  [] NMR (96505) x     [x] VASO  [x] Manual (05305) x 1   [] Other:  [] TA x      [] Mech Traction (19155)  [] ES(attended) (27869)      [] ES (un) (42358):     GOALS:  Patient stated goal: playing overhead volleyball  [] Progressing: [] Met: [] Not Met: [] Adjusted    Therapist goals for Patient:   Short Term Goals: To be achieved in: 2 weeks  1. Independent in HEP and progression per patient tolerance, in order to prevent re-injury. [] Progressing: [] Met: [] Not Met: [] Adjusted  2. Patient will have a decrease in pain to facilitate improvement in movement, function, and ADLs as indicated by Functional Deficits. [] Progressing: [] Met: [] Not Met: [] Adjusted    Long Term Goals: To be achieved in: 12 weeks  1. Disability index score of 10% or better for the FOTO shoulder score to assist with reaching prior level of function. [] Progressing: [] Met: [] Not Met: [] Adjusted  2. Patient will demonstrate increased AROM to WNL and symmetrical to uninvolved limb to allow for proper joint functioning as indicated by patients Functional Deficits. [] Progressing: [] Met: [] Not Met: [] Adjusted  3. Patient will demonstrate an increase in Strength to 4+/5 to allow for proper functional mobility as indicated by patients Functional Deficits. [] Progressing: [] Met: [] Not Met: [] Adjusted  4. Patient will be able to lift a 5lbs object overhead with normal scapulohumeral rhythm without increased symptoms or restriction. [] Progressing: [] Met: [] Not Met: [] Adjusted  5. Pt will be able to perform UE plyometric activity in [place] or CKC without increased symptoms or restriction to assist her in returning to an active lifestyle and PLOF. [] Progressing: [] Met: [] Not Met: [] Adjusted     Progression Towards Functional goals:  [] Patient is progressing as expected towards functional goals listed. [] Progression is slowed due to complexities listed.   [] Progression has been slowed due to co-morbidities. [x] Plan just implemented, too soon to assess goals progression  [] Other:     ASSESSMENT:  Pt is progressing well. Improved PROM today. Overall Progression Towards Functional goals/ Treatment Progress Update:  [] Patient is progressing as expected towards functional goals listed. [] Progression is slowed due to complexities/Impairments listed. [] Progression has been slowed due to co-morbidities. [x] Plan just implemented, too soon to assess goals progression <30days   [] Goals require adjustment due to lack of progress  [] Patient is not progressing as expected and requires additional follow up with physician  [] Other    Prognosis for POC: [x] Good [] Fair  [] Poor      Patient requires continued skilled intervention: [x] Yes  [] No    Treatment/Activity Tolerance:  [x] Patient able to complete treatment  [x] Patient limited by fatigue  [x] Patient limited by pain    [] Patient limited by other medical complications  [] Other:     Return to Play: (if applicable)   []  Stage 1: Intro to Strength   []  Stage 2: Return to Run and Strength   []  Stage 3: Return to Jump and Strength   []  Stage 4: Dynamic Strength and Agility   []  Stage 5: Sport Specific Training     []  Ready to Return to Play, Meets All Above Stages   []  Not Ready for Return to Sports   Comments:              PLAN: See eval. Pt to be seen 2 times a week for 12 weeks. [x] Continue per plan of care [] Alter current plan (see comments above)  [] Plan of care initiated [] Hold pending MD visit [] Discharge      Electronically signed by:  Jasiel Su, PT    Note: If patient does not return for scheduled/ recommended follow up visits, this note will serve as a discharge from care along with most recent update on progress.

## 2022-10-07 ENCOUNTER — HOSPITAL ENCOUNTER (OUTPATIENT)
Dept: PHYSICAL THERAPY | Age: 37
Setting detail: THERAPIES SERIES
Discharge: HOME | End: 2022-10-07
Payer: COMMERCIAL

## 2022-10-07 PROCEDURE — 97110 THERAPEUTIC EXERCISES: CPT

## 2022-10-07 PROCEDURE — 97016 VASOPNEUMATIC DEVICE THERAPY: CPT

## 2022-10-07 PROCEDURE — 97140 MANUAL THERAPY 1/> REGIONS: CPT

## 2022-10-07 NOTE — FLOWSHEET NOTE
The [place] [address], [address], [facility] [address], [address]  Phone: [phone]  Fax [phone]        Date:  10/7/2022    Patient Name:  Evette Olguin    :  1985  MRN: [id number]  Restrictions/Precautions:    Medical/Treatment Diagnosis Information:  Diagnosis: F17.258D (ICD-10-CM) - Superior labrum anterior-to-posterior (SLAP) tear of right shoulder  Treatment Diagnosis: M25. 611 R shoulder stiffness, M25.411 R shoulder effusion, M62.81 R shoulder weakness, M25.511 R shoulder pain  Insurance/Certification information:  PT Insurance Information: [facility], 90 visits hard limit, no auth  Physician Information:   Dr. Med Umaña  Has the plan of care been signed (Y/N):        []  Yes  [x]  No     Date of Patient follow up with Physician:        Is this a Progress Report:     []  Yes  [x]  No        If Yes:  Date Range for reporting period:  Beginnin22  Ending    Progress report will be due (10 Rx or 30 days whichever is less):         Recertification will be due (POC Duration  / 90 days whichever is less): 22      Visit # Insurance Allowable Auth Required   In-person 4 90 visits (4 used already) []  Yes [x]  No    Telehealth - - []  Yes []  No    Total            Functional Scale: FOTO shoulder score: 36/100 = 36% ability, 64% disability  Date assessed:  22       Number of Comorbidities:  [x]0     []1-2    []3+    Latex Allergy:  [x]NO      []YES  Preferred Language for Healthcare:   [x]English       []other:      Pain level:  2/10 No meds    SUBJECTIVE:   Pt says she was very sore after 1st day back to work which surprised her but after icing that evening and ibuprofen before bed she woke up this morning feeling pretty good.      OBJECTIVE:   Observation: rounded shoulders  Test measurements:       ROM Left AROM Right PROM Right PROM Right  10/7/22   Shoulder Flex 170 35 deg 100 115   Shoulder Abd 175 N/a 70 100 Shoulder ER 65 5 deg 30 46   Shoulder IR T4 N/a         RESTRICTIONS/PRECAUTIONS: s/p R shoulder SLAP and bicep tenodesis 9/20/22    Exercises/Interventions:     Therapeutic Ex (77247) Sets/Reps/ sec Notes/CUES   Table slides: flex, abd    Wand AAROM: ER 45 degrees supine   Scap squeeze x20    PROM elbow flex/ext    Shrugs     Pedulums X20 ea direction    ER isometric    IR isometric    Abduction isometric    Pulleys: flex, abd X10 ea         Pt edu: D/C sling but still no reaching or lifting, use of arm rest for return to work next week, icing. Manual Intervention (89740)     PROM: flexion, ER, abduction 10 min    Jt Mobs: posterior: grad II-III 5 min                        NMR re-education (77758)  CUES NEEDED                                                Therapeutic Activity (78594)                              Game Ready 15 min Min Compression     Patient Education:  Access Code: [email or web address]  URL: [email or web address]  Date: 10/03/2022  Prepared by: Georgette Brooks    Exercises  Seated Scapular Retraction - 2 x daily - 7 x weekly - 3 sets - 10 reps  Seated Shoulder Flexion Slide at Table Top with Forearm in Neutral (Mirrored) - 2 x daily - 7 x weekly - 1 sets - 10 reps - 10 sec hold  Seated Shoulder Abduction Towel Slide at Table Top with Forearm in Neutral (Mirrored) - 2 x daily - 7 x weekly - 1 sets - 10 reps - 10 sec hold  Standing Isometric Shoulder Internal Rotation at Doorway - 2 x daily - 7 x weekly - 1 sets - 10 reps - 10 sec hold  Standing Isometric Shoulder External Rotation with Doorway (Mirrored) - 2 x daily - 7 x weekly - 1 sets - 10 reps - 10 sec hold  Isometric Shoulder Abduction - Arm Straight at Wall (Mirrored) - 2 x daily - 7 x weekly - 1 sets - 10 reps - 10 sec hold  Supine Shoulder External Rotation in 45 Degrees Abduction AAROM with Dowel (Mirrored) - 2 x daily - 7 x weekly - 1 sets - 10 reps - 10 sec hold      Therapeutic Exercise and NMR EXR  [x] (41576) Provided verbal/tactile cueing for activities related to strengthening, flexibility, endurance, ROM  for improvements in scapular, scapulothoracic and UE control with self care, reaching, carrying, lifting, house/yardwork, driving/computer work.    [] (92899) Provided verbal/tactile cueing for activities related to improving balance, coordination, kinesthetic sense, posture, motor skill, proprioception  to assist with  scapular, scapulothoracic and UE control with self care, reaching, carrying, lifting, house/yardwork, driving/computer work. Therapeutic Activities:    [] (82952 or 33921) Provided verbal/tactile cueing for activities related to improving balance, coordination, kinesthetic sense, posture, motor skill, proprioception and motor activation to allow for proper function of scapular, scapulothoracic and UE control with self care, carrying, lifting, driving/computer work.      Home Exercise Program:    [] (35956) Reviewed/Progressed HEP activities related to strengthening, flexibility, endurance, ROM of scapular, scapulothoracic and UE control with self care, reaching, carrying, lifting, house/yardwork, driving/computer work  [] (53239) Reviewed/Progressed HEP activities related to improving balance, coordination, kinesthetic sense, posture, motor skill, proprioception of scapular, scapulothoracic and UE control with self care, reaching, carrying, lifting, house/yardwork, driving/computer work      Manual Treatments:  PROM / STM / Oscillations-Mobs:  G-I, II, III, IV (PA's, Inf., Post.)  [x] (23089) Provided manual therapy to mobilize soft tissue/joints of cervical/CT, scapular GHJ and UE for the purpose of modulating pain, promoting relaxation,  increasing ROM, reducing/eliminating soft tissue swelling/inflammation/restriction, improving soft tissue extensibility and allowing for proper ROM for normal function with self care, reaching, carrying, lifting, house/yardwork, driving/computer work    Modalities: Game Ready to address inflammation and pain x 15 min    Charges  Timed Code Treatment Minutes: 30   Total Treatment Minutes: 45     [] EVAL (LOW) 67604   [] EVAL (MOD) 18965   [] EVAL (HIGH) 66601   [] RE-EVAL     [x] ID(54047) x 1    [] IONTO  [] NMR (16632) x     [x] VASO  [x] Manual (00308) x  1 [] Other:  [] TA x      [] Mech Traction (23853)  [] ES(attended) (82311)      [] ES (un) (25148):     GOALS:  Patient stated goal: playing overhead volleyball  [] Progressing: [] Met: [] Not Met: [] Adjusted    Therapist goals for Patient:   Short Term Goals: To be achieved in: 2 weeks  1. Independent in HEP and progression per patient tolerance, in order to prevent re-injury. [] Progressing: [] Met: [] Not Met: [] Adjusted  2. Patient will have a decrease in pain to facilitate improvement in movement, function, and ADLs as indicated by Functional Deficits. [] Progressing: [] Met: [] Not Met: [] Adjusted    Long Term Goals: To be achieved in: 12 weeks  1. Disability index score of 10% or better for the FOTO shoulder score to assist with reaching prior level of function. [] Progressing: [] Met: [] Not Met: [] Adjusted  2. Patient will demonstrate increased AROM to WNL and symmetrical to uninvolved limb to allow for proper joint functioning as indicated by patients Functional Deficits. [] Progressing: [] Met: [] Not Met: [] Adjusted  3. Patient will demonstrate an increase in Strength to 4+/5 to allow for proper functional mobility as indicated by patients Functional Deficits. [] Progressing: [] Met: [] Not Met: [] Adjusted  4. Patient will be able to lift a 5lbs object overhead with normal scapulohumeral rhythm without increased symptoms or restriction. [] Progressing: [] Met: [] Not Met: [] Adjusted  5. Pt will be able to perform UE plyometric activity in [place] or CKC without increased symptoms or restriction to assist her in returning to an active lifestyle and PLOF.    [] Progressing: [] Met: [] Not Met: [] Adjusted     Progression Towards Functional goals:  [] Patient is progressing as expected towards functional goals listed. [] Progression is slowed due to complexities listed. [] Progression has been slowed due to co-morbidities. [x] Plan just implemented, too soon to assess goals progression  [] Other:     ASSESSMENT:  Pt continues to show progress in her PROM. Incisions healing well. Overall Progression Towards Functional goals/ Treatment Progress Update:  [] Patient is progressing as expected towards functional goals listed. [] Progression is slowed due to complexities/Impairments listed. [] Progression has been slowed due to co-morbidities. [x] Plan just implemented, too soon to assess goals progression <30days   [] Goals require adjustment due to lack of progress  [] Patient is not progressing as expected and requires additional follow up with physician  [] Other    Prognosis for POC: [x] Good [] Fair  [] Poor      Patient requires continued skilled intervention: [x] Yes  [] No    Treatment/Activity Tolerance:  [x] Patient able to complete treatment  [x] Patient limited by fatigue  [x] Patient limited by pain    [] Patient limited by other medical complications  [] Other:     Return to Play: (if applicable)   []  Stage 1: Intro to Strength   []  Stage 2: Return to Run and Strength   []  Stage 3: Return to Jump and Strength   []  Stage 4: Dynamic Strength and Agility   []  Stage 5: Sport Specific Training     []  Ready to Return to Play, Meets All Above Stages   []  Not Ready for Return to Sports   Comments:              PLAN: See tia. Pt to be seen 2 times a week for 12 weeks.    [x] Continue per plan of care [] Alter current plan (see comments above)  [] Plan of care initiated [] Hold pending MD visit [] Discharge      Electronically signed by:  Demetra Wiseman PT    Note: If patient does not return for scheduled/ recommended follow up visits, this note will serve as a discharge from care along with most recent update on progress.

## 2022-10-10 ENCOUNTER — HOSPITAL ENCOUNTER (OUTPATIENT)
Dept: PHYSICAL THERAPY | Age: 37
Setting detail: THERAPIES SERIES
Discharge: HOME | End: 2022-10-10
Payer: COMMERCIAL

## 2022-10-10 PROCEDURE — 97140 MANUAL THERAPY 1/> REGIONS: CPT

## 2022-10-10 PROCEDURE — 97110 THERAPEUTIC EXERCISES: CPT

## 2022-10-10 PROCEDURE — 97016 VASOPNEUMATIC DEVICE THERAPY: CPT

## 2022-10-10 NOTE — FLOWSHEET NOTE
[facility] [address], [address]  Phone: [phone]  Fax [phone]        Date:  10/10/2022    Patient Name:  Migdalia Goldberg    :  1985  MRN: [id number]  Restrictions/Precautions:    Medical/Treatment Diagnosis Information:  Diagnosis: Y45.366J (ICD-10-CM) - Superior labrum anterior-to-posterior (SLAP) tear of right shoulder  Treatment Diagnosis: M25. 611 R shoulder stiffness, M25.411 R shoulder effusion, M62.81 R shoulder weakness, M25.511 R shoulder pain  Insurance/Certification information:  PT Insurance Information: [facility], 90 visits hard limit, no auth  Physician Information:   Dr. Brad Arrieta  Has the plan of care been signed (Y/N):        []  Yes  [x]  No     Date of Patient follow up with Physician:        Is this a Progress Report:     []  Yes  [x]  No        If Yes:  Date Range for reporting period:  Beginnin22  Ending    Progress report will be due (10 Rx or 30 days whichever is less):         Recertification will be due (POC Duration  / 90 days whichever is less): 22      Visit # Insurance Allowable Auth Required   In-person 5 90 visits (4 used already) []  Yes [x]  No    Telehealth - - []  Yes []  No    Total            Functional Scale: FOTO shoulder score: 36/100 = 36% ability, 64% disability  Date assessed:  22       Number of Comorbidities:  [x]0     []1-2    []3+    Latex Allergy:  [x]NO      []YES  Preferred Language for Healthcare:   [x]English       []other:      Pain level:  2/10 No meds    SUBJECTIVE:   Pt says that her arm is more sore after her work days but overall sees improvement and is feeling better.      OBJECTIVE:   Observation: rounded shoulders  Test measurements:       ROM Left AROM Right PROM Right PROM Right  10/7/22   Shoulder Flex 170 35 deg 100 115   Shoulder Abd 175 N/a 70 100   Shoulder ER 65 5 deg 30 46   Shoulder IR T4 N/a RESTRICTIONS/PRECAUTIONS: s/p R shoulder SLAP and bicep tenodesis 9/20/22    Exercises/Interventions:     Therapeutic Ex (14809) Sets/Reps/ sec Notes/CUES   Table slides: flex, abd    Wand AAROM: ER 45 degrees supine   Scap squeeze x20    PROM elbow flex/ext    Shrugs up/down, fwd/bwd, CW/CCW 2x10ea    Pedulums    ER isometric    IR isometric    Abduction isometric    Pulleys: flex, abd X10 ea    Supine wand bench press, + flexion x10ea    Supine wand ER x10            Pt edu: D/C sling but still no reaching or lifting, use of arm rest for return to work next week, icing. Manual Intervention (22429)     PROM: flexion, ER, abduction 20 min    Jt Mobs: posterior: grad II-III 5 min                        NMR re-education (70809)  CUES NEEDED                                                Therapeutic Activity (18174)                              Game Ready 15 min Min Compression     Patient Education:  Access Code: [email or web address]  URL: [email or web address]  Date: 10/03/2022  Prepared by: Arnaldo Duarte    Exercises  Seated Scapular Retraction - 2 x daily - 7 x weekly - 3 sets - 10 reps  Seated Shoulder Flexion Slide at Table Top with Forearm in Neutral (Mirrored) - 2 x daily - 7 x weekly - 1 sets - 10 reps - 10 sec hold  Seated Shoulder Abduction Towel Slide at Table Top with Forearm in Neutral (Mirrored) - 2 x daily - 7 x weekly - 1 sets - 10 reps - 10 sec hold  Standing Isometric Shoulder Internal Rotation at Doorway - 2 x daily - 7 x weekly - 1 sets - 10 reps - 10 sec hold  Standing Isometric Shoulder External Rotation with Doorway (Mirrored) - 2 x daily - 7 x weekly - 1 sets - 10 reps - 10 sec hold  Isometric Shoulder Abduction - Arm Straight at Wall (Mirrored) - 2 x daily - 7 x weekly - 1 sets - 10 reps - 10 sec hold  Supine Shoulder External Rotation in 45 Degrees Abduction AAROM with Dowel (Mirrored) - 2 x daily - 7 x weekly - 1 sets - 10 reps - 10 sec hold      Therapeutic Exercise and NMR EXR  [x] (78620) Provided verbal/tactile cueing for activities related to strengthening, flexibility, endurance, ROM  for improvements in scapular, scapulothoracic and UE control with self care, reaching, carrying, lifting, house/yardwork, driving/computer work.    [] (81591) Provided verbal/tactile cueing for activities related to improving balance, coordination, kinesthetic sense, posture, motor skill, proprioception  to assist with  scapular, scapulothoracic and UE control with self care, reaching, carrying, lifting, house/yardwork, driving/computer work. Therapeutic Activities:    [] (76115 or 61690) Provided verbal/tactile cueing for activities related to improving balance, coordination, kinesthetic sense, posture, motor skill, proprioception and motor activation to allow for proper function of scapular, scapulothoracic and UE control with self care, carrying, lifting, driving/computer work.      Home Exercise Program:    [] (53094) Reviewed/Progressed HEP activities related to strengthening, flexibility, endurance, ROM of scapular, scapulothoracic and UE control with self care, reaching, carrying, lifting, house/yardwork, driving/computer work  [] (90737) Reviewed/Progressed HEP activities related to improving balance, coordination, kinesthetic sense, posture, motor skill, proprioception of scapular, scapulothoracic and UE control with self care, reaching, carrying, lifting, house/yardwork, driving/computer work      Manual Treatments:  PROM / STM / Oscillations-Mobs:  G-I, II, III, IV (PA's, Inf., Post.)  [x] (40654) Provided manual therapy to mobilize soft tissue/joints of cervical/CT, scapular GHJ and UE for the purpose of modulating pain, promoting relaxation,  increasing ROM, reducing/eliminating soft tissue swelling/inflammation/restriction, improving soft tissue extensibility and allowing for proper ROM for normal function with self care, reaching, carrying, lifting, house/yardwork, driving/computer work    Modalities: Game Ready to address inflammation and pain x 15 min    Charges  Timed Code Treatment Minutes: 45   Total Treatment Minutes: 60     [] EVAL (LOW) 17169   [] EVAL (MOD) 30688   [] EVAL (HIGH) 54868   [] RE-EVAL     [x] IH(79351) x 1    [] IONTO  [] NMR (33097) x     [x] VASO  [x] Manual (29833) x  2 [] Other:  [] TA x      [] Mech Traction (36613)  [] ES(attended) (78269)      [] ES (un) (20184):     GOALS:  Patient stated goal: playing overhead volleyball  [] Progressing: [] Met: [] Not Met: [] Adjusted    Therapist goals for Patient:   Short Term Goals: To be achieved in: 2 weeks  1. Independent in HEP and progression per patient tolerance, in order to prevent re-injury. [] Progressing: [] Met: [] Not Met: [] Adjusted  2. Patient will have a decrease in pain to facilitate improvement in movement, function, and ADLs as indicated by Functional Deficits. [] Progressing: [] Met: [] Not Met: [] Adjusted    Long Term Goals: To be achieved in: 12 weeks  1. Disability index score of 10% or better for the FOTO shoulder score to assist with reaching prior level of function. [] Progressing: [] Met: [] Not Met: [] Adjusted  2. Patient will demonstrate increased AROM to WNL and symmetrical to uninvolved limb to allow for proper joint functioning as indicated by patients Functional Deficits. [] Progressing: [] Met: [] Not Met: [] Adjusted  3. Patient will demonstrate an increase in Strength to 4+/5 to allow for proper functional mobility as indicated by patients Functional Deficits. [] Progressing: [] Met: [] Not Met: [] Adjusted  4. Patient will be able to lift a 5lbs object overhead with normal scapulohumeral rhythm without increased symptoms or restriction. [] Progressing: [] Met: [] Not Met: [] Adjusted  5. Pt will be able to perform UE plyometric activity in OKC or CKC without increased symptoms or restriction to assist her in returning to an active lifestyle and PLOF.    [] Progressing: [] Met: [] Not Met: [] Adjusted     Progression Towards Functional goals:  [] Patient is progressing as expected towards functional goals listed. [] Progression is slowed due to complexities listed. [] Progression has been slowed due to co-morbidities. [x] Plan just implemented, too soon to assess goals progression  [] Other:     ASSESSMENT:  Pt's jt glides are improving and PROM improving with ER most painful for her. We began some light AAROM but not yet ready to add those into HEP. Overall Progression Towards Functional goals/ Treatment Progress Update:  [] Patient is progressing as expected towards functional goals listed. [] Progression is slowed due to complexities/Impairments listed. [] Progression has been slowed due to co-morbidities. [x] Plan just implemented, too soon to assess goals progression <30days   [] Goals require adjustment due to lack of progress  [] Patient is not progressing as expected and requires additional follow up with physician  [] Other    Prognosis for POC: [x] Good [] Fair  [] Poor      Patient requires continued skilled intervention: [x] Yes  [] No    Treatment/Activity Tolerance:  [x] Patient able to complete treatment  [x] Patient limited by fatigue  [x] Patient limited by pain    [] Patient limited by other medical complications  [] Other:     Return to Play: (if applicable)   []  Stage 1: Intro to Strength   []  Stage 2: Return to Run and Strength   []  Stage 3: Return to Jump and Strength   []  Stage 4: Dynamic Strength and Agility   []  Stage 5: Sport Specific Training     []  Ready to Return to Play, Meets All Above Stages   []  Not Ready for Return to Sports   Comments:              PLAN: See eval. Pt to be seen 2 times a week for 12 weeks.    [x] Continue per plan of care [] Alter current plan (see comments above)  [] Plan of care initiated [] Hold pending MD visit [] Discharge      Electronically signed by:  David Rush PT    Note: If patient does not return for scheduled/ recommended follow up visits, this note will serve as a discharge from care along with most recent update on progress.

## 2022-10-14 ENCOUNTER — HOSPITAL ENCOUNTER (OUTPATIENT)
Dept: PHYSICAL THERAPY | Age: 37
Setting detail: THERAPIES SERIES
Discharge: HOME | End: 2022-10-14
Payer: COMMERCIAL

## 2022-10-14 PROCEDURE — 97140 MANUAL THERAPY 1/> REGIONS: CPT

## 2022-10-14 PROCEDURE — 97016 VASOPNEUMATIC DEVICE THERAPY: CPT

## 2022-10-14 PROCEDURE — 97110 THERAPEUTIC EXERCISES: CPT

## 2022-10-14 NOTE — FLOWSHEET NOTE
The [address] [place] [address],[address], [address]  Phone: [phone]  Fax [phone]        Date:  10/14/2022    Patient Name:  Mitchell Lacy    :  1985  MRN: [id number]  Restrictions/Precautions:    Medical/Treatment Diagnosis Information:  Diagnosis: P75.846P (ICD-10-CM) - Superior labrum anterior-to-posterior (SLAP) tear of right shoulder  Treatment Diagnosis: M25. 611 R shoulder stiffness, M25.411 R shoulder effusion, M62.81 R shoulder weakness, M25.511 R shoulder pain  Insurance/Certification information:  PT Insurance Information: [facility], 90 visits hard limit, no auth  Physician Information:   Dr. Garcia Even  Has the plan of care been signed (Y/N):        []  Yes  [x]  No     Date of Patient follow up with Physician:        Is this a Progress Report:     []  Yes  [x]  No        If Yes:  Date Range for reporting period:  Beginnin22  Ending    Progress report will be due (10 Rx or 30 days whichever is less):         Recertification will be due (POC Duration  / 90 days whichever is less): 22      Visit # Insurance Allowable Auth Required   In-person 6 90 visits (4 used already) []  Yes [x]  No    Telehealth - - []  Yes []  No    Total            Functional Scale: FOTO shoulder score: 36/100 = 36% ability, 64% disability  Date assessed:  22       Number of Comorbidities:  [x]0     []1-2    []3+    Latex Allergy:  [x]NO      []YES  Preferred Language for Healthcare:   [x]English       []other:      Pain level:  2/10 No meds    SUBJECTIVE:   Pt reports her arm has been more stiff and sore this week because she thinks she is using her arm a little too much with return to work as well as 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 OBJECTIVE:   Observation: rounded shoulders present but improving, incisions healing well.    Test measurements:       ROM Left AROM Right PROM Right PROM Right  10/7/22 Right  10/14/22   Shoulder Flex 170 35 deg 100 115 125   Shoulder Abd 175 N/a 70 100 110   Shoulder ER 65 5 deg 30 46 46   Shoulder IR T4 N/a          RESTRICTIONS/PRECAUTIONS: s/p R shoulder SLAP and bicep tenodesis 9/20/22    Exercises/Interventions:     Therapeutic Ex (28908) Sets/Reps/ sec Notes/CUES   Table slides: flex, abd    Wand AAROM: ER 45 degrees supine   Scap squeeze x20    PROM elbow flex/ext    Shrugs up/down, fwd/bwd, CW/CCW 2x10ea    Pedulums X20 ea direction    ER isometric    IR isometric    Abduction isometric    Pulleys: flex, abd 1X10 ea    Supine wand bench press, + flexion 2x10ea    Supine wand ER 2x10    S/L ER AROM  S/L and supine UE alphabet 2x10  2 sets ea        Pt edu: D/C sling but still no reaching or lifting, use of arm rest for return to work next week, icing. Manual Intervention ([id number])     PROM: flexion, ER, abduction 10 min    Jt Mobs: posterior: grad II-III 5 min                        NMR re-education (38634)  CUES NEEDED                                                Therapeutic Activity (22802)                              Game Ready 15 min Min Compression       Therapeutic Exercise and NMR EXR  [x] (09829) Provided verbal/tactile cueing for activities related to strengthening, flexibility, endurance, ROM  for improvements in scapular, scapulothoracic and UE control with self care, reaching, carrying, lifting, house/yardwork, driving/computer work.    [] (42017) Provided verbal/tactile cueing for activities related to improving balance, coordination, kinesthetic sense, posture, motor skill, proprioception  to assist with  scapular, scapulothoracic and UE control with self care, reaching, carrying, lifting, house/yardwork, driving/computer work.     Therapeutic Activities:    [] (62284 or 34002) Provided verbal/tactile cueing for activities related to improving balance, coordination, kinesthetic sense, posture, motor skill, proprioception and motor activation to allow for proper function of scapular, scapulothoracic and UE control with self care, carrying, lifting, driving/computer work. Home Exercise Program:    [x] (21741) Reviewed/Progressed HEP activities related to strengthening, flexibility, endurance, ROM of scapular, scapulothoracic and UE control with self care, reaching, carrying, lifting, house/yardwork, driving/computer work  [] (81548) Reviewed/Progressed HEP activities related to improving balance, coordination, kinesthetic sense, posture, motor skill, proprioception of scapular, scapulothoracic and UE control with self care, reaching, carrying, lifting, house/yardwork, driving/computer work      Access Code: [email or web address]  URL: [email or web address]  Date: 10/14/2022  Prepared by: Bobo Scott    Exercises  Supine Shoulder Press with Dowel - 1 x daily - 7 x weekly - 2-3 sets - 10 reps  Supine Shoulder Flexion with Dowel - 1 x daily - 7 x weekly - 2-3 sets - 10 reps  Supine Shoulder External Rotation in 45 Degrees Abduction AAROM with Dowel - 1 x daily - 7 x weekly - 2-3 sets - 10 reps  Supine Shoulder Alphabet - 1 x daily - 7 x weekly - 2-3 sets - 1 reps  Sidelying Shoulder External Rotation - 1 x daily - 7 x weekly - 2-3 sets - 10 reps    Manual Treatments:  PROM / STM / Oscillations-Mobs:  G-I, II, III, IV (PA's, Inf., Post.)  [x] (68794) Provided manual therapy to mobilize soft tissue/joints of cervical/CT, scapular GHJ and UE for the purpose of modulating pain, promoting relaxation,  increasing ROM, reducing/eliminating soft tissue swelling/inflammation/restriction, improving soft tissue extensibility and allowing for proper ROM for normal function with self care, reaching, carrying, lifting, house/yardwork, driving/computer work    Modalities: Game Ready to address inflammation and pain x 15 min    Charges  Timed Code Treatment Minutes: 45   Total Treatment Minutes: 60     [] EVAL (LOW) 30630   [] EVAL (MOD) 91563   [] EVAL (HIGH) 39767   [] RE-EVAL     [x] VI(54073) x 2   [] IONTO  [] NMR (25718) x     [x] VASO  [x] Manual (68178) x  1 [] Other:  [] TA x      [] Mech Traction (27237)  [] ES(attended) (67964)      [] ES (un) (75634):     GOALS:  Patient stated goal: playing overhead volleyball  [] Progressing: [] Met: [] Not Met: [] Adjusted    Therapist goals for Patient:   Short Term Goals: To be achieved in: 2 weeks  1. Independent in HEP and progression per patient tolerance, in order to prevent re-injury. [] Progressing: [x] Met: [] Not Met: [] Adjusted  2. Patient will have a decrease in pain to facilitate improvement in movement, function, and ADLs as indicated by Functional Deficits. [x] Progressing: [] Met: [] Not Met: [] Adjusted    Long Term Goals: To be achieved in: 12 weeks  1. Disability index score of 10% or better for the FOTO shoulder score to assist with reaching prior level of function. [] Progressing: [] Met: [] Not Met: [] Adjusted  2. Patient will demonstrate increased AROM to WNL and symmetrical to uninvolved limb to allow for proper joint functioning as indicated by patients Functional Deficits. [] Progressing: [] Met: [] Not Met: [] Adjusted  3. Patient will demonstrate an increase in Strength to 4+/5 to allow for proper functional mobility as indicated by patients Functional Deficits. [] Progressing: [] Met: [] Not Met: [] Adjusted  4. Patient will be able to lift a 5lbs object overhead with normal scapulohumeral rhythm without increased symptoms or restriction. [] Progressing: [] Met: [] Not Met: [] Adjusted  5. Pt will be able to perform UE plyometric activity in OKC or CKC without increased symptoms or restriction to assist her in returning to an active lifestyle and PLOF. [] Progressing: [] Met: [] Not Met: [] Adjusted     Progression Towards Functional goals:  [] Patient is progressing as expected towards functional goals listed. [] Progression is slowed due to complexities listed.   [] Progression has been slowed due to co-morbidities. [x] Plan just implemented, too soon to assess goals progression  [] Other:     ASSESSMENT:  Pt continues to progress in her PROM along with decreasing pain and inflammation. Pt was able to start an AAROM program in gravity assisted positions for [address] stability without pain. Overall Progression Towards Functional goals/ Treatment Progress Update:  [] Patient is progressing as expected towards functional goals listed. [] Progression is slowed due to complexities/Impairments listed. [] Progression has been slowed due to co-morbidities. [x] Plan just implemented, too soon to assess goals progression <30days   [] Goals require adjustment due to lack of progress  [] Patient is not progressing as expected and requires additional follow up with physician  [] Other    Prognosis for POC: [x] Good [] Fair  [] Poor      Patient requires continued skilled intervention: [x] Yes  [] No    Treatment/Activity Tolerance:  [x] Patient able to complete treatment  [] Patient limited by fatigue  [] Patient limited by pain    [] Patient limited by other medical complications  [] Other:     Return to Play: (if applicable)   []  Stage 1: Intro to Strength   []  Stage 2: Return to Run and Strength   []  Stage 3: Return to Jump and Strength   []  Stage 4: Dynamic Strength and Agility   []  Stage 5: Sport Specific Training     []  Ready to Return to Play, Meets All Above Stages   []  Not Ready for Return to Sports   Comments:              PLAN: See tia. Pt to be seen 2 times a week for 12 weeks. [x] Continue per plan of care [] Alter current plan (see comments above)  [] Plan of care initiated [] Hold pending MD visit [] Discharge      Electronically signed by:  Demetra Wiseman PT    Note: If patient does not return for scheduled/ recommended follow up visits, this note will serve as a discharge from care along with most recent update on progress.

## 2022-10-17 ENCOUNTER — HOSPITAL ENCOUNTER (OUTPATIENT)
Dept: PHYSICAL THERAPY | Age: 37
Setting detail: THERAPIES SERIES
Discharge: HOME | End: 2022-10-17
Payer: COMMERCIAL

## 2022-10-17 PROCEDURE — 97016 VASOPNEUMATIC DEVICE THERAPY: CPT

## 2022-10-17 PROCEDURE — 97140 MANUAL THERAPY 1/> REGIONS: CPT

## 2022-10-17 PROCEDURE — 97110 THERAPEUTIC EXERCISES: CPT

## 2022-10-17 NOTE — FLOWSHEET NOTE
The [address]  Phone: [phone]  Fax [phone]        Date:  10/17/2022    Patient Name:  Sylvia Workman    :  1985  MRN: [id number]  Restrictions/Precautions:    Medical/Treatment Diagnosis Information:  Diagnosis: C99.164V (ICD-10-CM) - Superior labrum anterior-to-posterior (SLAP) tear of right shoulder  Treatment Diagnosis: M25. 611 R shoulder stiffness, M25.411 R shoulder effusion, M62.81 R shoulder weakness, M25.511 R shoulder pain  Insurance/Certification information:  PT Insurance Information: [facility], 90 visits hard limit, no auth  Physician Information:   Dr. Lam Houston  Has the plan of care been signed (Y/N):        []  Yes  [x]  No     Date of Patient follow up with Physician:        Is this a Progress Report:     []  Yes  [x]  No        If Yes:  Date Range for reporting period:  Beginnin22  Ending    Progress report will be due (10 Rx or 30 days whichever is less):         Recertification will be due (POC Duration  / 90 days whichever is less): 22      Visit # Insurance Allowable Auth Required   In-person 7 90 visits (4 used already) []  Yes [x]  No    Telehealth - - []  Yes []  No    Total            Functional Scale: FOTO shoulder score: 36/100 = 36% ability, 64% disability  Date assessed:  22                                  FOTO shoulder score: 59/100 = 59% ability, 41% ability on 10/17/22      Number of Comorbidities:  [x]0     []1-2    []3+    Latex Allergy:  [x]NO      []YES  Preferred Language for Healthcare:   [x]English       []other:      Pain level:  2/10 No meds    SUBJECTIVE:   Pt says that she still finds herself using the R arm a little too much making it sore but overall feels it is healing well. HEP is going okay. She still is icing at home. Sleeping is getting better but still can't sleep on the R side.      OBJECTIVE:   Observation: rounded shoulders present but improving, incisions healing well. Test measurements:       ROM Left AROM Right PROM Right PROM Right  10/7/22 Right  10/14/22 Right  10/17/22   Shoulder Flex 170 35 deg 100 115 125 160 deg   Shoulder Abd 175 N/a 70 100 110 134 deg   Shoulder ER 65 5 deg 30 46 46 54 deg   Shoulder IR T4 N/a    67 deg       RESTRICTIONS/PRECAUTIONS: s/p R shoulder SLAP and bicep tenodesis 9/20/22    Exercises/Interventions:     Therapeutic Ex (39799) Sets/Reps/ sec Notes/CUES   Table slides: flex, abd    Wand AAROM: ER 45 degrees supine   Scap squeeze    PROM elbow flex/ext    Shrugs up/down, fwd/bwd, CW/CCW 1x10ea    Pedulums 2X20 ea direction    ER isometric    IR isometric    Abduction isometric    Pulleys: flex, abd 1X10 ea    Supine wand bench press, + flexion 1x10ea    Supine wand ER 1x10    S/L ER AROM  S/L and supine UE alphabet 1x10  1sets ea    Tball on table AAROM flexion & horiz abd/add 2x10ea    Tband scap retract rows & B ext Red 2x10ea            Pt edu: D/C sling but still no reaching or lifting, use of arm rest for return to work next week, icing.      Manual Intervention ([phone])     PROM: flexion, ER, abduction 8 min    Jt Mobs: posterior: grad II-III 2 min                        NMR re-education (95184)  CUES NEEDED                                                Therapeutic Activity (18225)                              Game Ready 15 min Min Compression       Therapeutic Exercise and NMR EXR  [x] (82674) Provided verbal/tactile cueing for activities related to strengthening, flexibility, endurance, ROM  for improvements in scapular, scapulothoracic and UE control with self care, reaching, carrying, lifting, house/yardwork, driving/computer work.    [] (26893) Provided verbal/tactile cueing for activities related to improving balance, coordination, kinesthetic sense, posture, motor skill, proprioception  to assist with  scapular, scapulothoracic and UE control with self care, reaching, carrying, lifting, house/yardwork, driving/computer work. Therapeutic Activities:    [] (10450 or 04075) Provided verbal/tactile cueing for activities related to improving balance, coordination, kinesthetic sense, posture, motor skill, proprioception and motor activation to allow for proper function of scapular, scapulothoracic and UE control with self care, carrying, lifting, driving/computer work. Home Exercise Program:    [] (34406) Reviewed/Progressed HEP activities related to strengthening, flexibility, endurance, ROM of scapular, scapulothoracic and UE control with self care, reaching, carrying, lifting, house/yardwork, driving/computer work  [] (87995) Reviewed/Progressed HEP activities related to improving balance, coordination, kinesthetic sense, posture, motor skill, proprioception of scapular, scapulothoracic and UE control with self care, reaching, carrying, lifting, house/yardwork, driving/computer work      Access Code: [email or web address]  URL: [employer]  Date: 10/14/2022  Prepared by: Cindy Payton    Exercises  Supine Shoulder Press with Dowel - 1 x daily - 7 x weekly - 2-3 sets - 10 reps  Supine Shoulder Flexion with Dowel - 1 x daily - 7 x weekly - 2-3 sets - 10 reps  Supine Shoulder External Rotation in 45 Degrees Abduction AAROM with Dowel - 1 x daily - 7 x weekly - 2-3 sets - 10 reps  Supine Shoulder Alphabet - 1 x daily - 7 x weekly - 2-3 sets - 1 reps  Sidelying Shoulder External Rotation - 1 x daily - 7 x weekly - 2-3 sets - 10 reps    Manual Treatments:  PROM / STM / Oscillations-Mobs:  G-I, II, III, IV (PA's, Inf., Post.)  [x] (40538) Provided manual therapy to mobilize soft tissue/joints of cervical/CT, scapular GHJ and UE for the purpose of modulating pain, promoting relaxation,  increasing ROM, reducing/eliminating soft tissue swelling/inflammation/restriction, improving soft tissue extensibility and allowing for proper ROM for normal function with self care, reaching, carrying, lifting, house/yardwork, driving/computer work    Modalities: Game Ready to address inflammation and pain x 15 min    Charges  Timed Code Treatment Minutes: 45   Total Treatment Minutes: 60     [] EVAL (LOW) 19160   [] EVAL (MOD) 82266   [] EVAL (HIGH) 76987   [] RE-EVAL     [x] WA(37698) x 2   [] IONTO  [] NMR (82342) x     [x] VASO  [x] Manual (72116) x  1 [] Other:  [] TA x      [] Mech Traction (86407)  [] ES(attended) (11942)      [] ES (un) (07271):     GOALS:  Patient stated goal: playing overhead volleyball  [] Progressing: [] Met: [] Not Met: [] Adjusted    Therapist goals for Patient:   Short Term Goals: To be achieved in: 2 weeks  1. Independent in HEP and progression per patient tolerance, in order to prevent re-injury. [] Progressing: [x] Met: [] Not Met: [] Adjusted  2. Patient will have a decrease in pain to facilitate improvement in movement, function, and ADLs as indicated by Functional Deficits. [x] Progressing: [] Met: [] Not Met: [] Adjusted    Long Term Goals: To be achieved in: 12 weeks  1. Disability index score of 10% or better for the FOTO shoulder score to assist with reaching prior level of function. [] Progressing: [] Met: [] Not Met: [] Adjusted  2. Patient will demonstrate increased AROM to WNL and symmetrical to uninvolved limb to allow for proper joint functioning as indicated by patients Functional Deficits. [] Progressing: [] Met: [] Not Met: [] Adjusted  3. Patient will demonstrate an increase in Strength to 4+/5 to allow for proper functional mobility as indicated by patients Functional Deficits. [] Progressing: [] Met: [] Not Met: [] Adjusted  4. Patient will be able to lift a 5lbs object overhead with normal scapulohumeral rhythm without increased symptoms or restriction. [] Progressing: [] Met: [] Not Met: [] Adjusted  5.  Pt will be able to perform UE plyometric activity in [place] or [facility] without increased symptoms or restriction to assist her in returning to an active lifestyle and PLOF. [] Progressing: [] Met: [] Not Met: [] Adjusted     Progression Towards Functional goals:  [] Patient is progressing as expected towards functional goals listed. [] Progression is slowed due to complexities listed. [] Progression has been slowed due to co-morbidities. [x] Plan just implemented, too soon to assess goals progression  [] Other:     ASSESSMENT:  Pt is doing very well with steady progress in her PROM in all directions. Posture is improving as well as ability to perform accurate scapular retraction. Pt will see physician on Thursday to determine further progress in rehab protocol. Overall Progression Towards Functional goals/ Treatment Progress Update:  [] Patient is progressing as expected towards functional goals listed. [] Progression is slowed due to complexities/Impairments listed. [] Progression has been slowed due to co-morbidities. [x] Plan just implemented, too soon to assess goals progression <30days   [] Goals require adjustment due to lack of progress  [] Patient is not progressing as expected and requires additional follow up with physician  [] Other    Prognosis for POC: [x] Good [] Fair  [] Poor      Patient requires continued skilled intervention: [x] Yes  [] No    Treatment/Activity Tolerance:  [x] Patient able to complete treatment  [] Patient limited by fatigue  [] Patient limited by pain    [] Patient limited by other medical complications  [] Other:     Return to Play: (if applicable)   []  Stage 1: Intro to Strength   []  Stage 2: Return to Run and Strength   []  Stage 3: Return to Jump and Strength   []  Stage 4: Dynamic Strength and Agility   []  Stage 5: Sport Specific Training     []  Ready to Return to Play, Meets All Above Stages   []  Not Ready for Return to Sports   Comments:              PLAN: See tia. Pt to be seen 2 times a week for 12 weeks.    [x] Continue per plan of care [] Alter current plan (see comments above)  [] Plan of care initiated [] Hold pending MD visit [] Discharge      Electronically signed by:  Charly Spine, PT    Note: If patient does not return for scheduled/ recommended follow up visits, this note will serve as a discharge from care along with most recent update on progress.

## 2022-10-20 ENCOUNTER — OFFICE VISIT (OUTPATIENT)
Dept: ORTHOPEDIC SURGERY | Age: 37
End: 2022-10-20

## 2022-10-20 VITALS — HEIGHT: 67 IN | WEIGHT: 151 LBS | BODY MASS INDEX: 23.7 KG/M2

## 2022-10-20 DIAGNOSIS — M67.813 BICEPS TENDONOSIS OF RIGHT SHOULDER: ICD-10-CM

## 2022-10-20 DIAGNOSIS — Z98.890 S/P ARTHROSCOPY OF RIGHT SHOULDER: Primary | ICD-10-CM

## 2022-10-20 PROCEDURE — 99024 POSTOP FOLLOW-UP VISIT: CPT | Performed by: ORTHOPAEDIC SURGERY

## 2022-10-20 RX ORDER — CLOTRIMAZOLE AND BETAMETHASONE DIPROPIONATE 10; .64 MG/G; MG/G
CREAM TOPICAL
COMMUNITY
Start: 2022-10-12

## 2022-10-20 NOTE — LETTER
Shoulder Elbow Rehabilitation Referral    Patient Name: Meghan Epps      YOB: 1985    Diagnosis: No diagnosis found. Precautions:     Post Op Instructions:  [] Continuous passive motion (CPM)  [x] Elbow range of motion  [] Exercise in plane of scapula   []  Strengthening     [] Pulley and instruction    [x] Home exercise program (copy to patient)   [] Sling when arm at risk  [] Sling or brace at all times   [] AAROM: Forward elevation to             [] AAROM: External rotation to     [] Isometric external rotator strengthening [] AAROM: internal rotation: up the back  [] Isometric abductor strengthening  [] AAROM: Internal abduction     [] Isometric internal rotator strengthening [] AAROM: cross-body adduction             Stretching:     Strengthening:  [] Four quadrant (FE, ER, IR, CBA)  [] Rotator cuff (ER, IR, Abd)  [] Forward Elevation    [x] External Rotators     [] External Rotation    [] Internal Rotators  [] Internal Rotation: up/back   [] Abductors     [] Internal Rotation: supine in abduction  [] Flexors  [] Cross-body abduction    [] Extensors  [] Pendulum (FE, Abd/Add, cw/ccw)  [x] Scapular Stabilizers   [] Wall-walking (FE, Abd)    [x] Shoulder shrugs     [] Table slides      [x] Rhomboid pinch  [] Elbow (flex, ext, pron, sup)    [] Lat.  Pull downs     [] Medial epicondylitis program    [] Forward punch   [] Lateral epicondylitis program    [] Internal rotators     [] Progressive resistive exercises  [] Bench Press        [] Bench press plus  Activities:     [] Lateral pull-downs  [] Rowing     [] Progressive two-hand supine press  [] Stepper/Exercise bike   [] Biceps: curls/supination  [] Swimming  [] Water exercises    Modalities: PRN    Return to Sport:  [] Ultrasound     [] Plyometrics  [] Iontophoresis     [] Rhythmic stabilization  [] Moist heat     [] Core strengthening   [] Massage     [] Sports specific program:   [x] Cryotherapy      [] Electrical stimulation     [] Paraffin  [] Whirlpool  [] TENS    [x] Home exercise program (copy to patient). Perform exercises for:   15     minutes    2-3      times/day  [x] Supervised physical therapy  Frequency: []  1x week  [x] 2x week  [] 3x week  [] Other:   Duration: [] 2 weeks   [] 4 weeks  [x] 6 weeks  [] Other:     Additional Instructions:   DC brace -   Elbow ROM  External rotation strengthening    Lizzy La MD [place] [facility]    Orthopaedic Surgeon, Clinical Fellow  [facility]   On behalf of           Ivette Pizarro MD, PhD

## 2022-10-20 NOTE — PROGRESS NOTES
Chief Complaint    Shoulder Pain (F/u right shoulder)      History of Present Illness: Orlando Tapia is a pleasant, 30 y.o., female, here today for follow up of right shoulder. She is 4 weeks postop right shoulder arthroscopic extensive debridement subacromial bursa, bursal sided effusions as well as superior labrum and rotator interval, open subpectoral biceps tenodesis on 9/20/2022. She has continued in physical therapy at [facility]. Overall she is doing well. She states that she is progressing with physical therapy. The pain is well controlled and she has no concerns with the incisions. She reports no new injuries or setbacks. Pain Assessment  Location of Pain: Shoulder  Location Modifiers: Right  Severity of Pain: 2  Quality of Pain: Other (Comment) (tightness)  Duration of Pain: Persistent  Frequency of Pain: Constant  Aggravating Factors: Other (Comment)  Limiting Behavior: Some  Relieving Factors: Rest, Ice, Exercise, Other (Comment)  Work-Related Injury: No  Are there other pain locations you wish to document?: No      Medical History:  Patient's medications, allergies, past medical, surgical, social and family histories were reviewed and updated as appropriate. No notes on file    Review of Systems  A 14 point review of systems was completed by the patient and is available in the media section of the scanned medical record and was reviewed on 10/20/2022. The review is negative with the exception of those things mentioned in the HPI and Past Medical History    Vital Signs: There were no vitals filed for this visit. General/Appearance: Alert and oriented and in no apparent distress. Skin:  There are no skin lesions, cellulitis, or extreme edema. The patient has warm and well-perfused Bilateral upper extremities with brisk capillary refill. Right shoulder Exam:  Inspection:  No gross deformities, no signs of infection. Active Range of Motion:   Forward Elevation 90, Abduction knee, External Rotation 10, Internal Rotation belt    Passive Range of Motion: Forward Elevation 120, Abduction 120, External Rotation 30, Internal Rotation belt    Strength: Deferred        Neurovascular: Sensation to light touch is intact, no motor deficits, palpable radial pulses 2+      Radiology:     No new XR obtained at this time. Assessment :  Ms. Princess Garza is a pleasant, 30 y.o. patient who is month postop rotator interval, subacromial debridement with open biceps tenodesis      Impression:  Encounter Diagnoses   Name Primary? Biceps tendonosis of right shoulder     S/P arthroscopy of right shoulder Yes       Office Procedures:  Orders Placed This Encounter   Procedures    [facility]     Referral Priority:   Routine     Referral Type:   Eval and Treat     Referral Reason:   Specialty Services Required     Requested Specialty:   Physical Therapist     Number of Visits Requested:   1         Treatment Plan: This points to continue with physical therapy. We recommend this patient continue in physical therapy. A new physical therapy letter was documented in [facility] today. She is progressing very well and that is where we would expect at this stage postop. We will see Mikaelbelkis Wagner back in 3 weeks and/or as needed. All questions were answered to patient's satisfaction and She was encouraged to call with any further questions or concerns. Mitchell Gomez is in agreement with this plan. 10/20/2022  4:27 PM  Arleen Amdaor MD [facility]    Orthopaedic Surgeon, Clinical Fellow  [facility] and [facility]       The encounter with the patient was supervised by Dr. Eva Rainey who personally examined the patient and reviewed the plan. This dictation was performed with a verbal recognition program (DRAGON) and it was checked for errors. It is possible that there are still dictated errors within this office note.   If so, please bring any errors to my attention for an addendum. All efforts were made to ensure that this office note is accurate.  ______________________  I was physically present and personally supervised the Orthopaedic Sports Medicine Fellow in the evaluation and development of a treatment plan for this patient. I personally interviewed the patient and performed a physical examination. In addition, I discussed the patient's condition and treatment options with them. I have also reviewed and agree with the past medical, family and social history unless otherwise noted. All of the patient's questions were answered. Ivette Arrieta MD, PhD  10/20/2022

## 2022-10-21 ENCOUNTER — HOSPITAL ENCOUNTER (OUTPATIENT)
Dept: PHYSICAL THERAPY | Age: 37
Setting detail: THERAPIES SERIES
Discharge: HOME | End: 2022-10-21
Payer: COMMERCIAL

## 2022-10-21 PROCEDURE — 97140 MANUAL THERAPY 1/> REGIONS: CPT

## 2022-10-21 PROCEDURE — 97110 THERAPEUTIC EXERCISES: CPT

## 2022-10-21 PROCEDURE — 97016 VASOPNEUMATIC DEVICE THERAPY: CPT

## 2022-10-21 NOTE — FLOWSHEET NOTE
[facility] [address], [address]  Phone: [phone]  Fax [phone]        Date:  10/21/2022    Patient Name:  Gabriela Lau    :  1985  MRN: [id number]  Restrictions/Precautions:    Medical/Treatment Diagnosis Information:  Diagnosis: W20.742W (ICD-10-CM) - Superior labrum anterior-to-posterior (SLAP) tear of right shoulder  Treatment Diagnosis: M25. 611 R shoulder stiffness, M25.411 R shoulder effusion, M62.81 R shoulder weakness, M25.511 R shoulder pain  Insurance/Certification information:  PT Insurance Information: [facility], 90 visits hard limit, no auth  Physician Information:   Dr. Celina Quintero  Has the plan of care been signed (Y/N):        []  Yes  [x]  No     Date of Patient follow up with Physician:        Is this a Progress Report:     []  Yes  [x]  No        If Yes:  Date Range for reporting period:  Beginnin22  Ending    Progress report will be due (10 Rx or 30 days whichever is less):  45       Recertification will be due (POC Duration  / 90 days whichever is less): 22      Visit # Insurance Allowable Auth Required   In-person 7 90 visits (4 used already) []  Yes [x]  No    Telehealth - - []  Yes []  No    Total            Functional Scale: FOTO shoulder score: 36/100 = 36% ability, 64% disability  Date assessed:  22                                  FOTO shoulder score: 59/100 = 59% ability, 41% ability on 10/17/22      Number of Comorbidities:  [x]0     []1-2    []3+    Latex Allergy:  [x]NO      []YES  Preferred Language for Healthcare:   [x]English       []other:      Pain level:  1/10     SUBJECTIVE:   Pt reports that her MD appt went well and he told her she could progress to more active motion in PT but still no high level lifting or resistance and still no sports yet. He will see her back in about 3 weeks.      OBJECTIVE:   Observation: rounded shoulders present but improving, incisions healing well.    Test measurements:       ROM Left AROM Right PROM Right PROM Right  10/7/22 Right  10/14/22 Right  10/17/22   Shoulder Flex 170 35 deg 100 115 125 160 deg   Shoulder Abd 175 N/a 70 100 110 134 deg   Shoulder ER 65 5 deg 30 46 46 54 deg   Shoulder IR T4 N/a    67 deg       RESTRICTIONS/PRECAUTIONS: s/p R shoulder SLAP and bicep tenodesis 9/20/22    Exercises/Interventions:     Therapeutic Ex (64813) Sets/Reps/ sec Notes/CUES   Table slides: flex, abd    Wand AAROM: ER 45 degrees supine   Scap squeeze    PROM elbow flex/ext    Shrugs up/down, fwd/bwd, CW/CCW    Pedulums    ER isometric    IR isometric    Abduction isometric    Pulleys: flex, abd    Supine wand bench press, + flexion    Supine wand ER    S/L ER AROM  S/L and supine UE alphabet    Tball on table AAROM flexion & horiz abd/add    Tband scap retract rows & B ext    Wall crawls: flex, scaption 2x5x5\" ea    Standing wand IR hoiz behind back 2x5x5\"        Pt edu: ADL use of arm 2 min    Manual Intervention (89393)     PROM: flexion, abd, IR & ER @ 45 deg and @ 90 deg 15 min    Jt Mobs: posterior & inferior: grad II-III 4 min                        NMR re-education (16458)  CUES NEEDED                                                Therapeutic Activity (63369)                              Game Ready 15 min Min Compression       Therapeutic Exercise and NMR EXR  [x] (45527) Provided verbal/tactile cueing for activities related to strengthening, flexibility, endurance, ROM  for improvements in scapular, scapulothoracic and UE control with self care, reaching, carrying, lifting, house/yardwork, driving/computer work.    [] (36869) Provided verbal/tactile cueing for activities related to improving balance, coordination, kinesthetic sense, posture, motor skill, proprioception  to assist with  scapular, scapulothoracic and UE control with self care, reaching, carrying, lifting, house/yardwork, driving/computer work.    Therapeutic Activities:    [] (99124 or 72897) Provided verbal/tactile cueing for activities related to improving balance, coordination, kinesthetic sense, posture, motor skill, proprioception and motor activation to allow for proper function of scapular, scapulothoracic and UE control with self care, carrying, lifting, driving/computer work. Home Exercise Program:    [x] (89584) Reviewed/Progressed HEP activities related to strengthening, flexibility, endurance, ROM of scapular, scapulothoracic and UE control with self care, reaching, carrying, lifting, house/yardwork, driving/computer work  [] (50068) Reviewed/Progressed HEP activities related to improving balance, coordination, kinesthetic sense, posture, motor skill, proprioception of scapular, scapulothoracic and UE control with self care, reaching, carrying, lifting, house/yardwork, driving/computer work      Access Code: [email or web address]  URL: [email or web address]  Date: 10/21/2022  Prepared by: Guru Yadav    Exercises  Standing Single Shoulder Flexion Wall Slide with Palm Up - 1 x daily - 7 x weekly - 3 sets - 10 reps  Standing Shoulder Abduction Slides at Wall - 1 x daily - 7 x weekly - 3 sets - 10 reps      Manual Treatments:  PROM / STM / Oscillations-Mobs:  G-I, II, III, IV (PA's, Inf., Post.)  [x] (77572) Provided manual therapy to mobilize soft tissue/joints of cervical/CT, scapular GHJ and UE for the purpose of modulating pain, promoting relaxation,  increasing ROM, reducing/eliminating soft tissue swelling/inflammation/restriction, improving soft tissue extensibility and allowing for proper ROM for normal function with self care, reaching, carrying, lifting, house/yardwork, driving/computer work    Modalities: Game Ready to address inflammation and pain x 15 min    Charges  Timed Code Treatment Minutes: 45   Total Treatment Minutes: 60     [] EVAL (LOW) 84658   [] EVAL (MOD) 03680   [] EVAL (HIGH) 91401   [] RE-EVAL     [x] CL(10347) co-morbidities. [x] Plan just implemented, too soon to assess goals progression  [] Other:     ASSESSMENT:  Pt had less pain and improved scapulohumeral rhythm and increased ROM in wall crawls 2nd set after manual therapy was performed. PT doing well. Overall Progression Towards Functional goals/ Treatment Progress Update:  [] Patient is progressing as expected towards functional goals listed. [] Progression is slowed due to complexities/Impairments listed. [] Progression has been slowed due to co-morbidities. [x] Plan just implemented, too soon to assess goals progression <30days   [] Goals require adjustment due to lack of progress  [] Patient is not progressing as expected and requires additional follow up with physician  [] Other    Prognosis for POC: [x] Good [] Fair  [] Poor      Patient requires continued skilled intervention: [x] Yes  [] No    Treatment/Activity Tolerance:  [x] Patient able to complete treatment  [] Patient limited by fatigue  [] Patient limited by pain    [] Patient limited by other medical complications  [] Other:     Return to Play: (if applicable)   []  Stage 1: Intro to Strength   []  Stage 2: Return to Run and Strength   []  Stage 3: Return to Jump and Strength   []  Stage 4: Dynamic Strength and Agility   []  Stage 5: Sport Specific Training     []  Ready to Return to Play, Meets All Above Stages   []  Not Ready for Return to Sports   Comments:              PLAN: See eval. Pt to be seen 2 times a week for 12 weeks. [x] Continue per plan of care [] Alter current plan (see comments above)  [] Plan of care initiated [] Hold pending MD visit [] Discharge      Electronically signed by:  Cindy Payton PT    Note: If patient does not return for scheduled/ recommended follow up visits, this note will serve as a discharge from care along with most recent update on progress.

## 2022-10-24 ENCOUNTER — HOSPITAL ENCOUNTER (OUTPATIENT)
Dept: PHYSICAL THERAPY | Age: 37
Setting detail: THERAPIES SERIES
Discharge: HOME | End: 2022-10-24
Payer: COMMERCIAL

## 2022-10-24 PROCEDURE — 97110 THERAPEUTIC EXERCISES: CPT

## 2022-10-24 PROCEDURE — 97140 MANUAL THERAPY 1/> REGIONS: CPT

## 2022-10-24 PROCEDURE — 97016 VASOPNEUMATIC DEVICE THERAPY: CPT

## 2022-10-24 NOTE — FLOWSHEET NOTE
The [address] [place] [address],[address], [facility], [address]  Phone: [phone]  Fax [phone]        Date:  10/24/2022    Patient Name:  Eulalio Michelle    :  1985  MRN: [id number]  Restrictions/Precautions:    Medical/Treatment Diagnosis Information:  Diagnosis: I89.549W (ICD-10-CM) - Superior labrum anterior-to-posterior (SLAP) tear of right shoulder  Treatment Diagnosis: M25. 611 R shoulder stiffness, M25.411 R shoulder effusion, M62.81 R shoulder weakness, M25.511 R shoulder pain  Insurance/Certification information:  PT Insurance Information: [facility], 90 visits hard limit, no auth  Physician Information:   Dr. Ethan Marmolejo  Has the plan of care been signed (Y/N):        []  Yes  [x]  No     Date of Patient follow up with Physician:        Is this a Progress Report:     []  Yes  [x]  No        If Yes:  Date Range for reporting period:  Beginnin22  Ending    Progress report will be due (10 Rx or 30 days whichever is less):         Recertification will be due (POC Duration  / 90 days whichever is less): 22      Visit # Insurance Allowable Auth Required   In-person 8 90 visits (4 used already) []  Yes [x]  No    Telehealth - - []  Yes []  No    Total            Functional Scale: FOTO shoulder score: 36/100 = 36% ability, 64% disability  Date assessed:  22                                  FOTO shoulder score: 59/100 = 59% ability, 41% ability on 10/17/22      Number of Comorbidities:  [x]0     []1-2    []3+    Latex Allergy:  [x]NO      []YES  Preferred Language for Healthcare:   [x]English       []other:      Pain level:  1/10     SUBJECTIVE:   Pt says she worked hard on her wall walks over the weekend and they are very tough but they are helping. Shoulder feels okay today.      OBJECTIVE:   Observation: mild hypomobility R GH posterior glide  Test measurements:       ROM Left AROM Right PROM Right PROM Right  10/7/22 Right  10/14/22 Right  10/17/22 Right  10/24/22   Shoulder Flex 170 35 deg 100 115 125 160 deg 160 deg   Shoulder Abd 175 N/a 70 100 110 134 deg 142 deg   Shoulder ER 65 5 deg 30 46 46 54 deg 70 deg   Shoulder IR T4 N/a    67 deg 67 deg       RESTRICTIONS/PRECAUTIONS: s/p R shoulder SLAP and bicep tenodesis 9/20/22    Exercises/Interventions:     Therapeutic Ex (84436) Sets/Reps/ sec Notes/CUES   Table slides: flex, abd    Wand AAROM: ER 45 degrees supine   Scap squeeze    PROM elbow flex/ext    Shrugs up/down, fwd/bwd, CW/CCW    Pedulums    ER isometric    IR isometric    Abduction isometric    Pulleys: flex, abd    Supine flexion AROM 2x10ea    Supine wand ER    S/L ER AROM  S/L and supine UE alphabet 2x10  2sets ea    S/L abduction AROM 2x10    Tball on table AAROM flexion & horiz abd/add    Tband scap retract rows & B ext    Wall crawls: flex, scaption    Standing wand IR hoiz behind back    Standing B ER AROM 2x10                Pt edu: ADL use of arm    Manual Intervention (48696)     PROM: flexion, abd, IR & ER @ 45 deg and @ 90 deg 11 min    Jt Mobs: posterior & inferior: grad II-III 4 min                        NMR re-education (51380)  CUES NEEDED                                                Therapeutic Activity (43958)                              Game Ready 15 min Min Compression       Therapeutic Exercise and NMR EXR  [x] (88350) Provided verbal/tactile cueing for activities related to strengthening, flexibility, endurance, ROM  for improvements in scapular, scapulothoracic and UE control with self care, reaching, carrying, lifting, house/yardwork, driving/computer work.    [] (69849) Provided verbal/tactile cueing for activities related to improving balance, coordination, kinesthetic sense, posture, motor skill, proprioception  to assist with  scapular, scapulothoracic and UE control with self care, reaching, carrying, lifting, house/yardwork, driving/computer work.     Therapeutic Activities:    [] (35673 or 53510) Provided verbal/tactile cueing for activities related to improving balance, coordination, kinesthetic sense, posture, motor skill, proprioception and motor activation to allow for proper function of scapular, scapulothoracic and UE control with self care, carrying, lifting, driving/computer work. Home Exercise Program:    [x] (31713) Reviewed/Progressed HEP activities related to strengthening, flexibility, endurance, ROM of scapular, scapulothoracic and UE control with self care, reaching, carrying, lifting, house/yardwork, driving/computer work  [] (27770) Reviewed/Progressed HEP activities related to improving balance, coordination, kinesthetic sense, posture, motor skill, proprioception of scapular, scapulothoracic and UE control with self care, reaching, carrying, lifting, house/yardwork, driving/computer work      Access Code: [email or web address]  URL: [email or web address]  Date: 10/24/2022  Prepared by: Akilah Jack    Exercises  Standing Shoulder Flexion Wall Walk - 1 x daily - 7 x weekly - 3 sets - 10 reps  Standing Shoulder Scaption Wall Walk - 1 x daily - 7 x weekly - 3 sets - 10 reps  Supine Shoulder Flexion Extension Full Range AROM - 1 x daily - 7 x weekly - 3 sets - 10 reps  Supine Shoulder Alphabet - 1 x daily - 7 x weekly - 3 sets - 10 reps  Sidelying Shoulder External Rotation - 1 x daily - 7 x weekly - 3 sets - 10 reps  Sidelying Shoulder Abduction Palm Forward - 1 x daily - 7 x weekly - 3 sets - 10 reps  Shoulder External Rotation and Scapular Retraction - 1 x daily - 7 x weekly - 3 sets - 10 reps        Manual Treatments:  PROM / STM / Oscillations-Mobs:  G-I, II, III, IV (PA's, Inf., Post.)  [x] (21244) Provided manual therapy to mobilize soft tissue/joints of cervical/CT, scapular GHJ and UE for the purpose of modulating pain, promoting relaxation,  increasing ROM, reducing/eliminating soft tissue swelling/inflammation/restriction, improving soft tissue extensibility and allowing for proper ROM for normal function with self care, reaching, carrying, lifting, house/yardwork, driving/computer work    Modalities: Game Ready to address inflammation and pain x 15 min    Charges  Timed Code Treatment Minutes: 45   Total Treatment Minutes: 60     [] EVAL (LOW) 29780   [] EVAL (MOD) 15191   [] EVAL (HIGH) 55344   [] RE-EVAL     [x] RG(81524) x 2   [] IONTO  [] NMR (34510) x     [x] VASO  [x] Manual (69444) x  1 [] Other:  [] TA x      [] Mech Traction (27229)  [] ES(attended) (35499)      [] ES (un) (69068):     GOALS:  Patient stated goal: playing overhead volleyball  [] Progressing: [] Met: [] Not Met: [] Adjusted    Therapist goals for Patient:   Short Term Goals: To be achieved in: 2 weeks  1. Independent in HEP and progression per patient tolerance, in order to prevent re-injury. [] Progressing: [x] Met: [] Not Met: [] Adjusted  2. Patient will have a decrease in pain to facilitate improvement in movement, function, and ADLs as indicated by Functional Deficits. [x] Progressing: [] Met: [] Not Met: [] Adjusted    Long Term Goals: To be achieved in: 12 weeks  1. Disability index score of 10% or better for the FOTO shoulder score to assist with reaching prior level of function. [] Progressing: [] Met: [] Not Met: [] Adjusted  2. Patient will demonstrate increased AROM to WNL and symmetrical to uninvolved limb to allow for proper joint functioning as indicated by patients Functional Deficits. [] Progressing: [] Met: [] Not Met: [] Adjusted  3. Patient will demonstrate an increase in Strength to 4+/5 to allow for proper functional mobility as indicated by patients Functional Deficits. [] Progressing: [] Met: [] Not Met: [] Adjusted  4. Patient will be able to lift a 5lbs object overhead with normal scapulohumeral rhythm without increased symptoms or restriction. [] Progressing: [] Met: [] Not Met: [] Adjusted  5.  Pt will be able to perform UE plyometric activity in [place] or [facility] without increased symptoms or restriction to assist her in returning to an active lifestyle and PLOF. [] Progressing: [] Met: [] Not Met: [] Adjusted     Progression Towards Functional goals:  [] Patient is progressing as expected towards functional goals listed. [] Progression is slowed due to complexities listed. [] Progression has been slowed due to co-morbidities. [x] Plan just implemented, too soon to assess goals progression  [] Other:     ASSESSMENT:  Pt continues to progress in PROM and gravity assisted AROM without increased pain. Overall Progression Towards Functional goals/ Treatment Progress Update:  [] Patient is progressing as expected towards functional goals listed. [] Progression is slowed due to complexities/Impairments listed. [] Progression has been slowed due to co-morbidities. [x] Plan just implemented, too soon to assess goals progression <30days   [] Goals require adjustment due to lack of progress  [] Patient is not progressing as expected and requires additional follow up with physician  [] Other    Prognosis for POC: [x] Good [] Fair  [] Poor      Patient requires continued skilled intervention: [x] Yes  [] No    Treatment/Activity Tolerance:  [x] Patient able to complete treatment  [] Patient limited by fatigue  [] Patient limited by pain    [] Patient limited by other medical complications  [] Other:     Return to Play: (if applicable)   []  Stage 1: Intro to Strength   []  Stage 2: Return to Run and Strength   []  Stage 3: Return to Jump and Strength   []  Stage 4: Dynamic Strength and Agility   []  Stage 5: Sport Specific Training     []  Ready to Return to Play, Meets All Above Stages   []  Not Ready for Return to Sports   Comments:              PLAN: See tia. Pt to be seen 2 times a week for 12 weeks.    [x] Continue per plan of care [] Alter current plan (see comments above)  [] Plan of care initiated [] Hold pending MD visit [] Discharge      Electronically signed by:  Ledon Babinski, PT    Note: If patient does not return for scheduled/ recommended follow up visits, this note will serve as a discharge from care along with most recent update on progress.

## 2022-10-28 ENCOUNTER — HOSPITAL ENCOUNTER (OUTPATIENT)
Dept: PHYSICAL THERAPY | Age: 37
Setting detail: THERAPIES SERIES
Discharge: HOME | End: 2022-10-28
Payer: COMMERCIAL

## 2022-10-28 PROCEDURE — 97110 THERAPEUTIC EXERCISES: CPT

## 2022-10-28 PROCEDURE — 97140 MANUAL THERAPY 1/> REGIONS: CPT

## 2022-10-28 PROCEDURE — 97016 VASOPNEUMATIC DEVICE THERAPY: CPT

## 2022-10-28 NOTE — PROGRESS NOTES
[facility] and [address], [facility], [address]  Phone: [phone]  Fax [phone]        Date:  10/28/2022    Patient Name:  Jessica Urban    :  1985  MRN: [id number]  Restrictions/Precautions:    Medical/Treatment Diagnosis Information:  Diagnosis: L09.025I (ICD-10-CM) - Superior labrum anterior-to-posterior (SLAP) tear of right shoulder  Treatment Diagnosis: M25. 611 R shoulder stiffness, M25.411 R shoulder effusion, M62.81 R shoulder weakness, M25.511 R shoulder pain  Insurance/Certification information:  PT Insurance Information: [facility], 90 visits hard limit, no auth  Physician Information:   Dr. Channing Mccain  Has the plan of care been signed (Y/N):        []  Yes  [x]  No     Date of Patient follow up with Physician:        Is this a Progress Report:     [x]  Yes  []  No        If Yes:  Date Range for reporting period:  Beginnin22  Ending 10/28/22    Progress report will be due (10 Rx or 30 days whichever is less):  69      Recertification will be due (POC Duration  / 90 days whichever is less): 22      Visit # Insurance Allowable Auth Required   In-person 9 90 visits (4 used already) []  Yes [x]  No    Telehealth - - []  Yes []  No    Total            Functional Scale: FOTO shoulder score: 36/100 = 36% ability, 64% disability  Date assessed:  22                                  FOTO shoulder score: 59/100 = 59% ability, 41% ability on 10/17/22      Number of Comorbidities:  [x]0     []1-2    []3+    Latex Allergy:  [x]NO      []YES  Preferred Language for Healthcare:   [x]English       []other:      Pain level:  1/10     SUBJECTIVE:   Pt says shoulder feel pretty good today. She feels the new HEP is helping. She feels that slowing some ADLs are gettign easier such as getting dressed, countertop height activities, and typing at computer.      OBJECTIVE:   Observation: mild hypomobility R GH posterior glide  Test measurements:       ROM Left AROM Right PROM Right PROM Right  10/7/22 Right  10/14/22 Right  10/17/22 Right  10/24/22   Shoulder Flex 170 35 deg 100 115 125 160 deg 160 deg   Shoulder Abd 175 N/a 70 100 110 134 deg 142 deg   Shoulder ER 65 5 deg 30 46 46 54 deg 70 deg   Shoulder IR T4 N/a    67 deg 67 deg       RESTRICTIONS/PRECAUTIONS: s/p R shoulder SLAP and bicep tenodesis 9/20/22    Exercises/Interventions:     Therapeutic Ex (69320) Sets/Reps/ sec Notes/CUES   Table slides: flex, abd    Wand AAROM: ER 45 degrees supine   Scap squeeze    PROM elbow flex/ext    Shrugs up/down, fwd/bwd, CW/CCW    Pedulums    ER isometric    IR isometric    Abduction isometric    Pulleys: flex, abd 1X10 ea    Supine flexion AROM    Supine wand ER    S/L ER AROM  S/L ER with abd punch  S/L and supine UE alphabet 2x10  2x10  PT assist at end-range   S/L abduction AROM    Tball on table AAROM flexion & horiz abd/add 2x10ea    Tband scap retract rows & B ext    Wall crawls: flex, scaption    Standing wand IR hoiz behind back    Standing B ER AROM    Prone scap retract: row,   ext,   horiz abd,   45/90 ER 1# 2x10  1# 2x10  0# 2x10  0# 2x10            Pt edu: ADL use of arm    Manual Intervention (08991)     PROM: flexion, abd, IR & ER @ 45 deg and @ 90 deg 12 min    Jt Mobs: posterior & inferior: grad II-III 3 min                        NMR re-education (14270)  CUES NEEDED                                                Therapeutic Activity (70952)                              Game Ready 15 min Min Compression       Therapeutic Exercise and NMR EXR  [x] (28523) Provided verbal/tactile cueing for activities related to strengthening, flexibility, endurance, ROM  for improvements in scapular, scapulothoracic and UE control with self care, reaching, carrying, lifting, house/yardwork, driving/computer work.    [] (17025) Provided verbal/tactile cueing for activities related to improving balance, coordination, kinesthetic sense, posture, motor skill, proprioception  to assist with  scapular, scapulothoracic and UE control with self care, reaching, carrying, lifting, house/yardwork, driving/computer work. Therapeutic Activities:    [] (22031 or 64175) Provided verbal/tactile cueing for activities related to improving balance, coordination, kinesthetic sense, posture, motor skill, proprioception and motor activation to allow for proper function of scapular, scapulothoracic and UE control with self care, carrying, lifting, driving/computer work. Home Exercise Program:    [x] (24632) Reviewed/Progressed HEP activities related to strengthening, flexibility, endurance, ROM of scapular, scapulothoracic and UE control with self care, reaching, carrying, lifting, house/yardwork, driving/computer work  [] (81850) Reviewed/Progressed HEP activities related to improving balance, coordination, kinesthetic sense, posture, motor skill, proprioception of scapular, scapulothoracic and UE control with self care, reaching, carrying, lifting, house/yardwork, driving/computer work      Access Code: [email or web address]  URL: [email or web address]  Date: 10/24/2022  Prepared by: Marley Ruiz  Standing Shoulder Flexion Wall Walk - 1 x daily - 7 x weekly - 3 sets - 10 reps  Standing Shoulder Scaption Wall Walk - 1 x daily - 7 x weekly - 3 sets - 10 reps  Supine Shoulder Flexion Extension Full Range AROM - 1 x daily - 7 x weekly - 3 sets - 10 reps  Supine Shoulder Alphabet - 1 x daily - 7 x weekly - 3 sets - 10 reps  Sidelying Shoulder External Rotation - 1 x daily - 7 x weekly - 3 sets - 10 reps  Sidelying Shoulder Abduction Palm Forward - 1 x daily - 7 x weekly - 3 sets - 10 reps  Shoulder External Rotation and Scapular Retraction - 1 x daily - 7 x weekly - 3 sets - 10 reps        Manual Treatments:  PROM / STM / Oscillations-Mobs:  G-I, II, III, IV (PA's, Inf., Post.)  [x] (87908) Provided manual therapy to mobilize soft tissue/joints of cervical/CT, scapular GHJ and UE for the purpose of modulating pain, promoting relaxation,  increasing ROM, reducing/eliminating soft tissue swelling/inflammation/restriction, improving soft tissue extensibility and allowing for proper ROM for normal function with self care, reaching, carrying, lifting, house/yardwork, driving/computer work    Modalities: Game Ready to address inflammation and pain x 15 min    Charges  Timed Code Treatment Minutes: 45   Total Treatment Minutes: 60     [] EVAL (LOW) 93019   [] EVAL (MOD) 39264   [] EVAL (HIGH) 38566   [] RE-EVAL     [x] JW(58564) x 2   [] IONTO  [] NMR (82708) x     [x] VASO  [x] Manual (31462) x  1 [] Other:  [] TA x      [] Mech Traction (90744)  [] ES(attended) (31299)      [] ES (un) (54438):     GOALS:  Patient stated goal: playing overhead volleyball  [] Progressing: [] Met: [] Not Met: [] Adjusted    Therapist goals for Patient:   Short Term Goals: To be achieved in: 2 weeks  1. Independent in HEP and progression per patient tolerance, in order to prevent re-injury. [] Progressing: [x] Met: [] Not Met: [] Adjusted  2. Patient will have a decrease in pain to facilitate improvement in movement, function, and ADLs as indicated by Functional Deficits. [] Progressing: [x] Met: [] Not Met: [] Adjusted    Long Term Goals: To be achieved in: 12 weeks  1. Disability index score of 10% or better for the FOTO shoulder score to assist with reaching prior level of function. [] Progressing: [] Met: [] Not Met: [] Adjusted  2. Patient will demonstrate increased AROM to WNL and symmetrical to uninvolved limb to allow for proper joint functioning as indicated by patients Functional Deficits. [x] Progressing: [] Met: [] Not Met: [] Adjusted  3. Patient will demonstrate an increase in Strength to 4+/5 to allow for proper functional mobility as indicated by patients Functional Deficits. [] Progressing: [] Met: [x] Not Met: [] Adjusted  4.  Patient will be able to lift a 5lbs object overhead with normal scapulohumeral rhythm without increased symptoms or restriction. [] Progressing: [] Met: [x] Not Met: [] Adjusted  5. Pt will be able to perform UE plyometric activity in [place] or CKC without increased symptoms or restriction to assist her in returning to an active lifestyle and PLOF. [] Progressing: [] Met: [x] Not Met: [] Adjusted     Progression Towards Functional goals:  [x] Patient is progressing as expected towards functional goals listed. [] Progression is slowed due to complexities listed. [] Progression has been slowed due to co-morbidities. [] Plan just implemented, too soon to assess goals progression  [] Other:     ASSESSMENT:  Pt is progressing as expected and on schedule for her post-op status after SLAP repair. She is showing good healing and nearly a full return of PROM and reduced pain. She continues to have expected impairments with AROM overhead and overall strength that will benefit from continued PT. Overall Progression Towards Functional goals/ Treatment Progress Update:  [x] Patient is progressing as expected towards functional goals listed. [] Progression is slowed due to complexities/Impairments listed. [] Progression has been slowed due to co-morbidities.   [] Plan just implemented, too soon to assess goals progression <30days   [] Goals require adjustment due to lack of progress  [] Patient is not progressing as expected and requires additional follow up with physician  [] Other    Prognosis for POC: [x] Good [] Fair  [] Poor      Patient requires continued skilled intervention: [x] Yes  [] No    Treatment/Activity Tolerance:  [x] Patient able to complete treatment  [] Patient limited by fatigue  [] Patient limited by pain    [] Patient limited by other medical complications  [] Other:     Return to Play: (if applicable)   []  Stage 1: Intro to Strength   []  Stage 2: Return to Run and Strength   []  Stage 3: Return to Jump and Strength   []  Stage 4: Dynamic Strength and Agility   []  Stage 5: Sport Specific Training     []  Ready to Return to Play, Meets All Above Stages   []  Not Ready for Return to Sports   Comments:              PLAN: See eval. Pt to be seen 2 times a week for 12 weeks. [x] Continue per plan of care [] Alter current plan (see comments above)  [] Plan of care initiated [] Hold pending MD visit [] Discharge      Electronically signed by:  Jimenez Toledo PT    Note: If patient does not return for scheduled/ recommended follow up visits, this note will serve as a discharge from care along with most recent update on progress.

## 2022-10-31 ENCOUNTER — HOSPITAL ENCOUNTER (OUTPATIENT)
Dept: PHYSICAL THERAPY | Age: 37
Setting detail: THERAPIES SERIES
Discharge: HOME | End: 2022-10-31
Payer: COMMERCIAL

## 2022-10-31 PROCEDURE — 97140 MANUAL THERAPY 1/> REGIONS: CPT | Performed by: PHYSICAL THERAPIST

## 2022-10-31 PROCEDURE — 97016 VASOPNEUMATIC DEVICE THERAPY: CPT | Performed by: PHYSICAL THERAPIST

## 2022-10-31 PROCEDURE — 97110 THERAPEUTIC EXERCISES: CPT | Performed by: PHYSICAL THERAPIST

## 2022-10-31 NOTE — FLOWSHEET NOTE
[facility] [address] [facility], [address]  Phone: [phone]  Fax [phone]        Date:  10/31/2022    Patient Name:  Georgia Roa    :  1985  MRN: [id number]  Restrictions/Precautions:    Medical/Treatment Diagnosis Information:  Diagnosis: R86.937E (ICD-10-CM) - Superior labrum anterior-to-posterior (SLAP) tear of right shoulder  Treatment Diagnosis: M25. 611 R shoulder stiffness, M25.411 R shoulder effusion, M62.81 R shoulder weakness, M25.511 R shoulder pain  Insurance/Certification information:  PT Insurance Information: [facility], 90 visits hard limit, no auth  Physician Information:   Dr. Lady Hayes  Has the plan of care been signed (Y/N):        []  Yes  [x]  No     Date of Patient follow up with Physician:  11/10/22      Is this a Progress Report:     []  Yes  [x]  No        If Yes:  Date Range for reporting period:  Beginning:   Ending     Progress report will be due (10 Rx or 30 days whichever is less):        Recertification will be due (POC Duration  / 90 days whichever is less): 22      Visit # Insurance Allowable Auth Required   In-person 10 90 visits (4 used already) []  Yes [x]  No    Telehealth - - []  Yes []  No    Total            Functional Scale: FOTO shoulder score: 36/100 = 36% ability, 64% disability  Date assessed:  22                                  FOTO shoulder score: 59/100 = 59% ability, 41% ability on 10/17/22      Number of Comorbidities:  [x]0     []1-2    []3+    Latex Allergy:  [x]NO      []YES  Preferred Language for Healthcare:   [x]English       []other:      Pain level:  1/10     SUBJECTIVE:   Pt is sore today due to increased activity at home over the weekend and manual stretching last week. She is frustrated with how the shoulder is feeling lately.      OBJECTIVE:   Observation: mild hypomobility R GH posterior glide  Test measurements:       ROM Left AROM Right PROM Right PROM Right  10/7/22 Right  10/14/22 Right  10/17/22 Right  10/24/22   Shoulder Flex 170 35 deg 100 115 125 160 deg 160 deg   Shoulder Abd 175 N/a 70 100 110 134 deg 142 deg   Shoulder ER 65 5 deg 30 46 46 54 deg 70 deg   Shoulder IR T4 N/a    67 deg 67 deg       RESTRICTIONS/PRECAUTIONS: s/p R shoulder SLAP and bicep tenodesis 9/20/22    Exercises/Interventions:     Therapeutic Ex (52032) Sets/Reps/ sec Notes/CUES   Table slides: flex, abd    Wand AAROM: ER 45 degrees [id number] supine   Scap squeeze    PROM elbow flex/ext    Shrugs up/down, fwd/bwd, CW/CCW    Pedulums    ER isometric    IR isometric    Abduction isometric    Pulleys: flex, abd 1X10 ea    Supine flexion AROM 2x10ea    Supine wand ER    S/L ER AROM  S/L ER with abd punch  S/L and supine UE alphabet 2x10  2x10  PT assist at end-range   S/L abduction AROM    Tball on table AAROM flexion & horiz abd/add 2x10ea    Tband scap retract rows & B ext    Wall crawls: flex, scaption    Standing wand IR hoiz behind back 2x5x5\"    Standing B ER AROM    Prone scap retract: row,   ext,   horiz abd,   45/90 ER 1# 2x10  1# 2x10  0# 2x10  0# 2x10            Pt edu: ADL use of arm    Manual Intervention (43489)     PROM: flexion, abd, IR & ER @ 45 deg and @ 90 deg 12 min    Jt Mobs: posterior & inferior: grad II-III                        NMR re-education (54434)  CUES NEEDED                                                Therapeutic Activity (60602)                              Game Ready 15 min Min Compression       Therapeutic Exercise and NMR EXR  [x] (05587) Provided verbal/tactile cueing for activities related to strengthening, flexibility, endurance, ROM  for improvements in scapular, scapulothoracic and UE control with self care, reaching, carrying, lifting, house/yardwork, driving/computer work.    [] (95568) Provided verbal/tactile cueing for activities related to improving balance, coordination, kinesthetic sense, posture, motor skill, proprioception  to assist with  scapular, scapulothoracic and UE control with self care, reaching, carrying, lifting, house/yardwork, driving/computer work. Therapeutic Activities:    [] (14311 or 94321) Provided verbal/tactile cueing for activities related to improving balance, coordination, kinesthetic sense, posture, motor skill, proprioception and motor activation to allow for proper function of scapular, scapulothoracic and UE control with self care, carrying, lifting, driving/computer work. Home Exercise Program:    [x] (93371) Reviewed/Progressed HEP activities related to strengthening, flexibility, endurance, ROM of scapular, scapulothoracic and UE control with self care, reaching, carrying, lifting, house/yardwork, driving/computer work  [] (26541) Reviewed/Progressed HEP activities related to improving balance, coordination, kinesthetic sense, posture, motor skill, proprioception of scapular, scapulothoracic and UE control with self care, reaching, carrying, lifting, house/yardwork, driving/computer work      Access Code: [email or web address]  URL: [email or web address]  Date: 10/24/2022  Prepared by: Eduardo Watkins    Exercises  Standing Shoulder Flexion Wall Walk - 1 x daily - 7 x weekly - 3 sets - 10 reps  Standing Shoulder Scaption Wall Walk - 1 x daily - 7 x weekly - 3 sets - 10 reps  Supine Shoulder Flexion Extension Full Range AROM - 1 x daily - 7 x weekly - 3 sets - 10 reps  Supine Shoulder Alphabet - 1 x daily - 7 x weekly - 3 sets - 10 reps  Sidelying Shoulder External Rotation - 1 x daily - 7 x weekly - 3 sets - 10 reps  Sidelying Shoulder Abduction Palm Forward - 1 x daily - 7 x weekly - 3 sets - 10 reps  Shoulder External Rotation and Scapular Retraction - 1 x daily - 7 x weekly - 3 sets - 10 reps  10/31/22: added ER ROM with cane 45 degrees; IR BB across to spine      Manual Treatments:  PROM / STM / Oscillations-Mobs:  G-I, II, III, IV (PA's, Inf., Post.)  [x] (56822) Provided manual therapy to mobilize soft tissue/joints of cervical/CT, scapular GHJ and UE for the purpose of modulating pain, promoting relaxation,  increasing ROM, reducing/eliminating soft tissue swelling/inflammation/restriction, improving soft tissue extensibility and allowing for proper ROM for normal function with self care, reaching, carrying, lifting, house/yardwork, driving/computer work    Modalities: Game Ready to address inflammation and pain x 15 min    Charges  Timed Code Treatment Minutes: 45   Total Treatment Minutes: 60     [] EVAL (LOW) 65161   [] EVAL (MOD) 21439   [] EVAL (HIGH) 99818   [] RE-EVAL     [x] HR(91434) x 2   [] IONTO  [] NMR (38620) x     [x] VASO  [x] Manual (92860) x  1 [] Other:  [] TA x      [] Mech Traction (55451)  [] ES(attended) (52229)      [] ES (un) (04282):     GOALS:  Patient stated goal: playing overhead volleyball  [] Progressing: [] Met: [] Not Met: [] Adjusted    Therapist goals for Patient:   Short Term Goals: To be achieved in: 2 weeks  1. Independent in HEP and progression per patient tolerance, in order to prevent re-injury. [] Progressing: [x] Met: [] Not Met: [] Adjusted  2. Patient will have a decrease in pain to facilitate improvement in movement, function, and ADLs as indicated by Functional Deficits. [] Progressing: [x] Met: [] Not Met: [] Adjusted    Long Term Goals: To be achieved in: 12 weeks  1. Disability index score of 10% or better for the FOTO shoulder score to assist with reaching prior level of function. [] Progressing: [] Met: [] Not Met: [] Adjusted  2. Patient will demonstrate increased AROM to WNL and symmetrical to uninvolved limb to allow for proper joint functioning as indicated by patients Functional Deficits. [x] Progressing: [] Met: [] Not Met: [] Adjusted  3. Patient will demonstrate an increase in Strength to 4+/5 to allow for proper functional mobility as indicated by patients Functional Deficits.    [] Progressing: [] Met: [x] Not Met: [] Adjusted  4. Patient will be able to lift a 5lbs object overhead with normal scapulohumeral rhythm without increased symptoms or restriction. [] Progressing: [] Met: [x] Not Met: [] Adjusted  5. Pt will be able to perform UE plyometric activity in [place] or CKC without increased symptoms or restriction to assist her in returning to an active lifestyle and PLOF. [] Progressing: [] Met: [x] Not Met: [] Adjusted     Progression Towards Functional goals:  [x] Patient is progressing as expected towards functional goals listed. [] Progression is slowed due to complexities listed. [] Progression has been slowed due to co-morbidities. [] Plan just implemented, too soon to assess goals progression  [] Other:     ASSESSMENT:  Pt has joint stiffness and decreased ROM today. Poor AROM due to decreased strength and joint stiffness. VCs for exercise technique. Overall Progression Towards Functional goals/ Treatment Progress Update:  [x] Patient is progressing as expected towards functional goals listed. [] Progression is slowed due to complexities/Impairments listed. [] Progression has been slowed due to co-morbidities.   [] Plan just implemented, too soon to assess goals progression <30days   [] Goals require adjustment due to lack of progress  [] Patient is not progressing as expected and requires additional follow up with physician  [] Other    Prognosis for POC: [x] Good [] Fair  [] Poor      Patient requires continued skilled intervention: [x] Yes  [] No    Treatment/Activity Tolerance:  [x] Patient able to complete treatment  [] Patient limited by fatigue  [] Patient limited by pain    [] Patient limited by other medical complications  [] Other:     Return to Play: (if applicable)   []  Stage 1: Intro to Strength   []  Stage 2: Return to Run and Strength   []  Stage 3: Return to Jump and Strength   []  Stage 4: Dynamic Strength and Agility   []  Stage 5: Sport Specific Training     []  Ready to Return to Play, Meets All Above Stages   []  Not Ready for Return to Sports   Comments:              PLAN: See eval. Pt to be seen 2 times a week for 12 weeks. [x] Continue per plan of care [] Alter current plan (see comments above)  [] Plan of care initiated [] Hold pending MD visit [] Discharge      Electronically signed by:  Gordon Bowman, PT    Note: If patient does not return for scheduled/ recommended follow up visits, this note will serve as a discharge from care along with most recent update on progress.

## 2022-11-04 ENCOUNTER — HOSPITAL ENCOUNTER (OUTPATIENT)
Dept: PHYSICAL THERAPY | Age: 37
Setting detail: THERAPIES SERIES
Discharge: HOME | End: 2022-11-04
Payer: COMMERCIAL

## 2022-11-04 PROCEDURE — 97016 VASOPNEUMATIC DEVICE THERAPY: CPT

## 2022-11-04 PROCEDURE — 97110 THERAPEUTIC EXERCISES: CPT

## 2022-11-04 PROCEDURE — 97140 MANUAL THERAPY 1/> REGIONS: CPT

## 2022-11-04 NOTE — FLOWSHEET NOTE
[facility], [place], [place] [address], [address]  Phone: [phone]  Fax [phone]        Date:  2022    Patient Name:  Elvis Solares    :  1985  MRN: [id number]  Restrictions/Precautions:    Medical/Treatment Diagnosis Information:  Diagnosis: K24.372Z (ICD-10-CM) - Superior labrum anterior-to-posterior (SLAP) tear of right shoulder  Treatment Diagnosis: M25. 611 R shoulder stiffness, M25.411 R shoulder effusion, M62.81 R shoulder weakness, M25.511 R shoulder pain  Insurance/Certification information:  PT Insurance Information: [facility], 90 visits hard limit, no auth  Physician Information:   Dr. Allegra Reynolds  Has the plan of care been signed (Y/N):        []  Yes  [x]  No     Date of Patient follow up with Physician:  11/10/22      Is this a Progress Report:     []  Yes  [x]  No        If Yes:  Date Range for reporting period:  Beginning:   Ending     Progress report will be due (10 Rx or 30 days whichever is less):        Recertification will be due (POC Duration  / 90 days whichever is less): 22      Visit # Insurance Allowable Auth Required   In-person 11 90 visits (4 used already) []  Yes [x]  No    Telehealth - - []  Yes []  No    Total            Functional Scale: FOTO shoulder score: 36/100 = 36% ability, 64% disability  Date assessed:  22                                  FOTO shoulder score: 59/100 = 59% ability, 41% ability on 10/17/22      Number of Comorbidities:  [x]0     []1-2    []3+    Latex Allergy:  [x]NO      []YES  Preferred Language for Healthcare:   [x]English       []other:      Pain level:  4/10     SUBJECTIVE:   Pt is worried. She feels like her shoulder is getting more stiff and it is now harder to raise her arm than it was a week ago. She tries to not be too active around the house but with two young kids she knows she is probably over doing it.      OBJECTIVE:   Observation: hypomobility R GH posterior and inferior glides, tight and tender with multiple trigger points in deltoid and bicep  Test measurements:       ROM Left AROM Right PROM Right PROM Right  10/7/22 Right  10/14/22 Right  10/17/22 Right  10/24/22   Shoulder Flex 170 35 deg 100 115 125 160 deg 160 deg   Shoulder Abd 175 N/a 70 100 110 134 deg 142 deg   Shoulder ER 65 5 deg 30 46 46 54 deg 70 deg   Shoulder IR T4 N/a    67 deg 67 deg       RESTRICTIONS/PRECAUTIONS: s/p R shoulder SLAP and bicep tenodesis 9/20/22    Exercises/Interventions:     Therapeutic Ex (12414) Sets/Reps/ sec Notes/CUES   Table slides: flex, abd    Wand AAROM: ER 45 degrees [id number] supine   Scap squeeze    PROM elbow flex/ext    Shrugs up/down, fwd/bwd, CW/CCW    Pedulums    ER isometric    IR isometric    Abduction isometric    Pulleys: flex, abd 1X10 ea    Supine flexion AROM 2x10ea    Supine wand ER    S/L ER AROM  S/L ER with abd punch  S/L and supine UE alphabet 2x10  2x10  PT assist at end-range   S/L abduction AROM    Tball on table AAROM flexion & horiz abd/add    Tband scap retract rows & B ext Red 2x10ea    Wall crawls: flex, scaption    Standing wand IR hoiz behind back 2x5x5\"    Standing B ER AROM    Prone scap retract: row,   ext,   horiz abd,   45/90 ER 1# 2x10  1# 2x10  0# 2x10  0# 2x10    Standing wand series: flex, abd, overhead taps, ER, IR horiz behind back x10ea        Pt edu: ADL use of arm    Manual Intervention (66136)     PROM: flexion, abd, IR & ER @ 45 deg and @ 90 deg 10 min    Jt Mobs: posterior & inferior: grad II-III 3 min    STM/Cupping: deltoid and bicep 10 min                   NMR re-education (50855)  CUES NEEDED                                                Therapeutic Activity (21612)                              Game Ready 15 min Min Compression       Therapeutic Exercise and NMR EXR  [x] (11819) Provided verbal/tactile cueing for activities related to strengthening, flexibility, endurance, ROM  for improvements in scapular, scapulothoracic and UE control with self care, reaching, carrying, lifting, house/yardwork, driving/computer work.    [] (87800) Provided verbal/tactile cueing for activities related to improving balance, coordination, kinesthetic sense, posture, motor skill, proprioception  to assist with  scapular, scapulothoracic and UE control with self care, reaching, carrying, lifting, house/yardwork, driving/computer work. Therapeutic Activities:    [] (15193 or 50727) Provided verbal/tactile cueing for activities related to improving balance, coordination, kinesthetic sense, posture, motor skill, proprioception and motor activation to allow for proper function of scapular, scapulothoracic and UE control with self care, carrying, lifting, driving/computer work. Home Exercise Program:    [x] (94744) Reviewed/Progressed HEP activities related to strengthening, flexibility, endurance, ROM of scapular, scapulothoracic and UE control with self care, reaching, carrying, lifting, house/yardwork, driving/computer work  [] (16947) Reviewed/Progressed HEP activities related to improving balance, coordination, kinesthetic sense, posture, motor skill, proprioception of scapular, scapulothoracic and UE control with self care, reaching, carrying, lifting, house/yardwork, driving/computer work      Access Code: [email or web address]  URL: [email or web address]  Date: 11/04/2022  Prepared by: Anthony Mackay    Exercises  Standing Shoulder Flexion AAROM with Dowel - 1 x daily - 7 x weekly - 2-3 sets - 10 reps  Standing Shoulder Abduction AAROM with Dowel - 1 x daily - 7 x weekly - 2-3 sets - 10 reps  Standing Shoulder Flexion AAROM with Dowel - 1 x daily - 7 x weekly - 2-3 sets - 10 reps  Standing Shoulder External Rotation AAROM with Dowel - 1 x daily - 7 x weekly - 2-3 sets - 10 reps  Standing Bilateral Shoulder Internal Rotation AAROM with Dowel - 1 x daily - 7 x weekly - 2-3 sets - 10 reps  Shoulder External Rotation with Anchored Resistance - 1 x daily - 7 x weekly - 2-3 sets - 10 reps  Shoulder Internal Rotation with Resistance - 1 x daily - 7 x weekly - 2-3 sets - 10 reps  Standing Row with Anchored Resistance - 1 x daily - 7 x weekly - 2-3 sets - 10 reps  Standing Row with Resistance with Anchored Resistance at Chest Height Palms Down - 1 x daily - 7 x weekly - 2-3 sets - 10 reps  Shoulder Extension with Resistance - 1 x daily - 7 x weekly - 2-3 sets - 10 reps  Standing Isometric Shoulder External Rotation with Doorway and Towel Roll - 1 x daily - 7 x weekly - 2-3 sets - 10 reps - 5 hold  Shoulder Flexion Wall Slide with Towel - 1 x daily - 7 x weekly - 1 sets - 20 reps  Standing Shoulder Abduction Slides at Wall - 1 x daily - 7 x weekly - 1 sets - 20 reps    Manual Treatments:  PROM / STM / Oscillations-Mobs:  G-I, II, III, IV (PA's, Inf., Post.)  [x] (01376) Provided manual therapy to mobilize soft tissue/joints of cervical/CT, scapular GHJ and UE for the purpose of modulating pain, promoting relaxation,  increasing ROM, reducing/eliminating soft tissue swelling/inflammation/restriction, improving soft tissue extensibility and allowing for proper ROM for normal function with self care, reaching, carrying, lifting, house/yardwork, driving/computer work    Modalities: Game Ready to address inflammation and pain x 15 min    Charges  Timed Code Treatment Minutes: 50   Total Treatment Minutes: 65     [] EVAL (LOW) 18443   [] EVAL (MOD) 87164   [] EVAL (HIGH) 55512   [] RE-EVAL     [x] BT(96629) x 1   [] IONTO  [] NMR (61689) x     [x] VASO  [x] Manual (54137) x 2 [] Other:  [] TA x      [] Mech Traction (00811)  [] ES(attended) (58634)      [] ES (un) (70062):     GOALS:  Patient stated goal: playing overhead volleyball  [] Progressing: [] Met: [] Not Met: [] Adjusted    Therapist goals for Patient:   Short Term Goals: To be achieved in: 2 weeks  1.  Independent in HEP and progression per patient tolerance, in order to prevent re-injury. [] Progressing: [x] Met: [] Not Met: [] Adjusted  2. Patient will have a decrease in pain to facilitate improvement in movement, function, and ADLs as indicated by Functional Deficits. [] Progressing: [x] Met: [] Not Met: [] Adjusted    Long Term Goals: To be achieved in: 12 weeks  1. Disability index score of 10% or better for the FOTO shoulder score to assist with reaching prior level of function. [] Progressing: [] Met: [] Not Met: [] Adjusted  2. Patient will demonstrate increased AROM to WNL and symmetrical to uninvolved limb to allow for proper joint functioning as indicated by patients Functional Deficits. [x] Progressing: [] Met: [] Not Met: [] Adjusted  3. Patient will demonstrate an increase in Strength to 4+/5 to allow for proper functional mobility as indicated by patients Functional Deficits. [] Progressing: [] Met: [x] Not Met: [] Adjusted  4. Patient will be able to lift a 5lbs object overhead with normal scapulohumeral rhythm without increased symptoms or restriction. [] Progressing: [] Met: [x] Not Met: [] Adjusted  5. Pt will be able to perform UE plyometric activity in [place] or CKC without increased symptoms or restriction to assist her in returning to an active lifestyle and PLOF. [] Progressing: [] Met: [x] Not Met: [] Adjusted     Progression Towards Functional goals:  [x] Patient is progressing as expected towards functional goals listed. [] Progression is slowed due to complexities listed. [] Progression has been slowed due to co-morbidities. [] Plan just implemented, too soon to assess goals progression  [] Other:     ASSESSMENT:  Pt's ROM restriction continues but STM and Cupping did help some with more freedom of motion in flexion AROM. However was soreness and had circulatory ecchymosis response to cupping so perform Game ready to help reduce and post-tx soreness.      Overall Progression Towards Functional goals/ Treatment Progress Update:  [x] Patient is progressing as expected towards functional goals listed. [] Progression is slowed due to complexities/Impairments listed. [] Progression has been slowed due to co-morbidities. [] Plan just implemented, too soon to assess goals progression <30days   [] Goals require adjustment due to lack of progress  [] Patient is not progressing as expected and requires additional follow up with physician  [] Other    Prognosis for POC: [x] Good [] Fair  [] Poor      Patient requires continued skilled intervention: [x] Yes  [] No    Treatment/Activity Tolerance:  [x] Patient able to complete treatment  [] Patient limited by fatigue  [] Patient limited by pain    [] Patient limited by other medical complications  [] Other:     Return to Play: (if applicable)   []  Stage 1: Intro to Strength   []  Stage 2: Return to Run and Strength   []  Stage 3: Return to Jump and Strength   []  Stage 4: Dynamic Strength and Agility   []  Stage 5: Sport Specific Training     []  Ready to Return to Play, Meets All Above Stages   []  Not Ready for Return to Sports   Comments:              PLAN: See eval. Pt to be seen 2 times a week for 12 weeks. [x] Continue per plan of care [] Alter current plan (see comments above)  [] Plan of care initiated [] Hold pending MD visit [] Discharge      Electronically signed by:  Akilah Jack PT    Note: If patient does not return for scheduled/ recommended follow up visits, this note will serve as a discharge from care along with most recent update on progress.

## 2022-11-07 ENCOUNTER — HOSPITAL ENCOUNTER (OUTPATIENT)
Dept: PHYSICAL THERAPY | Age: 37
Setting detail: THERAPIES SERIES
Discharge: HOME | End: 2022-11-07
Payer: COMMERCIAL

## 2022-11-07 PROCEDURE — G0283 ELEC STIM OTHER THAN WOUND: HCPCS

## 2022-11-07 PROCEDURE — 97110 THERAPEUTIC EXERCISES: CPT

## 2022-11-07 PROCEDURE — 97140 MANUAL THERAPY 1/> REGIONS: CPT

## 2022-11-07 NOTE — FLOWSHEET NOTE
[facility], [place], [address]  Phone: [phone]  Fax [phone]        Date:  2022    Patient Name:  Princess Garza    :  1985  MRN: [id number]  Restrictions/Precautions:    Medical/Treatment Diagnosis Information:  Diagnosis: O38.170D (ICD-10-CM) - Superior labrum anterior-to-posterior (SLAP) tear of right shoulder  Treatment Diagnosis: M25. 611 R shoulder stiffness, M25.411 R shoulder effusion, M62.81 R shoulder weakness, M25.511 R shoulder pain  Insurance/Certification information:  PT Insurance Information: [facility], 90 visits hard limit, no auth  Physician Information:   Dr. Eva Rainey  Has the plan of care been signed (Y/N):        []  Yes  [x]  No     Date of Patient follow up with Physician:  11/10/22      Is this a Progress Report:     []  Yes  [x]  No        If Yes:  Date Range for reporting period:  Beginning:   Ending     Progress report will be due (10 Rx or 30 days whichever is less):        Recertification will be due (POC Duration  / 90 days whichever is less): 22      Visit # Insurance Allowable Auth Required   In-person 11 90 visits (4 used already) []  Yes [x]  No    Telehealth - - []  Yes []  No    Total            Functional Scale: FOTO shoulder score: 36/100 = 36% ability, 64% disability  Date assessed:  22                                  FOTO shoulder score: 59/100 = 59% ability, 41% ability on 10/17/22      Number of Comorbidities:  [x]0     []1-2    []3+    Latex Allergy:  [x]NO      []YES  Preferred Language for Healthcare:   [x]English       []other:      Pain level:  5/10 (Gets as low as 2)    SUBJECTIVE:   Pt reports that she thinks she is pushing things more by picking up heavier objects.     OBJECTIVE:   Observation: mild hypomobility R GH posterior glide  Test measurements:       ROM Left AROM Right PROM Right PROM Right  10/7/22 Right  10/14/22 Right  10/17/22 Right  10/24/22 R  11/7/22   Shoulder Flex 170 35 deg 100 115 125 160 deg 160 deg 140 (post manual 151)   Shoulder Abd 175 N/a 70 100 110 134 deg 142 deg 125 (post manual 139)   Shoulder ER 65 5 deg 30 46 46 54 deg 70 deg 40 (post manual) 42   Shoulder IR T4 N/a    67 deg 67 deg 67 deg       RESTRICTIONS/PRECAUTIONS: s/p R shoulder SLAP and bicep tenodesis 9/20/22    Exercises/Interventions:     Therapeutic Ex (31973) Sets/Reps/ sec Notes/CUES   Table slides: flex, abd    Wand AAROM: ER 45 degrees [id number] Supine  !    Scap squeeze    PROM elbow flex/ext    Shrugs up/down, fwd/bwd, CW/CCW    Pedulums    ER isometric    IR isometric    Abduction isometric    Pulleys: flex, abd 2X10 ea    Supine flexion AROM 2x10ea    Supine wand ER    S/L ER AROM  S/L ER with abd punch  S/L and supine UE alphabet PT assist at end-range   S/L abduction AROM    Tball on table AAROM flexion & horiz abd/add    Tband scap retract rows & B ext    Wall crawls: flex, scaption    Standing wand IR hoiz behind back 1x5x5\"    Standing B ER AROM    Prone scap retract: row,   ext,   horiz abd,   45/90 ER            Pt edu: ADL use of arm    Manual Intervention (96281)     PROM: flexion, abd, IR & ER @ 45 deg and @ 90 deg 21 min    Jt Mobs: posterior & inferior: grad II-III 2 min                        NMR re-education (40173)  CUES NEEDED                                                Therapeutic Activity (82517)                         IFC 15 min    Game Ready 15 min Min Compression       Therapeutic Exercise and NMR EXR  [x] (01968) Provided verbal/tactile cueing for activities related to strengthening, flexibility, endurance, ROM  for improvements in scapular, scapulothoracic and UE control with self care, reaching, carrying, lifting, house/yardwork, driving/computer work.    [] (81328) Provided verbal/tactile cueing for activities related to improving balance, coordination, kinesthetic sense, posture, motor skill, proprioception to assist with  scapular, scapulothoracic and UE control with self care, reaching, carrying, lifting, house/yardwork, driving/computer work. Therapeutic Activities:    [] (28527 or 81880) Provided verbal/tactile cueing for activities related to improving balance, coordination, kinesthetic sense, posture, motor skill, proprioception and motor activation to allow for proper function of scapular, scapulothoracic and UE control with self care, carrying, lifting, driving/computer work. Home Exercise Program:    [x] (55773) Reviewed/Progressed HEP activities related to strengthening, flexibility, endurance, ROM of scapular, scapulothoracic and UE control with self care, reaching, carrying, lifting, house/yardwork, driving/computer work  [x] (60986) Reviewed/Progressed HEP activities related to improving balance, coordination, kinesthetic sense, posture, motor skill, proprioception of scapular, scapulothoracic and UE control with self care, reaching, carrying, lifting, house/yardwork, driving/computer work      Access Code: [email or web address]  URL: [email or web address]  Date: 10/24/2022  Prepared by: Bev Banks    Exercises  Standing Shoulder Flexion Wall Walk - 1 x daily - 7 x weekly - 3 sets - 10 reps  Standing Shoulder Scaption Wall Walk - 1 x daily - 7 x weekly - 3 sets - 10 reps  Supine Shoulder Flexion Extension Full Range AROM - 1 x daily - 7 x weekly - 3 sets - 10 reps  Supine Shoulder Alphabet - 1 x daily - 7 x weekly - 3 sets - 10 reps  Sidelying Shoulder External Rotation - 1 x daily - 7 x weekly - 3 sets - 10 reps  Sidelying Shoulder Abduction Palm Forward - 1 x daily - 7 x weekly - 3 sets - 10 reps  Shoulder External Rotation and Scapular Retraction - 1 x daily - 7 x weekly - 3 sets - 10 reps  10/31/22: added ER ROM with cane 45 degrees; IR BB across to spine      Manual Treatments:  PROM / STM / Oscillations-Mobs:  G-I, II, III, IV (PA's, Inf., Post.)  [x] (33398) Provided manual therapy to mobilize soft tissue/joints of cervical/CT, scapular GHJ and UE for the purpose of modulating pain, promoting relaxation,  increasing ROM, reducing/eliminating soft tissue swelling/inflammation/restriction, improving soft tissue extensibility and allowing for proper ROM for normal function with self care, reaching, carrying, lifting, house/yardwork, driving/computer work    Modalities: Game Ready with IFC to address inflammation and pain x 15 min    Charges  Timed Code Treatment Minutes: 50   Total Treatment Minutes: 65     [] EVAL (LOW) 25848   [] EVAL (MOD) 38011   [] EVAL (HIGH) 23488   [] RE-EVAL     [x] KJ(08256) x 1  [] IONTO  [] NMR (99716) x     [] VASO  [x] Manual (81052) x  2 [] Other:  [] TA x      [] Mech Traction (02476)  [] ES(attended) (82013)      [x] ES (un) (57459):     GOALS:  Patient stated goal: playing overhead volleyball  [] Progressing: [] Met: [] Not Met: [] Adjusted    Therapist goals for Patient:   Short Term Goals: To be achieved in: 2 weeks  1. Independent in HEP and progression per patient tolerance, in order to prevent re-injury. [] Progressing: [x] Met: [] Not Met: [] Adjusted  2. Patient will have a decrease in pain to facilitate improvement in movement, function, and ADLs as indicated by Functional Deficits. [] Progressing: [x] Met: [] Not Met: [] Adjusted    Long Term Goals: To be achieved in: 12 weeks  1. Disability index score of 10% or better for the FOTO shoulder score to assist with reaching prior level of function. [] Progressing: [] Met: [] Not Met: [] Adjusted  2. Patient will demonstrate increased AROM to WNL and symmetrical to uninvolved limb to allow for proper joint functioning as indicated by patients Functional Deficits. [x] Progressing: [] Met: [] Not Met: [] Adjusted  3. Patient will demonstrate an increase in Strength to 4+/5 to allow for proper functional mobility as indicated by patients Functional Deficits. [] Progressing: [] Met: [x] Not Met: [] Adjusted  4. Patient will be able to lift a 5lbs object overhead with normal scapulohumeral rhythm without increased symptoms or restriction. [] Progressing: [] Met: [x] Not Met: [] Adjusted  5. Pt will be able to perform UE plyometric activity in [place] or CKC without increased symptoms or restriction to assist her in returning to an active lifestyle and PLOF. [] Progressing: [] Met: [x] Not Met: [] Adjusted     Progression Towards Functional goals:  [x] Patient is progressing as expected towards functional goals listed. [] Progression is slowed due to complexities listed. [] Progression has been slowed due to co-morbidities. [] Plan just implemented, too soon to assess goals progression  [] Other:     ASSESSMENT:  Pt has joint stiffness and decreased ROM today BUT DID HAVE SOME IMPROVEMENT AFTER MANUAL WORK BUT STILL NOT BACK TO PREVIOUS ROM AMOUNTS. Les Pimple Poor AROM due to decreased strength and joint stiffness. VCs for exercise technique. Overall Progression Towards Functional goals/ Treatment Progress Update:  [x] Patient is progressing as expected towards functional goals listed. [] Progression is slowed due to complexities/Impairments listed. [] Progression has been slowed due to co-morbidities.   [] Plan just implemented, too soon to assess goals progression <30days   [] Goals require adjustment due to lack of progress  [] Patient is not progressing as expected and requires additional follow up with physician  [] Other    Prognosis for POC: [x] Good [] Fair  [] Poor      Patient requires continued skilled intervention: [x] Yes  [] No    Treatment/Activity Tolerance:  [] Patient able to complete treatment  [] Patient limited by fatigue  [x] Patient limited by pain    [] Patient limited by other medical complications  [] Other:     Return to Play: (if applicable)   []  Stage 1: Intro to Strength   []  Stage 2: Return to Run and Strength   []  Stage 3: Return to Jump and Strength   []  Stage 4: Dynamic Strength and Agility   []  Stage 5: Sport Specific Training     []  Ready to Return to Play, Meets All Above Stages   []  Not Ready for Return to Sports   Comments:              PLAN: See eval. Pt to be seen 2 times a week for 12 weeks. [x] Continue per plan of care [] Alter current plan (see comments above)  [] Plan of care initiated [] Hold pending MD visit [] Discharge      Electronically signed by:  Percy Padilla PT    Note: If patient does not return for scheduled/ recommended follow up visits, this note will serve as a discharge from care along with most recent update on progress.

## 2022-11-09 ENCOUNTER — HOSPITAL ENCOUNTER (OUTPATIENT)
Dept: PHYSICAL THERAPY | Age: 37
Setting detail: THERAPIES SERIES
Discharge: HOME | End: 2022-11-09
Payer: COMMERCIAL

## 2022-11-09 PROCEDURE — 97110 THERAPEUTIC EXERCISES: CPT

## 2022-11-09 PROCEDURE — G0283 ELEC STIM OTHER THAN WOUND: HCPCS

## 2022-11-09 PROCEDURE — 97140 MANUAL THERAPY 1/> REGIONS: CPT

## 2022-11-09 PROCEDURE — 97016 VASOPNEUMATIC DEVICE THERAPY: CPT

## 2022-11-09 NOTE — FLOWSHEET NOTE
The [address] [facility], [address]  Phone: [phone]  Fax [phone]        Date:  2022    Patient Name:  Brendon Thorpe    :  1985  MRN: [id number]  Restrictions/Precautions:    Medical/Treatment Diagnosis Information:  Diagnosis: P57.618V (ICD-10-CM) - Superior labrum anterior-to-posterior (SLAP) tear of right shoulder  Treatment Diagnosis: M25. 611 R shoulder stiffness, M25.411 R shoulder effusion, M62.81 R shoulder weakness, M25.511 R shoulder pain  Insurance/Certification information:  PT Insurance Information: [facility], 90 visits hard limit, no auth  Physician Information:   Dr. Brigette Quiñones  Has the plan of care been signed (Y/N):        []  Yes  [x]  No     Date of Patient follow up with Physician:  11/10/22      Is this a Progress Report:     []  Yes  [x]  No        If Yes:  Date Range for reporting period:  Beginning:   Ending     Progress report will be due (10 Rx or 30 days whichever is less):        Recertification will be due (POC Duration  / 90 days whichever is less): 22      Visit # Insurance Allowable Auth Required   In-person 12 90 visits (4 used already) []  Yes [x]  No    Telehealth - - []  Yes []  No    Total            Functional Scale: FOTO shoulder score: 36/100 = 36% ability, 64% disability  Date assessed:  22                                  FOTO shoulder score: 59/100 = 59% ability, 41% ability on 10/17/22      Number of Comorbidities:  [x]0     []1-2    []3+    Latex Allergy:  [x]NO      []YES  Preferred Language for Healthcare:   [x]English       []other:      Pain level:  1/10     SUBJECTIVE:   It feels better when I put pressure on it.     OBJECTIVE:   Observation: post glide GHJ norm mobillity  Test measurements:       ROM Left AROM Right PROM Right PROM Right  10/7/22 Right  10/14/22 Right  10/17/22 Right  10/24/22 R  22 Right  22   Shoulder Flex 170 35 deg 100 115 125 160 deg 160 deg 140 (post manual 151) 141° pre,  161° post    Shoulder Abd 175 N/a 70 100 110 134 deg 142 deg 125 (post manual 139) 124 ° pre,   157° post   Shoulder ER 65 5 deg 30 46 46 54 deg 70 deg 40 (post manual) 42 35°pre  75° post   Shoulder IR T4 N/a    67 deg 67 deg 67 deg        RESTRICTIONS/PRECAUTIONS: s/p R shoulder SLAP and bicep tenodesis 9/20/22    Exercises/Interventions:     Therapeutic Ex (43394) Sets/Reps/ sec Notes/CUES   Table slides: flex, abd    Wand AAROM: ER 45 degrees Supine     Scap squeeze    PROM elbow flex/ext    Shrugs up/down, fwd/bwd, CW/CCW    Pedulums    ER isometric    IR isometric    Abduction isometric    Pulleys: flex, abd    Supine flexion AROM 2x10ea with 1 lb weight    Supine wand ER    S/L ER AROM  S/L ER with abd punch  S/L and supine UE alphabet 2x10     S/L abduction AROM 2x10 1 lb    Tball on table AAROM flexion & horiz abd/add    Tband scap retract rows & B ext Red 2x10ea    Wall crawls: flex, scaption    Standing wand IR hoiz behind back    Standing B ER AROM    Prone scap retract: row,   ext,   horiz abd,   45/90 ER            Pt edu: ADL use of arm    Manual Intervention (77248)     PROM: flexion, abd, IR & ER @ 45 deg and @ 90 deg 25 min    Jt Mobs: posterior & inferior: grad II-III 2 min                        NMR re-education (55249)  CUES NEEDED                                                Therapeutic Activity (21891)                        IFC    Game Ready 15 min Min Compression       Therapeutic Exercise and NMR EXR  [x] (89069) Provided verbal/tactile cueing for activities related to strengthening, flexibility, endurance, ROM  for improvements in scapular, scapulothoracic and UE control with self care, reaching, carrying, lifting, house/yardwork, driving/computer work.    [] (62462) Provided verbal/tactile cueing for activities related to improving balance, coordination, kinesthetic sense, posture, motor skill, proprioception  to assist with  scapular, scapulothoracic and UE control with self care, reaching, carrying, lifting, house/yardwork, driving/computer work. Therapeutic Activities:    [] (14217 or 36279) Provided verbal/tactile cueing for activities related to improving balance, coordination, kinesthetic sense, posture, motor skill, proprioception and motor activation to allow for proper function of scapular, scapulothoracic and UE control with self care, carrying, lifting, driving/computer work. Home Exercise Program:    [x] (53512) Reviewed/Progressed HEP activities related to strengthening, flexibility, endurance, ROM of scapular, scapulothoracic and UE control with self care, reaching, carrying, lifting, house/yardwork, driving/computer work  [x] (84681) Reviewed/Progressed HEP activities related to improving balance, coordination, kinesthetic sense, posture, motor skill, proprioception of scapular, scapulothoracic and UE control with self care, reaching, carrying, lifting, house/yardwork, driving/computer work      Access Code: [email or web address]  URL: [email or web address]  Date: 10/24/2022  Prepared by: Adalid Kee    Exercises  Standing Shoulder Flexion Wall Walk - 1 x daily - 7 x weekly - 3 sets - 10 reps  Standing Shoulder Scaption Wall Walk - 1 x daily - 7 x weekly - 3 sets - 10 reps  Supine Shoulder Flexion Extension Full Range AROM - 1 x daily - 7 x weekly - 3 sets - 10 reps  Supine Shoulder Alphabet - 1 x daily - 7 x weekly - 3 sets - 10 reps  Sidelying Shoulder External Rotation - 1 x daily - 7 x weekly - 3 sets - 10 reps  Sidelying Shoulder Abduction Palm Forward - 1 x daily - 7 x weekly - 3 sets - 10 reps  Shoulder External Rotation and Scapular Retraction - 1 x daily - 7 x weekly - 3 sets - 10 reps  10/31/22: added ER ROM with cane 45 degrees; IR BB across to spine      Manual Treatments:  PROM / STM / Oscillations-Mobs:  G-I, II, III, IV (PA's, Inf., Post.)  [x] (11945) Provided manual therapy to mobilize soft tissue/joints of cervical/CT, scapular GHJ and UE for the purpose of modulating pain, promoting relaxation,  increasing ROM, reducing/eliminating soft tissue swelling/inflammation/restriction, improving soft tissue extensibility and allowing for proper ROM for normal function with self care, reaching, carrying, lifting, house/yardwork, driving/computer work    Modalities: Game Ready with IFC to address inflammation and pain x 15 min    Charges  Timed Code Treatment Minutes: 60   Total Treatment Minutes: 75     [] EVAL (LOW) 20765   [] EVAL (MOD) 36986   [] EVAL (HIGH) 44941   [] RE-EVAL     [x] WQ(35353) x 2  [] IONTO  [] NMR (35961) x     [] VASO  [x] Manual (09735) x  2 [] Other:  [] TA x      [] Mech Traction (68444)  [] ES(attended) (57364)      [x] ES (un) (37273):     GOALS:  Patient stated goal: playing overhead volleyball  [] Progressing: [] Met: [] Not Met: [] Adjusted    Therapist goals for Patient:   Short Term Goals: To be achieved in: 2 weeks  1. Independent in HEP and progression per patient tolerance, in order to prevent re-injury. [] Progressing: [x] Met: [] Not Met: [] Adjusted  2. Patient will have a decrease in pain to facilitate improvement in movement, function, and ADLs as indicated by Functional Deficits. [] Progressing: [x] Met: [] Not Met: [] Adjusted    Long Term Goals: To be achieved in: 12 weeks  1. Disability index score of 10% or better for the FOTO shoulder score to assist with reaching prior level of function. [] Progressing: [] Met: [] Not Met: [] Adjusted  2. Patient will demonstrate increased AROM to WNL and symmetrical to uninvolved limb to allow for proper joint functioning as indicated by patients Functional Deficits. [x] Progressing: [] Met: [] Not Met: [] Adjusted  3. Patient will demonstrate an increase in Strength to 4+/5 to allow for proper functional mobility as indicated by patients Functional Deficits. [] Progressing: [] Met: [x] Not Met: [] Adjusted  4.  Patient will be able to lift a 5lbs object overhead with normal scapulohumeral rhythm without increased symptoms or restriction. [] Progressing: [] Met: [x] Not Met: [] Adjusted  5. Pt will be able to perform UE plyometric activity in [place] or [facility] without increased symptoms or restriction to assist her in returning to an active lifestyle and PLOF. [] Progressing: [] Met: [x] Not Met: [] Adjusted     Progression Towards Functional goals:  [x] Patient is progressing as expected towards functional goals listed. [] Progression is slowed due to complexities listed. [] Progression has been slowed due to co-morbidities. [] Plan just implemented, too soon to assess goals progression  [] Other:     ASSESSMENT:  Pt returned back to ROM prior to last Monday's number with a few degree improvements. Pt responded well to PROM and mobs. By end of manual therapy session pt's PROM was back to previous amounts if not even slightly improved. Overall the shoulder was less tight and sore. Pt still fatigues with exercises and compensates at end range abd with scap ROM vs GHJ. Overall Progression Towards Functional goals/ Treatment Progress Update:  [x] Patient is progressing as expected towards functional goals listed. [] Progression is slowed due to complexities/Impairments listed. [] Progression has been slowed due to co-morbidities.   [] Plan just implemented, too soon to assess goals progression <30days   [] Goals require adjustment due to lack of progress  [] Patient is not progressing as expected and requires additional follow up with physician  [] Other    Prognosis for POC: [x] Good [] Fair  [] Poor      Patient requires continued skilled intervention: [x] Yes  [] No    Treatment/Activity Tolerance:  [x] Patient able to complete treatment  [] Patient limited by fatigue  [x] Patient limited by pain    [] Patient limited by other medical complications  [] Other:     Return to Play: (if applicable)   []  Stage 1: Intro to Strength   []  Stage 2: Return to Run and Strength   []  Stage 3: Return to Jump and Strength   []  Stage 4: Dynamic Strength and Agility   []  Stage 5: Sport Specific Training     []  Ready to Return to Play, Meets All Above Stages   []  Not Ready for Return to Sports   Comments:              PLAN: See eval. Pt to be seen 2 times a week for 12 weeks. [x] Continue per plan of care [] Alter current plan (see comments above)  [] Plan of care initiated [] Hold pending MD visit [] Discharge      Electronically signed by:  ROBINSON Minaya, [facility] Therapist was present, directed the patient's care, made skilled judgement, and was responsible for assessment and treatment of the patient. Note: If patient does not return for scheduled/ recommended follow up visits, this note will serve as a discharge from care along with most recent update on progress.

## 2022-11-10 ENCOUNTER — OFFICE VISIT (OUTPATIENT)
Dept: ORTHOPEDIC SURGERY | Age: 37
End: 2022-11-10

## 2022-11-10 VITALS — HEIGHT: 67 IN | BODY MASS INDEX: 23.7 KG/M2 | WEIGHT: 151 LBS

## 2022-11-10 DIAGNOSIS — Z98.890 STATUS POST ARTHROSCOPY OF RIGHT SHOULDER: Primary | ICD-10-CM

## 2022-11-10 PROCEDURE — 99024 POSTOP FOLLOW-UP VISIT: CPT | Performed by: ORTHOPAEDIC SURGERY

## 2022-11-10 NOTE — PROGRESS NOTES
Chief Complaint    Shoulder Pain (POST OP RIGHT SHOULDER)      History of Present Illness: Rod Lewis is a pleasant, 30 y.o., female, here today for follow up of right shoulder. She is status post right shoulder arthroscopic extensive debridement subacromial bursa, bursal sided effusions as well as superior labrum and rotator interval, open subpectoral biceps tenodesis on 9/20/2022. She has continued in physical therapy at [facility]. Overall she is doing well. She states that she is progressing with physical therapy. The pain is well controlled and she has no concerns with the incisions. She reports no new injuries or setbacks. Pain Assessment  Location of Pain: Shoulder  Location Modifiers: Right  Severity of Pain: 0  Quality of Pain: Other (Comment) (tightness)  Limiting Behavior: Some  Relieving Factors: Rest, Exercise  Work-Related Injury: No  Are there other pain locations you wish to document?: No      Medical History:  Patient's medications, allergies, past medical, surgical, social and family histories were reviewed and updated as appropriate. No notes on file    Review of Systems  A 14 point review of systems was completed by the patient and is available in the media section of the scanned medical record and was reviewed on 11/10/2022. The review is negative with the exception of those things mentioned in the HPI and Past Medical History    Vital Signs: There were no vitals filed for this visit. General/Appearance: Alert and oriented and in no apparent distress. Skin:  There are no skin lesions, cellulitis, or extreme edema. The patient has warm and well-perfused Bilateral upper extremities with brisk capillary refill. Right shoulder Exam:  Inspection:  No gross deformities, no signs of infection. Incisional sites nontender to palpation    Active Range of Motion: Forward Elevation 140, Abduction 130, External Rotation 30, Internal Rotation L1    Strength:  Full strength to rotator cuff musculature    Special tests: No Cade deformity    Neurovascular: Sensation to light touch is intact, no motor deficits, palpable radial pulses 2+      Radiology:     No new XR obtained at this time. Assessment :  Ms. Gloria Lui is a pleasant, 30 y.o. patient who is month postop rotator interval, subacromial debridement with open biceps tenodesis 9/20/2022      Impression:  Encounter Diagnosis   Name Primary? Status post arthroscopy of right shoulder Yes       Office Procedures:  Orders Placed This Encounter   Procedures    [address] [place] [address] (Ortho & Sports)-[facility]     Referral Priority:   Routine     Referral Type:   Eval and Treat     Referral Reason:   Specialty Services Required     Requested Specialty:   Physical Therapist     Number of Visits Requested:   1           Treatment Plan:   We discussed with Gloria Lui her prognosis status post right shoulder arthroscopy with debridement and open biceps tenodesis 9/20/2022. The patient continues to make improvements with physical therapy. She will continue to do her therapies as scheduled and at home throughout the week. We will see her back in 4 to 6 weeks for repeat evaluation. We will see Kaykay Mancilla back in 4-6 weeks and/or as needed. All questions were answered to patient's satisfaction and She was encouraged to call with any further questions or concerns. Keyla Peralta is in agreement with this plan. 11/10/2022  4:54 PM    Ligia Malodnado D.O. Orthopedic Surgeon, [facility] Fellow    The encounter with the patient was supervised by Dr. Shimon Owen who personally examined the patient and reviewed the plan. This dictation was performed with a verbal recognition program (DRAGON) and it was checked for errors. It is possible that there are still dictated errors within this office note. If so, please bring any errors to my attention for an addendum.   All efforts were made to ensure that this office note is

## 2022-11-16 ENCOUNTER — HOSPITAL ENCOUNTER (OUTPATIENT)
Dept: PHYSICAL THERAPY | Age: 37
Setting detail: THERAPIES SERIES
Discharge: HOME | End: 2022-11-16
Payer: COMMERCIAL

## 2022-11-16 PROCEDURE — 97110 THERAPEUTIC EXERCISES: CPT

## 2022-11-16 PROCEDURE — 97140 MANUAL THERAPY 1/> REGIONS: CPT

## 2022-11-16 PROCEDURE — 97112 NEUROMUSCULAR REEDUCATION: CPT

## 2022-11-16 PROCEDURE — 97016 VASOPNEUMATIC DEVICE THERAPY: CPT

## 2022-11-16 NOTE — FLOWSHEET NOTE
The [address] [place] [address],[address], [address], [place] [facility], [address]  Phone: [phone]  Fax [phone]        Date:  2022    Patient Name:  Brendon Thorpe    :  1985  MRN: [id number]  Restrictions/Precautions:    Medical/Treatment Diagnosis Information:  Diagnosis: U58.384C (ICD-10-CM) - Superior labrum anterior-to-posterior (SLAP) tear of right shoulder  Treatment Diagnosis: M25. 611 R shoulder stiffness, M25.411 R shoulder effusion, M62.81 R shoulder weakness, M25.511 R shoulder pain  Insurance/Certification information:  PT Insurance Information: [facility], 90 visits hard limit, no auth  Physician Information:   Dr. Brigette Quiñones  Has the plan of care been signed (Y/N):        []  Yes  [x]  No     Date of Patient follow up with Physician:  11/10/22      Is this a Progress Report:     []  Yes  [x]  No        If Yes:  Date Range for reporting period:  Beginning:   Ending     Progress report will be due (10 Rx or 30 days whichever is less):        Recertification will be due (POC Duration  / 90 days whichever is less): 22      Visit # Insurance Allowable Auth Required   In-person 13 90 visits (4 used already) []  Yes [x]  No    Telehealth - - []  Yes []  No    Total            Functional Scale: FOTO shoulder score: 36/100 = 36% ability, 64% disability  Date assessed:  22                                  FOTO shoulder score: 59/100 = 59% ability, 41% ability on 10/17/22      Number of Comorbidities:  [x]0     []1-2    []3+    Latex Allergy:  [x]NO      []YES  Preferred Language for Healthcare:   [x]English       []other:      Pain level: 1 /10     SUBJECTIVE:   It is more painful at night if I sleep on it wrong. It is more tightness than pain. HEP is going okay, She had f/u with Dr. Brigette Quiñones and he said he is not worried about frozen shoulder.      OBJECTIVE:   Observation: post glide GHJ norm mobillity  Test measurements:       ROM Left AROM Right PROM Right PROM Right  10/24/22 R  11/7/22 Right  11/9/22 Right 11/16  All post-manual   Shoulder Flex 170 35 deg 100 160 deg 140 (post manual 151) 141° pre,  161° post  168°   Shoulder Abd 175 N/a 70 142 deg 125 (post manual 139) 124 ° pre,   157° post 146°   Shoulder ER 65 5 deg 30 70 deg 40 (post manual) 42 35°pre  75° post 43° (painful pinch)   Shoulder IR T4 N/a  67 deg 67 deg         RESTRICTIONS/PRECAUTIONS: s/p R shoulder SLAP and bicep tenodesis 9/20/22    Exercises/Interventions:     Therapeutic Ex (99897) Sets/Reps/ sec Notes/CUES   Wand AAROM: ER 45 degrees Supine     Pulleys: flex, abd    Supine flexion AROM    Supine wand ER    S/L ER AROM  S/L ER with abd punch  S/L and supine UE alphabet    S/L abduction AROM    Tball on table AAROM flexion & horiz abd/add    Tband scap retract rows & B ext Red 2x10ea    Wall crawls: flex, scaption    Standing wand IR hoiz behind back    Standing B ER AROM    Prone scap retract: row,   ext,   horiz abd,   45/90 ER    Resisted ROM PNF D2 supine (~50%) 5x resisted (10x AROM)    Ball on plinth CW, CCW 10x    Supine pec stretch over towel roll NV    Supine flexion  Supine SA upper cut NV  NV    Arm alphabet: supine & S/L NV    S/L abd NV    Progress HEP NV        Pt edu: ADL use of arm    Manual Intervention (72945)    PROM: flexion, abd, IR & ER @ 45 deg and @ 90 deg    Jt Mobs: posterior & inferior: grad II-III 8 min    Pec minor stretch NV                NMR re-education (65573) CUES NEEDED   Lat pull down stool sit 10x2 15lbs Cues to prep with scap then arms, reverse on release   Supine 90° shoulder flexion  perturbations 1 min (predictable and random) x2                                Therapeutic Activity (74793)                    IFC    Game Ready 15 min Min Compression       Therapeutic Exercise and NMR EXR  [x] (86889) Provided verbal/tactile cueing for activities related to strengthening, flexibility, endurance, ROM  for improvements in scapular, scapulothoracic and UE control with self care, reaching, carrying, lifting, house/yardwork, driving/computer work. [x] (77839) Provided verbal/tactile cueing for activities related to improving balance, coordination, kinesthetic sense, posture, motor skill, proprioception  to assist with  scapular, scapulothoracic and UE control with self care, reaching, carrying, lifting, house/yardwork, driving/computer work. Therapeutic Activities:    [] (92343 or 61213) Provided verbal/tactile cueing for activities related to improving balance, coordination, kinesthetic sense, posture, motor skill, proprioception and motor activation to allow for proper function of scapular, scapulothoracic and UE control with self care, carrying, lifting, driving/computer work. Home Exercise Program:    [] (48743) Reviewed/Progressed HEP activities related to strengthening, flexibility, endurance, ROM of scapular, scapulothoracic and UE control with self care, reaching, carrying, lifting, house/yardwork, driving/computer work  [] (18073) Reviewed/Progressed HEP activities related to improving balance, coordination, kinesthetic sense, posture, motor skill, proprioception of scapular, scapulothoracic and UE control with self care, reaching, carrying, lifting, house/yardwork, driving/computer work      Access Code: [email or web address]  URL: [email or web address]  Date: 10/24/2022  Prepared by: Jimenez Range    Exercises  Standing Shoulder Flexion Wall Walk - 1 x daily - 7 x weekly - 3 sets - 10 reps  Standing Shoulder Scaption Wall Walk - 1 x daily - 7 x weekly - 3 sets - 10 reps  Supine Shoulder Flexion Extension Full Range AROM - 1 x daily - 7 x weekly - 3 sets - 10 reps  Supine Shoulder Alphabet - 1 x daily - 7 x weekly - 3 sets - 10 reps  Sidelying Shoulder External Rotation - 1 x daily - 7 x weekly - 3 sets - 10 reps  Sidelying Shoulder Abduction Palm Forward - 1 x daily - 7 x weekly - 3 sets - 10 reps  Shoulder External Rotation and Scapular Retraction - 1 x daily - 7 x weekly - 3 sets - 10 reps  10/31/22: added ER ROM with cane 45 degrees; IR BB across to spine      Manual Treatments:  PROM / STM / Oscillations-Mobs:  G-I, II, III, IV (PA's, Inf., Post.)  [x] (48984) Provided manual therapy to mobilize soft tissue/joints of cervical/CT, scapular GHJ and UE for the purpose of modulating pain, promoting relaxation,  increasing ROM, reducing/eliminating soft tissue swelling/inflammation/restriction, improving soft tissue extensibility and allowing for proper ROM for normal function with self care, reaching, carrying, lifting, house/yardwork, driving/computer work    Modalities: Game Ready with IFC to address inflammation and pain x 15 min    Charges  Timed Code Treatment Minutes: 45   Total Treatment Minutes: 60     [] EVAL (LOW) 78057   [] EVAL (MOD) 63892   [] EVAL (HIGH) 07986   [] RE-EVAL     [x] QE(04061) x  1 [] IONTO  [x] NMR (61008) x    1 [x] VASO  [x] Manual (81401) x  1 [] Other:  [] TA x      [] Mech Traction (63646)  [] ES(attended) (05635)      [] ES (un) (03497):     GOALS:  Patient stated goal: playing overhead volleyball  [] Progressing: [] Met: [] Not Met: [] Adjusted    Therapist goals for Patient:   Short Term Goals: To be achieved in: 2 weeks  1. Independent in HEP and progression per patient tolerance, in order to prevent re-injury. [] Progressing: [x] Met: [] Not Met: [] Adjusted  2. Patient will have a decrease in pain to facilitate improvement in movement, function, and ADLs as indicated by Functional Deficits. [] Progressing: [x] Met: [] Not Met: [] Adjusted    Long Term Goals: To be achieved in: 12 weeks  1. Disability index score of 10% or better for the FOTO shoulder score to assist with reaching prior level of function. [] Progressing: [] Met: [] Not Met: [] Adjusted  2.  Patient will demonstrate increased AROM to WNL and symmetrical to uninvolved limb to allow for proper joint functioning as indicated by patients Functional Deficits. [x] Progressing: [] Met: [] Not Met: [] Adjusted  3. Patient will demonstrate an increase in Strength to 4+/5 to allow for proper functional mobility as indicated by patients Functional Deficits. [] Progressing: [] Met: [x] Not Met: [] Adjusted  4. Patient will be able to lift a 5lbs object overhead with normal scapulohumeral rhythm without increased symptoms or restriction. [] Progressing: [] Met: [x] Not Met: [] Adjusted  5. Pt will be able to perform UE plyometric activity in OKC or CKC without increased symptoms or restriction to assist her in returning to an active lifestyle and PLOF. [] Progressing: [] Met: [x] Not Met: [] Adjusted     Progression Towards Functional goals:  [x] Patient is progressing as expected towards functional goals listed. [] Progression is slowed due to complexities listed. [] Progression has been slowed due to co-morbidities. [] Plan just implemented, too soon to assess goals progression  [] Other:     ASSESSMENT:  Pt feels a painful pinch when moving into ER sometimes. Shaking it out relieves it. Reviewed pt's HEP and added in more OKC and CKC dynamic stabilization exercises. Overall Progression Towards Functional goals/ Treatment Progress Update:  [x] Patient is progressing as expected towards functional goals listed. [] Progression is slowed due to complexities/Impairments listed. [] Progression has been slowed due to co-morbidities.   [] Plan just implemented, too soon to assess goals progression <30days   [] Goals require adjustment due to lack of progress  [] Patient is not progressing as expected and requires additional follow up with physician  [] Other    Prognosis for POC: [x] Good [] Fair  [] Poor      Patient requires continued skilled intervention: [x] Yes  [] No    Treatment/Activity Tolerance:  [x] Patient able to complete treatment  [] Patient limited by fatigue  [x] Patient limited by pain    [] Patient limited by other medical complications  [] Other:     Return to Play: (if applicable)   []  Stage 1: Intro to Strength   []  Stage 2: Return to Run and Strength   []  Stage 3: Return to Jump and Strength   []  Stage 4: Dynamic Strength and Agility   []  Stage 5: Sport Specific Training     []  Ready to Return to Play, Meets All Above Stages   []  Not Ready for Return to Sports   Comments:              PLAN: See eval. Pt to be seen 2 times a week for 12 weeks. [x] Continue per plan of care [] Alter current plan (see comments above)  [] Plan of care initiated [] Hold pending MD visit [] Discharge      Electronically signed by:  Lynnette Lopez PT  Efra Gutierrez [facility] Therapist was present, directed the patient's care, made skilled judgement, and was responsible for assessment and treatment of the patient. Note: If patient does not return for scheduled/ recommended follow up visits, this note will serve as a discharge from care along with most recent update on progress.

## 2022-11-18 ENCOUNTER — HOSPITAL ENCOUNTER (OUTPATIENT)
Dept: PHYSICAL THERAPY | Age: 37
Setting detail: THERAPIES SERIES
Discharge: HOME | End: 2022-11-18
Payer: COMMERCIAL

## 2022-11-18 PROCEDURE — 97140 MANUAL THERAPY 1/> REGIONS: CPT

## 2022-11-18 PROCEDURE — 97110 THERAPEUTIC EXERCISES: CPT

## 2022-11-18 PROCEDURE — 97112 NEUROMUSCULAR REEDUCATION: CPT

## 2022-11-18 PROCEDURE — 97016 VASOPNEUMATIC DEVICE THERAPY: CPT

## 2022-11-18 NOTE — FLOWSHEET NOTE
The [place] [address], [address], [address]  Phone: [phone]  Fax [phone]        Date:  2022    Patient Name:  Quinn Ahumada    :  1985  MRN: [id number]  Restrictions/Precautions:    Medical/Treatment Diagnosis Information:  Diagnosis: J23.878T (ICD-10-CM) - Superior labrum anterior-to-posterior (SLAP) tear of right shoulder  Treatment Diagnosis: M25. 611 R shoulder stiffness, M25.411 R shoulder effusion, M62.81 R shoulder weakness, M25.511 R shoulder pain  Insurance/Certification information:  PT Insurance Information: [facility], 90 visits hard limit, no auth  Physician Information:   Dr. Urban Christy  Has the plan of care been signed (Y/N):        []  Yes  [x]  No     Date of Patient follow up with Physician:  11/10/22      Is this a Progress Report:     []  Yes  [x]  No        If Yes:  Date Range for reporting period:  Beginning:   Ending     Progress report will be due (10 Rx or 30 days whichever is less):        Recertification will be due (POC Duration  / 90 days whichever is less): 22      Visit # Insurance Allowable Auth Required   In-person 14 90 visits (4 used already) []  Yes [x]  No    Telehealth - - []  Yes []  No    Total            Functional Scale: FOTO shoulder score: 36/100 = 36% ability, 64% disability  Date assessed:  22                                  FOTO shoulder score: 59/100 = 59% ability, 41% ability on 10/17/22      Number of Comorbidities:  [x]0     []1-2    []3+    Latex Allergy:  [x]NO      []YES  Preferred Language for Healthcare:   [x]English       []other:      Pain level: 1 /10     SUBJECTIVE:   It is never really pain it is just tightness. I had difficulty reaching all the way up to my curtain francisca the other day.     OBJECTIVE:   Observation: post glide GHJ norm mobillity  Test measurements:       ROM Left AROM Right PROM Right PROM Right  10/24/22 R  22 Right  11/9/22 Right 11/16  All post-manual   Shoulder Flex 170 35 deg 100 160 deg 140 (post manual 151) 141° pre,  161° post  168°   Shoulder Abd 175 N/a 70 142 deg 125 (post manual 139) 124 ° pre,   157° post 146°   Shoulder ER 65 5 deg 30 70 deg 40 (post manual) 42 35°pre  75° post 43° (painful pinch)   Shoulder IR T4 N/a  67 deg 67 deg         RESTRICTIONS/PRECAUTIONS: s/p R shoulder SLAP and bicep tenodesis 9/20/22    Exercises/Interventions:     Therapeutic Ex (38287) Sets/Reps/ sec Notes/CUES   Wand AAROM: ER 45 degrees Supine     Pulleys: flex, abd    Supine flexion AROM    Supine wand ER    S/L ER AROM  S/L ER with abd punch  S/L and supine UE alphabet    S/L abduction AROM    Tball on table AAROM flexion & horiz abd/add    Tband scap retract rows & B ext    Wall crawls: flex, scaption    Standing wand IR hoiz behind back    Standing B ER AROM    Prone scap retract: row,   ext,   horiz abd,   45/90 ER    Resisted ROM PNF D2 supine (~50% resistance) 10x resisted (10x AROM)    Ball on plinth    Supine pec stretch over towel roll 30 sec    Supine flexion  Supine SA upper cut OVL 2x10    Arm alphabet:  S/L with arm in front and up to shant 0lbs, 2x    S/L abd with  assisted scap upward rotation  2lbs, 10x2    Progress HEP        Pt edu: ADL use of arm    Manual Intervention (07538)    PROM: flexion, abd, IR & ER @ 45 deg and @ 90 deg    Jt Mobs: posterior & inferior: grad II-III    Pec minor stretch 3x 30 sec    STM to subscap and lat 12 min            NMR re-education (19237) CUES NEEDED   Lat pull down stool sit 10x2 15lbs Cues to prep with scap then arms, reverse on release   Supine 90° shoulder flexion  perturbations 1 min (random) x2                                Therapeutic Activity (42205)                    IFC    Game Ready 15 min Min Compression       Therapeutic Exercise and NMR EXR  [x] (72644) Provided verbal/tactile cueing for activities related to strengthening, flexibility, endurance, ROM  for improvements in scapular, scapulothoracic and UE control with self care, reaching, carrying, lifting, house/yardwork, driving/computer work. [x] (06304) Provided verbal/tactile cueing for activities related to improving balance, coordination, kinesthetic sense, posture, motor skill, proprioception  to assist with  scapular, scapulothoracic and UE control with self care, reaching, carrying, lifting, house/yardwork, driving/computer work. Therapeutic Activities:    [] (56157 or 95321) Provided verbal/tactile cueing for activities related to improving balance, coordination, kinesthetic sense, posture, motor skill, proprioception and motor activation to allow for proper function of scapular, scapulothoracic and UE control with self care, carrying, lifting, driving/computer work. Home Exercise Program:    [] (54346) Reviewed/Progressed HEP activities related to strengthening, flexibility, endurance, ROM of scapular, scapulothoracic and UE control with self care, reaching, carrying, lifting, house/yardwork, driving/computer work  [] (73003) Reviewed/Progressed HEP activities related to improving balance, coordination, kinesthetic sense, posture, motor skill, proprioception of scapular, scapulothoracic and UE control with self care, reaching, carrying, lifting, house/yardwork, driving/computer work      Access Code: [email or web address]  URL: [email or web address]  Date: 10/24/2022  Prepared by: Manuel Chopra    Exercises  Standing Shoulder Flexion Wall Walk - 1 x daily - 7 x weekly - 3 sets - 10 reps  Standing Shoulder Scaption Wall Walk - 1 x daily - 7 x weekly - 3 sets - 10 reps  Supine Shoulder Flexion Extension Full Range AROM - 1 x daily - 7 x weekly - 3 sets - 10 reps  Supine Shoulder Alphabet - 1 x daily - 7 x weekly - 3 sets - 10 reps  Sidelying Shoulder External Rotation - 1 x daily - 7 x weekly - 3 sets - 10 reps  Sidelying Shoulder Abduction Palm Forward - 1 x daily - 7 x weekly - 3 sets - 10 reps  Shoulder External Rotation and Scapular Retraction - 1 x daily - 7 x weekly - 3 sets - 10 reps  10/31/22: added ER ROM with cane 45 degrees; IR BB across to spine      Manual Treatments:  PROM / STM / Oscillations-Mobs:  G-I, II, III, IV (PA's, Inf., Post.)  [x] (31311) Provided manual therapy to mobilize soft tissue/joints of cervical/CT, scapular GHJ and UE for the purpose of modulating pain, promoting relaxation,  increasing ROM, reducing/eliminating soft tissue swelling/inflammation/restriction, improving soft tissue extensibility and allowing for proper ROM for normal function with self care, reaching, carrying, lifting, house/yardwork, driving/computer work    Modalities: Game Ready with IFC to address inflammation and pain x 15 min    Charges  Timed Code Treatment Minutes: 40   Total Treatment Minutes: 55     [] EVAL (LOW) 86202   [] EVAL (MOD) 11315   [] EVAL (HIGH) 17570   [] RE-EVAL     [x] SK(18835) x 1  [] IONTO  [x] NMR (53720) x   1 [x] VASO  [x] Manual (41516) x 1  [] Other:  [] TA x      [] Mech Traction (82188)  [] ES(attended) (82107)      [] ES (un) (64834):     GOALS:  Patient stated goal: playing overhead volleyball  [] Progressing: [] Met: [] Not Met: [] Adjusted    Therapist goals for Patient:   Short Term Goals: To be achieved in: 2 weeks  1. Independent in HEP and progression per patient tolerance, in order to prevent re-injury. [] Progressing: [x] Met: [] Not Met: [] Adjusted  2. Patient will have a decrease in pain to facilitate improvement in movement, function, and ADLs as indicated by Functional Deficits. [] Progressing: [x] Met: [] Not Met: [] Adjusted    Long Term Goals: To be achieved in: 12 weeks  1. Disability index score of 10% or better for the FOTO shoulder score to assist with reaching prior level of function. [] Progressing: [] Met: [] Not Met: [] Adjusted  2.  Patient will demonstrate increased AROM to WNL and symmetrical to uninvolved limb to allow for proper joint functioning as indicated by patients Functional Deficits. [x] Progressing: [] Met: [] Not Met: [] Adjusted  3. Patient will demonstrate an increase in Strength to 4+/5 to allow for proper functional mobility as indicated by patients Functional Deficits. [] Progressing: [] Met: [x] Not Met: [] Adjusted  4. Patient will be able to lift a 5lbs object overhead with normal scapulohumeral rhythm without increased symptoms or restriction. [] Progressing: [] Met: [x] Not Met: [] Adjusted  5. Pt will be able to perform UE plyometric activity in OKC or CKC without increased symptoms or restriction to assist her in returning to an active lifestyle and PLOF. [] Progressing: [] Met: [x] Not Met: [] Adjusted     Progression Towards Functional goals:  [x] Patient is progressing as expected towards functional goals listed. [] Progression is slowed due to complexities listed. [] Progression has been slowed due to co-morbidities. [] Plan just implemented, too soon to assess goals progression  [] Other:     ASSESSMENT:  Pt is progressing with strength in supine perturbations exercise. Reports she is doing fine with pain but she struggles with more functional tasks around the house involving overhead reaching. Overall Progression Towards Functional goals/ Treatment Progress Update:  [x] Patient is progressing as expected towards functional goals listed. [] Progression is slowed due to complexities/Impairments listed. [] Progression has been slowed due to co-morbidities.   [] Plan just implemented, too soon to assess goals progression <30days   [] Goals require adjustment due to lack of progress  [] Patient is not progressing as expected and requires additional follow up with physician  [] Other    Prognosis for POC: [x] Good [] Fair  [] Poor      Patient requires continued skilled intervention: [x] Yes  [] No    Treatment/Activity Tolerance:  [x] Patient able to complete treatment  [] Patient limited by fatigue  [x] Patient limited by pain    [] Patient limited by other medical complications  [] Other:     Return to Play: (if applicable)   []  Stage 1: Intro to Strength   []  Stage 2: Return to Run and Strength   []  Stage 3: Return to Jump and Strength   []  Stage 4: Dynamic Strength and Agility   []  Stage 5: Sport Specific Training     []  Ready to Return to Play, Meets All Above Stages   []  Not Ready for Return to Sports   Comments:              PLAN: See eval. Pt to be seen 2 times a week for 12 weeks. [x] Continue per plan of care [] Alter current plan (see comments above)  [] Plan of care initiated [] Hold pending MD visit [] Discharge      Electronically signed by:  Christina Farah, PT  Tami Tovar [facility] Therapist was present, directed the patient's care, made skilled judgement, and was responsible for assessment and treatment of the patient. Note: If patient does not return for scheduled/ recommended follow up visits, this note will serve as a discharge from care along with most recent update on progress.

## 2022-11-21 ENCOUNTER — HOSPITAL ENCOUNTER (OUTPATIENT)
Dept: PHYSICAL THERAPY | Age: 37
Setting detail: THERAPIES SERIES
Discharge: HOME | End: 2022-11-21
Payer: COMMERCIAL

## 2022-11-21 PROCEDURE — 20560 NDL INSJ W/O NJX 1 OR 2 MUSC: CPT

## 2022-11-21 PROCEDURE — 97110 THERAPEUTIC EXERCISES: CPT

## 2022-11-21 PROCEDURE — 97140 MANUAL THERAPY 1/> REGIONS: CPT

## 2022-11-21 PROCEDURE — 97112 NEUROMUSCULAR REEDUCATION: CPT

## 2022-11-21 PROCEDURE — 97016 VASOPNEUMATIC DEVICE THERAPY: CPT

## 2022-11-21 NOTE — FLOWSHEET NOTE
[facility], [place], [address]  Phone: [phone]  Fax [phone]        Date:  2022    Patient Name:  Mitchell Lacy    :  1985  MRN: [id number]  Restrictions/Precautions:    Medical/Treatment Diagnosis Information:  Diagnosis: I72.701O (ICD-10-CM) - Superior labrum anterior-to-posterior (SLAP) tear of right shoulder  Treatment Diagnosis: M25. 611 R shoulder stiffness, M25.411 R shoulder effusion, M62.81 R shoulder weakness, M25.511 R shoulder pain  Insurance/Certification information:  PT Insurance Information: [facility], 90 visits hard limit, no auth  Physician Information:   Dr. Garcia Even  Has the plan of care been signed (Y/N):        []  Yes  [x]  No     Date of Patient follow up with Physician:  11/10/22      Is this a Progress Report:     []  Yes  [x]  No        If Yes:  Date Range for reporting period:  Beginning:   Ending     Progress report will be due (10 Rx or 30 days whichever is less):        Recertification will be due (POC Duration  / 90 days whichever is less): 22      Visit # Insurance Allowable Auth Required   In-person 15 90 visits (4 used already) []  Yes [x]  No    Telehealth - - []  Yes []  No    Total            Functional Scale: FOTO shoulder score: 36/100 = 36% ability, 64% disability  Date assessed:  22                                  FOTO shoulder score: 59/100 = 59% ability, 41% ability on 10/17/22      Number of Comorbidities:  [x]0     []1-2    []3+    Latex Allergy:  [x]NO      []YES  Preferred Language for Healthcare:   [x]English       []other:      Pain level: 1 /10     SUBJECTIVE:  I am still really frustrated with how tight it is.     OBJECTIVE:   Observation: post glide GHJ norm mobillity  Test measurements:       ROM Left AROM Right PROM Right PROM Right  10/24/22 R  22 Right  22 Right   All post-manual   Shoulder Flex 170 35 deg 100 160 deg 140 (post manual 151) 141° pre,  161° post  168°   Shoulder Abd 175 N/a 70 142 deg 125 (post manual 139) 124 ° pre,   157° post 146°   Shoulder ER 65 5 deg 30 70 deg 40 (post manual) 42 35°pre  75° post 43° (painful pinch)   Shoulder IR T4 N/a  67 deg 67 deg         RESTRICTIONS/PRECAUTIONS: s/p R shoulder SLAP and bicep tenodesis 9/20/22    Exercises/Interventions:     Therapeutic Ex (14372) Sets/Reps/ sec Notes/CUES   Wand AAROM: ER 45 degrees Supine     Pulleys: flex, abd    Supine flexion AROM    Supine wand ER    S/L ER AROM  S/L ER with abd punch  S/L and supine UE alphabet    S/L abduction AROM    Tball on table AAROM flexion & horiz abd/add    Tband scap retract rows & B ext    Wall crawls: flex, scaption    Standing wand IR hoiz behind back    Standing B ER AROM    Prone scap retract: row,   ext,   horiz abd,   45/90 ER    Resisted ROM PNF D2 supine (~50% resistance)    Ball on plinth    Supine pec stretch over towel roll    Supine flexion  Supine SA upper cut    Arm alphabet: S/L with arm in // to floor 2lbs, 2x    S/L abd with  assisted scap upward rotation     Wall pec stretch  3x 30 DC painful and not feel stretch   TB ext walkout and ecc Red TB 10x    Pt edu: ADL use of arm    Manual Intervention (67010)    PROM: flexion, abd, IR & ER @ 45 deg and @ 90 deg    Jt Mobs: posterior & inferior: grad II-III    Pec minor stretch  3x 30 sec    STM to subscap and lat (with movement) 8 min    Supine pec stretch over side of table 10 min    Dry needling assessment and tx 12 min    Cont relax SL scap dep/add 6 min        NMR re-education (10475) CUES NEEDED   Lat pull down stool sit Cues to prep with scap then arms, reverse on release   Supine 90° shoulder flexion  perturbations 1 min (random) x2    Bird dog 10x ea (arm only)    Low trap setting on weight machine 10x2 Wall one for home                                  Therapeutic Activity (82940)                    IFC    Game Ready 15 min Min Compression Consent signed 11/21/2022 and precautions addressed. See media tab. Muscle  Needle Size Technique Notes IES   Site 1 Deltoid ant, med x 3 .[id number] [] Pistoning / []  Threading  [x]  Winding/Coning  []    Site 2 Bicep prox x1 mid belly x1 .3x40mm [] Pistoning / [x]  Threading  [x]  Winding/Coning Twitch response []    Site 3  .[id number] [] Pistoning / []  Threading  []  Winding/Coning  []    Site 4  .3x50mm [] Pistoning / []  Threading  []  Winding/Coning  []    Site 5  .3x40mm [] Pistoning / []  Threading  []  Winding/Coning  []    Site 6   [] Pistoning / []  Threading  []  Winding/Coning  []    Site 7   [] Pistoning / []  Threading  []  Winding/Coning  []    Site 8   [] Pistoning / []  Threading  []  Winding/Coning  []    Site 9   [] Pistoning / []  Threading  []  Winding/Coning  []    Site 10   [] Pistoning / []  Threading  []  Winding/Coning  []      **The above techniques were used to restore functional range of motion, reduce muscle spasm, and induce healing in the corresponding musculature by means of intramuscular mobilization. Clean Technique was utilized today while applying the Dry needling treatment. The treatment sites where cleaned with 70% solution of isopropyl alcohol. The PT washed their hands and utilized treatment gloves along with hand  prior to inserting the needles. All needles where removed and discarded in the appropriate sharps container. MD has given verbal and/or written approval for this treatment. **          ** Educated patient on anatomy, trigger point etiology, expectations for TDN (bruising, soreness, etc), outcomes, and recommendations for exercise. **     Therapeutic Exercise and NMR EXR  [x] (87761) Provided verbal/tactile cueing for activities related to strengthening, flexibility, endurance, ROM  for improvements in scapular, scapulothoracic and UE control with self care, reaching, carrying, lifting, house/yardwork, driving/computer work.     [x] (76584) Provided verbal/tactile cueing for activities related to improving balance, coordination, kinesthetic sense, posture, motor skill, proprioception  to assist with  scapular, scapulothoracic and UE control with self care, reaching, carrying, lifting, house/yardwork, driving/computer work. Therapeutic Activities:    [] (53095 or 05375) Provided verbal/tactile cueing for activities related to improving balance, coordination, kinesthetic sense, posture, motor skill, proprioception and motor activation to allow for proper function of scapular, scapulothoracic and UE control with self care, carrying, lifting, driving/computer work. Home Exercise Program:    [] (43864) Reviewed/Progressed HEP activities related to strengthening, flexibility, endurance, ROM of scapular, scapulothoracic and UE control with self care, reaching, carrying, lifting, house/yardwork, driving/computer work  [] (15312) Reviewed/Progressed HEP activities related to improving balance, coordination, kinesthetic sense, posture, motor skill, proprioception of scapular, scapulothoracic and UE control with self care, reaching, carrying, lifting, house/yardwork, driving/computer work      Access Code: [email or web address]  URL: [email or web address]  Date: 11/21/2022  Prepared by: Anthony Mackay    Exercises  Shoulder extension with resistance - Neutral - 1 x daily - 7 x weekly - 3 sets - 10 reps  Bird Dog - 1 x daily - 7 x weekly - 3 sets - 10 reps  Standing Low Trap Setting with Resistance at [address] 1 x daily - 7 x weekly - 3 sets - 10 reps  Standing Shoulder Flexion AAROM with Swiss Ball - 1 x daily - 7 x weekly - 3 sets - 10 reps        Manual Treatments:  PROM / STM / Oscillations-Mobs:  G-I, II, III, IV (PA's, Inf., Post.)  [x] (89535) Provided manual therapy to mobilize soft tissue/joints of cervical/CT, scapular GHJ and UE for the purpose of modulating pain, promoting relaxation,  increasing ROM, reducing/eliminating soft tissue swelling/inflammation/restriction, improving soft tissue extensibility and allowing for proper ROM for normal function with self care, reaching, carrying, lifting, house/yardwork, driving/computer work    Modalities: Game Ready with IFC to address inflammation and pain x 15 min    Trigger point Dry Needling:  fine needle insertion into myofascial trigger points to stimulate a healing response  [] (53541) Needle insertion without injection: 1 or 2 muscles  [] (57840) Needle insertion without injection: 3 or more muscles  Charges  Timed Code Treatment Minutes: 68   Total Treatment Minutes: 83     [] EVAL (LOW) 65673   [] EVAL (MOD) 49715   [] EVAL (HIGH) 15125   [] RE-EVAL     [x] BQ(29289) x 1 [] IONTO  [x] NMR (68926) x 1   [x] VASO  [x] Manual (90444) x 2  [] Other:  [] TA x      [] Mech Traction (30559)  [] ES(attended) (18398)      [] ES (un) (13497):   [x] TDN needle insertion    Modalities:        [] GR/ESU 15 min    [] GR 15 min  [] ESU     [] CP    [] MHP    [] declined     GOALS:  Patient stated goal: playing overhead volleyball  [] Progressing: [] Met: [] Not Met: [] Adjusted    Therapist goals for Patient:   Short Term Goals: To be achieved in: 2 weeks  1. Independent in HEP and progression per patient tolerance, in order to prevent re-injury. [] Progressing: [x] Met: [] Not Met: [] Adjusted  2. Patient will have a decrease in pain to facilitate improvement in movement, function, and ADLs as indicated by Functional Deficits. [] Progressing: [x] Met: [] Not Met: [] Adjusted    Long Term Goals: To be achieved in: 12 weeks  1. Disability index score of 10% or better for the FOTO shoulder score to assist with reaching prior level of function. [] Progressing: [] Met: [] Not Met: [] Adjusted  2. Patient will demonstrate increased AROM to WNL and symmetrical to uninvolved limb to allow for proper joint functioning as indicated by patients Functional Deficits.    [x] Progressing: [] Met: [] Not Met: [] Adjusted  3. Patient will demonstrate an increase in Strength to 4+/5 to allow for proper functional mobility as indicated by patients Functional Deficits. [] Progressing: [] Met: [x] Not Met: [] Adjusted  4. Patient will be able to lift a 5lbs object overhead with normal scapulohumeral rhythm without increased symptoms or restriction. [] Progressing: [] Met: [x] Not Met: [] Adjusted  5. Pt will be able to perform UE plyometric activity in [place] or [facility] without increased symptoms or restriction to assist her in returning to an active lifestyle and PLOF. [] Progressing: [] Met: [x] Not Met: [] Adjusted     Progression Towards Functional goals:  [x] Patient is progressing as expected towards functional goals listed. [] Progression is slowed due to complexities listed. [] Progression has been slowed due to co-morbidities. [] Plan just implemented, too soon to assess goals progression  [] Other:     ASSESSMENT:  Pt benefited from skilled handling for STM and muscle release, PNF progressive stretching to get improved length from subscap and lat dorsi and pec minor to improved scapular humeral rhythm. Pt progressing with ROM but still need interventions for scapular rhythm and strength. Overall Progression Towards Functional goals/ Treatment Progress Update:  [x] Patient is progressing as expected towards functional goals listed. [] Progression is slowed due to complexities/Impairments listed. [] Progression has been slowed due to co-morbidities.   [] Plan just implemented, too soon to assess goals progression <30days   [] Goals require adjustment due to lack of progress  [] Patient is not progressing as expected and requires additional follow up with physician  [] Other    Prognosis for POC: [x] Good [] Fair  [] Poor      Patient requires continued skilled intervention: [x] Yes  [] No    Treatment/Activity Tolerance:  [x] Patient able to complete treatment  [] Patient limited by fatigue  [x] Patient limited by pain    [] Patient limited by other medical complications  [] Other:     Return to Play: (if applicable)   []  Stage 1: Intro to Strength   []  Stage 2: Return to Run and Strength   []  Stage 3: Return to Jump and Strength   []  Stage 4: Dynamic Strength and Agility   []  Stage 5: Sport Specific Training     []  Ready to Return to Play, Meets All Above Stages   []  Not Ready for Return to Sports   Comments:              PLAN: See eval. Pt to be seen 2 times a week for 12 weeks. [x] Continue per plan of care [] Alter current plan (see comments above)  [] Plan of care initiated [] Hold pending MD visit [] Discharge      Electronically signed by:  Demetra Wiseman PT  Libra Anderson, SPT Therapist was present, directed the patient's care, made skilled judgement, and was responsible for assessment and treatment of the patient. Note: If patient does not return for scheduled/ recommended follow up visits, this note will serve as a discharge from care along with most recent update on progress.

## 2022-11-23 ENCOUNTER — HOSPITAL ENCOUNTER (OUTPATIENT)
Dept: PHYSICAL THERAPY | Age: 37
Setting detail: THERAPIES SERIES
Discharge: HOME | End: 2022-11-23
Payer: COMMERCIAL

## 2022-11-23 PROCEDURE — 97110 THERAPEUTIC EXERCISES: CPT

## 2022-11-23 PROCEDURE — 97016 VASOPNEUMATIC DEVICE THERAPY: CPT

## 2022-11-23 PROCEDURE — 97140 MANUAL THERAPY 1/> REGIONS: CPT

## 2022-11-23 NOTE — FLOWSHEET NOTE
[facility], [place], [address]  Phone: [phone]  Fax [phone]        Date:  2022    Patient Name:  Marge Esparza    :  1985  MRN: [id number]  Restrictions/Precautions:    Medical/Treatment Diagnosis Information:  Diagnosis: U74.348C (ICD-10-CM) - Superior labrum anterior-to-posterior (SLAP) tear of right shoulder  Treatment Diagnosis: M25. 611 R shoulder stiffness, M25.411 R shoulder effusion, M62.81 R shoulder weakness, M25.511 R shoulder pain  Insurance/Certification information:  PT Insurance Information: [facility], 90 visits hard limit, no auth  Physician Information:   Dr. Arie Mckeon  Has the plan of care been signed (Y/N):        []  Yes  [x]  No     Date of Patient follow up with Physician:  11/10/22      Is this a Progress Report:     []  Yes  [x]  No        If Yes:  Date Range for reporting period:  Beginning:   Ending     Progress report will be due (10 Rx or 30 days whichever is less):        Recertification will be due (POC Duration  / 90 days whichever is less): 22      Visit # Insurance Allowable Auth Required   In-person 15 90 visits (4 used already) []  Yes [x]  No    Telehealth - - []  Yes []  No    Total            Functional Scale: FOTO shoulder score: 36/100 = 36% ability, 64% disability  Date assessed:  22                                  FOTO shoulder score: 59/100 = 59% ability, 41% ability on 10/17/22      Number of Comorbidities:  [x]0     []1-2    []3+    Latex Allergy:  [x]NO      []YES  Preferred Language for Healthcare:   [x]English       []other:      Pain level: 1 /10     SUBJECTIVE:  I woke up a lot less stiff yesterday and today. But I did not stretch it out first thing like I usually do so it is tighter because of that.  Pt feels last session helped either from all the manual work or with the dry needling because she had generally less pain and was able to more easily reach up to open her curtains.      OBJECTIVE:   Observation: post glide GHJ norm mobillity  Test measurements:       ROM Left AROM Right PROM Right PROM Right  10/24/22 R  11/7/22 Right  11/9/22 Right 11/16  All post-manual   Shoulder Flex 170 35 deg 100 160 deg 140 (post manual 151) 141° pre,  161° post  168°   Shoulder Abd 175 N/a 70 142 deg 125 (post manual 139) 124 ° pre,   157° post 146°   Shoulder ER 65 5 deg 30 70 deg 40 (post manual) 42 35°pre  75° post 43° (painful pinch)   Shoulder IR T4 N/a  67 deg 67 deg         RESTRICTIONS/PRECAUTIONS: s/p R shoulder SLAP and bicep tenodesis 9/20/22    Exercises/Interventions:     Therapeutic Ex (68139) Sets/Reps/ sec Notes/CUES   Wand AAROM: ER 45 degrees Supine     Pulleys: flex, abd    Supine flexion AROM    Supine wand ER    S/L ER AROM  S/L ER with abd punch  S/L and supine UE alphabet 2 lbs 2x    S/L abduction AROM     Tball on table AAROM flexion & horiz abd/add    Tband scap retract rows & B ext    Wall crawls: flex, scaption    Standing wand IR hoiz behind back    Standing B ER AROM    Prone scap retract: row,   ext,   horiz abd,   45/90 ER    Resisted ROM PNF D2 supine (~50% resistance)    Ball on plinth    Supine pec stretch over towel roll    Supine flexion  Supine SA upper cut    Arm alphabet: S/L with arm in // to floor    S/L abd with  assisted scap upward rotation     Wall pec stretch  DC painful and not feel stretch   TB ext walkout and ecc    Pt edu: ADL use of arm    Yoga ball pec stretch 10 sec hold 2 min    Standing Y I T 1 lb weight 8x                    Manual Intervention (23721)    PROM: flexion, abd, IR & ER @ 45 deg and @ 90 deg 15 min    Jt Mobs: posterior & : grad II-III 8 min    Pec minor stretch     STM to subscap and lat (with movement)    Supine pec stretch over side of table    Dry needling assessment and tx    Cont relax SL scap dep/add    Abd stretch with pressure on bicep tendon 5 min                NMR re-education (52793) CUES NEEDED   Lat pull down stool sit Cues to prep with scap then arms, reverse on release   Supine 90° shoulder flexion  perturbations    Bird dog    Low trap setting on weight machine Wall one for home                                  Therapeutic Activity (78877)                    IFC    Game Ready 15 min Min Compression     Consent signed 11/21/2022 and precautions addressed. See media tab. Needle Size Technique Notes IES   Site 1 .88x47um [] Pistoning / []  Threading  [x]  Winding/Coning  []    Site 2 .3x40mm [] Pistoning / [x]  Threading  [x]  Winding/Coning Twitch response []    Site 3  .22v20kb [] Pistoning / []  Threading  []  Winding/Coning  []    Site 4  .3x50mm [] Pistoning / []  Threading  []  Winding/Coning  []    Site 5  .3x40mm [] Pistoning / []  Threading  []  Winding/Coning  []    Site 6   [] Pistoning / []  Threading  []  Winding/Coning  []    Site 7   [] Pistoning / []  Threading  []  Winding/Coning  []    Site 8   [] Pistoning / []  Threading  []  Winding/Coning  []    Site 9   [] Pistoning / []  Threading  []  Winding/Coning  []    Site 10   [] Pistoning / []  Threading  []  Winding/Coning  []      **The above techniques were used to restore functional range of motion, reduce muscle spasm, and induce healing in the corresponding musculature by means of intramuscular mobilization. Clean Technique was utilized today while applying the Dry needling treatment. The treatment sites where cleaned with 70% solution of isopropyl alcohol. The PT washed their hands and utilized treatment gloves along with hand  prior to inserting the needles. All needles where removed and discarded in the appropriate sharps container. MD has given verbal and/or written approval for this treatment. **          ** Educated patient on anatomy, trigger point etiology, expectations for TDN (bruising, soreness, etc), outcomes, and recommendations for exercise.  **     Therapeutic Exercise and NMR EXR  [x] (98043) Provided verbal/tactile cueing for activities related to strengthening, flexibility, endurance, ROM  for improvements in scapular, scapulothoracic and UE control with self care, reaching, carrying, lifting, house/yardwork, driving/computer work. [x] (49430) Provided verbal/tactile cueing for activities related to improving balance, coordination, kinesthetic sense, posture, motor skill, proprioception  to assist with  scapular, scapulothoracic and UE control with self care, reaching, carrying, lifting, house/yardwork, driving/computer work. Therapeutic Activities:    [] (63980 or 24336) Provided verbal/tactile cueing for activities related to improving balance, coordination, kinesthetic sense, posture, motor skill, proprioception and motor activation to allow for proper function of scapular, scapulothoracic and UE control with self care, carrying, lifting, driving/computer work. Home Exercise Program:    [x] (74851) Reviewed/Progressed HEP activities related to strengthening, flexibility, endurance, ROM of scapular, scapulothoracic and UE control with self care, reaching, carrying, lifting, house/yardwork, driving/computer work  [] (11650) Reviewed/Progressed HEP activities related to improving balance, coordination, kinesthetic sense, posture, motor skill, proprioception of scapular, scapulothoracic and UE control with self care, reaching, carrying, lifting, house/yardwork, driving/computer work      Access Code: [email or web address]  URL: [email or web address]  Date: 11/21/2022  Prepared by: Akilah Jack    Exercises  Shoulder extension with resistance - Neutral - 1 x daily - 7 x weekly - 3 sets - 10 reps  Bird Dog - 1 x daily - 7 x weekly - 3 sets - 10 reps  Standing Low Trap Setting with Resistance at [address] 1 x daily - 7 x weekly - 3 sets - 10 reps  Standing Shoulder Flexion AAROM with Swiss Ball - 1 x daily - 7 x weekly - 3 sets - 10 reps        Manual Treatments:  PROM / STM / Oscillations-Mobs:  G-I, II, III, IV (PA's, Inf., Post.)  [x] (20918) Provided manual therapy to mobilize soft tissue/joints of cervical/CT, scapular GHJ and UE for the purpose of modulating pain, promoting relaxation,  increasing ROM, reducing/eliminating soft tissue swelling/inflammation/restriction, improving soft tissue extensibility and allowing for proper ROM for normal function with self care, reaching, carrying, lifting, house/yardwork, driving/computer work    Modalities: Game Ready  to address inflammation and pain x 15 min    Trigger point Dry Needling:  fine needle insertion into myofascial trigger points to stimulate a healing response  [] (99744) Needle insertion without injection: 1 or 2 muscles  [] (53060) Needle insertion without injection: 3 or more muscles  Charges  Timed Code Treatment Minutes: 50   Total Treatment Minutes: 65     [] EVAL (LOW) 09908   [] EVAL (MOD) 57312   [] EVAL (HIGH) 58599   [] RE-EVAL     [x] FI(42337) x 1 [] IONTO  [] NMR (90959) x    [x] VASO  [x] Manual (33369) x 2  [] Other:  [] TA x      [] Mech Traction (18612)  [] ES(attended) (15232)      [] ES (un) (67306):   [] TDN needle insertion    Modalities:        [] GR/ESU 15 min    [] GR 15 min  [] ESU     [] CP    [] MHP    [] declined     GOALS:  Patient stated goal: playing overhead volleyball  [] Progressing: [] Met: [x] Not Met: [] Adjusted    Therapist goals for Patient:   Short Term Goals: To be achieved in: 2 weeks  1. Independent in HEP and progression per patient tolerance, in order to prevent re-injury. [] Progressing: [x] Met: [] Not Met: [] Adjusted  2. Patient will have a decrease in pain to facilitate improvement in movement, function, and ADLs as indicated by Functional Deficits. [] Progressing: [x] Met: [] Not Met: [] Adjusted    Long Term Goals: To be achieved in: 12 weeks  1. Disability index score of 10% or better for the FOTO shoulder score to assist with reaching prior level of function.    [] Progressing: [] Met: [] Not Met: [] Adjusted  2. Patient will demonstrate increased AROM to WNL and symmetrical to uninvolved limb to allow for proper joint functioning as indicated by patients Functional Deficits. [x] Progressing: [] Met: [] Not Met: [] Adjusted  3. Patient will demonstrate an increase in Strength to 4+/5 to allow for proper functional mobility as indicated by patients Functional Deficits. [] Progressing: [] Met: [x] Not Met: [] Adjusted  4. Patient will be able to lift a 5lbs object overhead with normal scapulohumeral rhythm without increased symptoms or restriction. [] Progressing: [] Met: [x] Not Met: [] Adjusted  5. Pt will be able to perform UE plyometric activity in [place] or [facility] without increased symptoms or restriction to assist her in returning to an active lifestyle and PLOF. [] Progressing: [] Met: [x] Not Met: [] Adjusted     Progression Towards Functional goals:  [x] Patient is progressing as expected towards functional goals listed. [] Progression is slowed due to complexities listed. [] Progression has been slowed due to co-morbidities. [] Plan just implemented, too soon to assess goals progression  [] Other:     ASSESSMENT:  Pt reported that dry needling from Monday provided relieve but that she needs to also stretch first thing in the morning in order to keep that relief. Pt still has scapular weakness and needs cueing for scap humeral rhythm and ROM. Pt reports catching in front of shoulder near proximal bicep tendon and needs to move it around to relieve it. Overall Progression Towards Functional goals/ Treatment Progress Update:  [x] Patient is progressing as expected towards functional goals listed. [] Progression is slowed due to complexities/Impairments listed. [] Progression has been slowed due to co-morbidities.   [] Plan just implemented, too soon to assess goals progression <30days   [] Goals require adjustment due to lack of progress  [] Patient is not progressing as expected and requires additional follow up with physician  [] Other    Prognosis for POC: [x] Good [] Fair  [] Poor      Patient requires continued skilled intervention: [x] Yes  [] No    Treatment/Activity Tolerance:  [x] Patient able to complete treatment  [] Patient limited by fatigue  [x] Patient limited by pain    [] Patient limited by other medical complications  [] Other:     Return to Play: (if applicable)   []  Stage 1: Intro to Strength   []  Stage 2: Return to Run and Strength   []  Stage 3: Return to Jump and Strength   []  Stage 4: Dynamic Strength and Agility   []  Stage 5: Sport Specific Training     []  Ready to Return to Play, Meets All Above Stages   []  Not Ready for Return to Sports   Comments:              PLAN: See eval. Pt to be seen 2 times a week for 12 weeks. [x] Continue per plan of care [] Alter current plan (see comments above)  [] Plan of care initiated [] Hold pending MD visit [] Discharge      Electronically signed by:  ROBINSON Winters SPT Therapist was present, directed the patient's care, made skilled judgement, and was responsible for assessment and treatment of the patient. Note: If patient does not return for scheduled/ recommended follow up visits, this note will serve as a discharge from care along with most recent update on progress.

## 2022-11-28 ENCOUNTER — HOSPITAL ENCOUNTER (OUTPATIENT)
Dept: PHYSICAL THERAPY | Age: 37
Setting detail: THERAPIES SERIES
Discharge: HOME | End: 2022-11-28
Payer: COMMERCIAL

## 2022-11-28 PROCEDURE — 97110 THERAPEUTIC EXERCISES: CPT

## 2022-11-28 PROCEDURE — 97016 VASOPNEUMATIC DEVICE THERAPY: CPT

## 2022-11-28 PROCEDURE — 97140 MANUAL THERAPY 1/> REGIONS: CPT

## 2022-11-28 NOTE — PROGRESS NOTES
The [address] [place] [address],[address], [address]  Phone: [phone]  Fax [phone]        Date:  2022    Patient Name:  Princess Garza    :  1985  MRN: [id number]  Restrictions/Precautions:    Medical/Treatment Diagnosis Information:  Diagnosis: U80.294T (ICD-10-CM) - Superior labrum anterior-to-posterior (SLAP) tear of right shoulder  Treatment Diagnosis: M25. 611 R shoulder stiffness, M25.411 R shoulder effusion, M62.81 R shoulder weakness, M25.511 R shoulder pain  Insurance/Certification information:  PT Insurance Information: [place], 90 visits hard limit, no auth  Physician Information:   Dr. Eva Rainey  Has the plan of care been signed (Y/N):        []  Yes  [x]  No     Date of Patient follow up with Physician:  11/10/22      Is this a Progress Report:     [x]  Yes  []  No        If Yes:  Date Range for reporting period:  Beginning: 10/23/22  Ending 22    Progress report will be due (10 Rx or 30 days whichever is less):        Recertification will be due (POC Duration  / 90 days whichever is less): 22      Visit # Insurance Allowable Auth Required   In-person 16 90 visits (4 used already) []  Yes [x]  No    Telehealth - - []  Yes []  No    Total            Functional Scale: FOTO shoulder score: 36/100 = 36% ability, 64% disability  Date assessed:  22                                  FOTO shoulder score: 59/100 = 59% ability, 41% ability on 10/17/22      Number of Comorbidities:  [x]0     []1-2    []3+    Latex Allergy:  [x]NO      []YES  Preferred Language for Healthcare:   [x]English       []other:      Pain level: 0/10 (Just tightness)    SUBJECTIVE:  I feel ok. It is just really tight and takes a minute to get warmed up in my exercises and get loose in the mornings.     OBJECTIVE:   Observation: post glide GHJ norm mobillity  Test measurements:       ROM Left AROM Right PROM Right PROM Right  10/24/22 R  11/7/22 Right  11/9/22 Right 11/16  All post-manual R 11/28/22   Shoulder Flex 170 35 deg 100 160 deg 140 (post manual 151) 141° pre,  161° post  168° 166°   Shoulder Abd 175 N/a 70 142 deg 125 (post manual 139) 124 ° pre,   157° post 146° 173°   Shoulder ER 65 5 deg 30 70 deg 40 (post manual) 42 35°pre  75° post 43° (painful pinch) 70°   Shoulder IR T4 N/a  67 deg 67 deg          RESTRICTIONS/PRECAUTIONS: s/p R shoulder SLAP and bicep tenodesis 9/20/22    Exercises/Interventions:     Therapeutic Ex (64434) Sets/Reps/ sec Notes/CUES   Wand AAROM: ER 45 degrees Supine     Pulleys: flex, abd    Supine flexion AROM    Supine wand ER    S/L ER AROM  S/L ER with abd punch  S/L and supine UE alphabet    S/L abduction AROM  2x10 1 lb    Tball on table AAROM flexion & horiz abd/add    Tband scap retract rows & B ext    Wall crawls: flex, scaption    Standing wand IR hoiz behind back    Standing B ER AROM    Prone scap retract: row,   ext,   horiz abd,   45/90 ER    Resisted ROM PNF D2 supine (~50% resistance)    Ball on plinth    Supine pec stretch over towel roll    Supine flexion  Supine SA upper cut    Arm alphabet: S/L with arm in // to floor    S/L abd with  assisted scap upward rotation     Wall pec stretch  DC painful and not feel stretch   TB ext walkout and ecc    Pt edu: ADL use of arm    Yoga ball pec stretch    Standing Y I T 1 lb weight 8x                    Manual Intervention (18868)    PROM: flexion, abd, IR & ER @ 45 deg and @ 90 deg 5 min    Jt Mobs: posterior & : grad II-III    Pec minor stretch  3x 30 sec    STM to subscap and lat (with movement) 6 min    Supine pec stretch over side of table    Dry needling assessment and tx    Cont relax SL scap dep/add    Abd stretch with pressure on bicep tendon    STM pec minor 2 min             NMR re-education (20090) CUES NEEDED   Lat pull down stool sit Cues to prep with scap then arms, reverse on release   Supine 90° shoulder flexion perturbations    Bird dog    Low trap setting on weight machine Wall one for home   Bosu                                Therapeutic Activity (91989)                    IFC    Game Ready 15 min Min Compression     Consent signed 11/21/2022 and precautions addressed. See media tab. Needle Size Technique Notes IES   Site 1 [id number] [] Pistoning / []  Threading  [x]  Winding/Coning  []    Site 2 .3x40mm [] Pistoning / [x]  Threading  [x]  Winding/Coning Twitch response []    Site 3  .[id number] [] Pistoning / []  Threading  []  Winding/Coning  []    Site 4  .3x50mm [] Pistoning / []  Threading  []  Winding/Coning  []    Site 5  .3x40mm [] Pistoning / []  Threading  []  Winding/Coning  []    Site 6   [] Pistoning / []  Threading  []  Winding/Coning  []    Site 7   [] Pistoning / []  Threading  []  Winding/Coning  []    Site 8   [] Pistoning / []  Threading  []  Winding/Coning  []    Site 9   [] Pistoning / []  Threading  []  Winding/Coning  []    Site 10   [] Pistoning / []  Threading  []  Winding/Coning  []      **The above techniques were used to restore functional range of motion, reduce muscle spasm, and induce healing in the corresponding musculature by means of intramuscular mobilization. Clean Technique was utilized today while applying the Dry needling treatment. The treatment sites where cleaned with 70% solution of isopropyl alcohol. The PT washed their hands and utilized treatment gloves along with hand  prior to inserting the needles. All needles where removed and discarded in the appropriate sharps container. MD has given verbal and/or written approval for this treatment. **          ** Educated patient on anatomy, trigger point etiology, expectations for TDN (bruising, soreness, etc), outcomes, and recommendations for exercise.  **     Therapeutic Exercise and NMR EXR  [x] (12393) Provided verbal/tactile cueing for activities related to strengthening, flexibility, endurance, ROM  for improvements Flexion AAROM with Swiss Ball - 1 x daily - 7 x weekly - 3 sets - 10 reps  Standing Shoulder Scaption - 1 x daily - 7 x weekly - 3 sets - 10 reps  Supine Chest Stretch on Swiss Ball - 1 x daily - 7 x weekly - 3 sets - 10 reps      Everyday:  Access Code: [email or web address]  URL: [email or web address]  Date: 11/28/2022  Prepared by: Edita     Exercises  Standing Shoulder External Rotation AAROM with Dowel - 1 x daily - 7 x weekly - 3 sets - 10 reps  Standing Bilateral Shoulder Internal Rotation AAROM with Dowel - 1 x daily - 7 x weekly - 3 sets - 10 reps  Shoulder Flexion Wall Slide with Towel - 1 x daily - 7 x weekly - 3 sets - 10 reps  Shoulder External Rotation with Anchored Resistance - 1 x daily - 7 x weekly - 3 sets - 10 reps  Shoulder Internal Rotation with Resistance - 1 x daily - 7 x weekly - 3 sets - 10 reps  Standing Shoulder Row with Anchored Resistance - 1 x daily - 7 x weekly - 3 sets - 10 reps  Standing Row with Resistance with Anchored Resistance at Chest Height Palms Down - 1 x daily - 7 x weekly - 3 sets - 10 reps  Standing Shoulder Abduction Slides at Wall - 1 x daily - 7 x weekly - 3 sets - 10 reps  Y,I,T - 1 x daily - 7 x weekly - 3 sets - 10 reps  Bird Dog - 1 x daily - 7 x weekly - 3 sets - 10 reps  Standing Low Trap Setting with Resistance at [place] - 1 x daily - 7 x weekly - 3 sets - 10 reps  Supine Chest Stretch on Swiss Ball - 1 x daily - 7 x weekly - 3 sets - 10 reps          Manual Treatments:  PROM / STM / Oscillations-Mobs:  G-I, II, III, IV (PA's, Inf., Post.)  [x] (28511) Provided manual therapy to mobilize soft tissue/joints of cervical/CT, scapular GHJ and UE for the purpose of modulating pain, promoting relaxation,  increasing ROM, reducing/eliminating soft tissue swelling/inflammation/restriction, improving soft tissue extensibility and allowing for proper ROM for normal function with self care, reaching, carrying, lifting, house/yardwork, driving/computer work    Modalities: Game Ready  to address inflammation and pain x 15 min    Trigger point Dry Needling:  fine needle insertion into myofascial trigger points to stimulate a healing response  [] (80328) Needle insertion without injection: 1 or 2 muscles  [] (95539) Needle insertion without injection: 3 or more muscles  Charges  Timed Code Treatment Minutes: 40   Total Treatment Minutes: 60     [] EVAL (LOW) 12857   [] EVAL (MOD) 33451   [] EVAL (HIGH) 54230   [] RE-EVAL     [x] NX(18128) x 2  [] IONTO  [] NMR (80589) x    [x] VASO  [x] Manual (05670) x 1  [] Other:  [] TA x      [] Mech Traction (91865)  [] ES(attended) (54111)      [] ES (un) (60158):   [] TDN needle insertion    Modalities:        [] GR/ESU 15 min    [] GR 15 min  [] ESU     [] CP    [] MHP    [] declined     GOALS:  Patient stated goal: playing overhead volleyball  [] Progressing: [] Met: [x] Not Met: [] Adjusted    Therapist goals for Patient:   Short Term Goals: To be achieved in: 2 weeks  1. Independent in HEP and progression per patient tolerance, in order to prevent re-injury. [] Progressing: [x] Met: [] Not Met: [] Adjusted  2. Patient will have a decrease in pain to facilitate improvement in movement, function, and ADLs as indicated by Functional Deficits. [] Progressing: [x] Met: [] Not Met: [] Adjusted    Long Term Goals: To be achieved in: 12 weeks  1. Disability index score of 10% or better for the FOTO shoulder score to assist with reaching prior level of function. [] Progressing: [] Met: [] Not Met: [] Adjusted  2. Patient will demonstrate increased AROM to WNL and symmetrical to uninvolved limb to allow for proper joint functioning as indicated by patients Functional Deficits. [x] Progressing: [] Met: [] Not Met: [] Adjusted  3. Patient will demonstrate an increase in Strength to 4+/5 to allow for proper functional mobility as indicated by patients Functional Deficits. [] Progressing: [] Met: [x] Not Met: [] Adjusted  4.  Patient will be able to lift a 5lbs object overhead with normal scapulohumeral rhythm without increased symptoms or restriction. [] Progressing: [] Met: [x] Not Met: [] Adjusted  5. Pt will be able to perform UE plyometric activity in [place] or [facility] without increased symptoms or restriction to assist her in returning to an active lifestyle and PLOF. [] Progressing: [] Met: [x] Not Met: [] Adjusted     Progression Towards Functional goals:  [x] Patient is progressing as expected towards functional goals listed. [] Progression is slowed due to complexities listed. [] Progression has been slowed due to co-morbidities. [] Plan just implemented, too soon to assess goals progression  [] Other:     ASSESSMENT:  HEP updated and organized so patient can more easily look through focused exercises. Pt responds well to manuals and reports significant change (with pinching/tightness sensation) with overhead ROM. Pt is making progress with functional movements but still struggles with end range over head at home. Pec minor/chest is tight along with subscap and lat, which limits her with functional movements. Overall Progression Towards Functional goals/ Treatment Progress Update:  [x] Patient is progressing as expected towards functional goals listed. [] Progression is slowed due to complexities/Impairments listed. [] Progression has been slowed due to co-morbidities.   [] Plan just implemented, too soon to assess goals progression <30days   [] Goals require adjustment due to lack of progress  [] Patient is not progressing as expected and requires additional follow up with physician  [] Other    Prognosis for POC: [x] Good [] Fair  [] Poor      Patient requires continued skilled intervention: [x] Yes  [] No    Treatment/Activity Tolerance:  [x] Patient able to complete treatment  [] Patient limited by fatigue  [x] Patient limited by pain    [] Patient limited by other medical complications  [] Other:     Return to Play: (if applicable)   []  Stage 1: Intro to Strength   []  Stage 2: Return to Run and Strength   []  Stage 3: Return to Jump and Strength   []  Stage 4: Dynamic Strength and Agility   []  Stage 5: Sport Specific Training     []  Ready to Return to Play, Meets All Above Stages   []  Not Ready for Return to Sports   Comments:              PLAN: See eval. Pt to be seen 2 times a week for 12 weeks. [x] Continue per plan of care [] Alter current plan (see comments above)  [] Plan of care initiated [] Hold pending MD visit [] Discharge      Electronically signed by:  Bossman Vazquez, ROBINSON Treviño, EKATERINA Therapist was present, directed the patient's care, made skilled judgement, and was responsible for assessment and treatment of the patient. Note: If patient does not return for scheduled/ recommended follow up visits, this note will serve as a discharge from care along with most recent update on progress.

## 2022-12-02 ENCOUNTER — HOSPITAL ENCOUNTER (OUTPATIENT)
Dept: PHYSICAL THERAPY | Age: 37
Setting detail: THERAPIES SERIES
Discharge: HOME | End: 2022-12-02
Payer: COMMERCIAL

## 2022-12-02 PROCEDURE — 20561 NDL INSJ W/O NJX 3+ MUSC: CPT

## 2022-12-02 PROCEDURE — 97112 NEUROMUSCULAR REEDUCATION: CPT

## 2022-12-02 PROCEDURE — 97140 MANUAL THERAPY 1/> REGIONS: CPT

## 2022-12-02 NOTE — FLOWSHEET NOTE
The [address] [facility] and [address], [address] [place] [address], [address] [place] [address] Road  Phone: [phone]  Fax [phone]        Date:  2022    Patient Name:  Samantha Higgins    :  1985  MRN: [id number]  Restrictions/Precautions:    Medical/Treatment Diagnosis Information:  Diagnosis: U89.443U (ICD-10-CM) - Superior labrum anterior-to-posterior (SLAP) tear of right shoulder  Treatment Diagnosis: M25. 611 R shoulder stiffness, M25.411 R shoulder effusion, M62.81 R shoulder weakness, M25.511 R shoulder pain  Insurance/Certification information:  PT Insurance Information: [facility], 90 visits hard limit, no auth  Physician Information:   Dr. Allen Cruz  Has the plan of care been signed (Y/N):        []  Yes  [x]  No     Date of Patient follow up with Physician:  11/10/22      Is this a Progress Report:     []  Yes  [x]  No        If Yes:  Date Range for reporting period:  Beginnin22  Ending     Progress report will be due (10 Rx or 30 days whichever is less):        Recertification will be due (POC Duration  / 90 days whichever is less): 22      Visit # Insurance Allowable Auth Required   In-person 16 90 visits (4 used already) []  Yes [x]  No    Telehealth - - []  Yes []  No    Total            Functional Scale: FOTO shoulder score: 36/100 = 36% ability, 64% disability  Date assessed:  22                                  FOTO shoulder score: 59/100 = 59% ability, 41% ability on 10/17/22      Number of Comorbidities:  [x]0     []1-2    []3+    Latex Allergy:  [x]NO      []YES  Preferred Language for Healthcare:   [x]English       []other:      Pain level: 1-3/10    SUBJECTIVE:  Pt says she has some questions about her new HEP because she did the more advanced set after seeing us and it felt like the shoulder just got stiff and kind of locked up after that so she back down and did the \"rainy day\" set for 2 days.  She felt like her muscles just went into spasm and are still a little like that this morning.      OBJECTIVE:   Observation: post glide GHJ mild hypomobility, tight and trigger points in upper trap  Test measurements:       ROM Left AROM Right PROM Right PROM Right  10/24/22 R  11/7/22 Right  11/9/22 Right 11/16  All post-manual R 11/28/22   Shoulder Flex 170 35 deg 100 160 deg 140 (post manual 151) 141° pre,  161° post  168° 166°   Shoulder Abd 175 N/a 70 142 deg 125 (post manual 139) 124 ° pre,   157° post 146° 173°   Shoulder ER 65 5 deg 30 70 deg 40 (post manual) 42 35°pre  75° post 43° (painful pinch) 70°   Shoulder IR T4 N/a  67 deg 67 deg          RESTRICTIONS/PRECAUTIONS: s/p R shoulder SLAP and bicep tenodesis 9/20/22    Exercises/Interventions:     Therapeutic Ex (57433) Sets/Reps/ sec Notes/CUES   Wand AAROM: ER 45 degrees Supine     Pulleys: flex, abd    Supine flexion AROM    Supine wand ER    S/L ER AROM  S/L ER with abd punch  S/L and supine UE alphabet    S/L abduction AROM  2x10 1 lb    Tball on table AAROM flexion & horiz abd/add    Tband scap retract rows & B ext  Tband high row (at 60deg abd) Red 2x10   Cued for scap dress with retract   Wall crawls: flex, scaption    Standing wand IR hoiz behind back    Standing B ER AROM    Prone scap retract: row,   ext,   horiz abd,   45/90 ER    Resisted ROM PNF D2 supine (~50% resistance)    Ball on plinth    Supine pec stretch over towel roll    Supine flexion  Supine SA upper cut    Arm alphabet: S/L with arm in // to floor    S/L abd with  assisted scap upward rotation     Wall pec stretch  DC painful and not feel stretch   TB ext walkout and ecc    Pt edu: ADL use of arm    Yoga ball pec stretch    Standing Y I T 1 lb weight 8x                    Manual Intervention (86799) Total: 44 min    PROM: flexion, abd, IR & ER @ 45 deg and @ 90 deg 5 min    Jt Mobs: posterior & : grad II-III 2 min    Pec minor stretch  2x 30 sec    STM to subscap and lat (with movement) 5 min    Supine pec stretch over side of table    Dry needling assessment and tx 12 min    Cont relax SL scap dep/add 6 min Arm in abd   Abd stretch with pressure on bicep tendon    STM/trigger point:  pec minor,  upper trap, levator, middle and lower trap, rhomboids, infrapinatus & teres       10 min    Seated MWM shoulder flexion, GH lateral glide & scap upward rot 1 min        NMR re-education (40011) CUES NEEDED   Lat pull down stool sit Cues to prep with scap then arms, reverse on release   Supine 90° shoulder flexion  perturbations    Bird dog    Quadruped SA shoulder taps 10x ea (arm only)  2x10     Tactile cue for SA   Low trap setting on weight machine Wall one for home   Bosu                                Therapeutic Activity (51131)                    IFC    Game Ready Declined, needed to get to work     Consent signed 11/21/2022 and precautions addressed. See media tab. Needle Size Technique Notes IES   Site 1 Deltoid post & middle x 3 [id number] [] Pistoning / []  Threading  [x]  Winding/Coning  []    Site 2 Bicep prox-mid x1  .3x40mm [] Pistoning / [x]  Threading  [x]  Winding/Coning Twitch response []    Site 3 Right upper trap x1 .3x40mm [] Pistoning / [x]  Threading  []  Winding/Coning Twitch response, major release []    Site 4  .3x50mm [] Pistoning / []  Threading  []  Winding/Coning  []    Site 5  .3x40mm [] Pistoning / []  Threading  []  Winding/Coning  []    Site 6   [] Pistoning / []  Threading  []  Winding/Coning  []    Site 7   [] Pistoning / []  Threading  []  Winding/Coning  []    Site 8   [] Pistoning / []  Threading  []  Winding/Coning  []    Site 9   [] Pistoning / []  Threading  []  Winding/Coning  []    Site 10   [] Pistoning / []  Threading  []  Winding/Coning  []      **The above techniques were used to restore functional range of motion, reduce muscle spasm, and induce healing in the corresponding musculature by means of intramuscular mobilization.   Clean Technique was utilized today while applying the Dry needling treatment. The treatment sites where cleaned with 70% solution of isopropyl alcohol. The PT washed their hands and utilized treatment gloves along with hand  prior to inserting the needles. All needles where removed and discarded in the appropriate sharps container. MD has given verbal and/or written approval for this treatment. **          ** Educated patient on anatomy, trigger point etiology, expectations for TDN (bruising, soreness, etc), outcomes, and recommendations for exercise. **     Therapeutic Exercise and NMR EXR  [x] (00426) Provided verbal/tactile cueing for activities related to strengthening, flexibility, endurance, ROM  for improvements in scapular, scapulothoracic and UE control with self care, reaching, carrying, lifting, house/yardwork, driving/computer work. [x] (36297) Provided verbal/tactile cueing for activities related to improving balance, coordination, kinesthetic sense, posture, motor skill, proprioception  to assist with  scapular, scapulothoracic and UE control with self care, reaching, carrying, lifting, house/yardwork, driving/computer work. Therapeutic Activities:    [] (63040 or 50335) Provided verbal/tactile cueing for activities related to improving balance, coordination, kinesthetic sense, posture, motor skill, proprioception and motor activation to allow for proper function of scapular, scapulothoracic and UE control with self care, carrying, lifting, driving/computer work.      Home Exercise Program:    [x] (35135) Reviewed/Progressed HEP activities related to strengthening, flexibility, endurance, ROM of scapular, scapulothoracic and UE control with self care, reaching, carrying, lifting, house/yardwork, driving/computer work  [] (48787) Reviewed/Progressed HEP activities related to improving balance, coordination, kinesthetic sense, posture, motor skill, proprioception of scapular, scapulothoracic and UE control with self care, reaching, carrying, lifting, house/yardwork, driving/computer work        Less intense:  Access Code: [email or web address]  URL: [email or web address]  Date: 11/28/2022  Prepared by: Guru Yadav    Exercises  Standing Shoulder Flexion AAROM with Dowel - 1 x daily - 7 x weekly - 3 sets - 10 reps  Standing Shoulder Abduction AAROM with Dowel - 1 x daily - 7 x weekly - 3 sets - 10 reps  Standing Isometric Shoulder External Rotation with Doorway and Towel Roll - 1 x daily - 7 x weekly - 3 sets - 10 reps  Standing Shoulder Abduction Slides at Wall - 1 x daily - 7 x weekly - 3 sets - 10 reps  Shoulder extension with resistance - Neutral - 1 x daily - 7 x weekly - 3 sets - 10 reps  Standing Shoulder Flexion AAROM with Swiss Ball - 1 x daily - 7 x weekly - 3 sets - 10 reps  Standing Shoulder Scaption - 1 x daily - 7 x weekly - 3 sets - 10 reps  Supine Chest Stretch on Swiss Ball - 1 x daily - 7 x weekly - 3 sets - 10 reps      Everyday:  Access Code: [email or web address]  URL: [email or web address]  Date: 11/28/2022  Prepared by: Guru Yadav    Exercises  Standing Shoulder External Rotation AAROM with Dowel - 1 x daily - 7 x weekly - 3 sets - 10 reps  Standing Bilateral Shoulder Internal Rotation AAROM with Dowel - 1 x daily - 7 x weekly - 3 sets - 10 reps  Shoulder Flexion Wall Slide with Towel - 1 x daily - 7 x weekly - 3 sets - 10 reps  Shoulder External Rotation with Anchored Resistance - 1 x daily - 7 x weekly - 3 sets - 10 reps  Shoulder Internal Rotation with Resistance - 1 x daily - 7 x weekly - 3 sets - 10 reps  Standing Shoulder Row with Anchored Resistance - 1 x daily - 7 x weekly - 3 sets - 10 reps  Standing Row with Resistance with Anchored Resistance at Chest Height Palms Down - 1 x daily - 7 x weekly - 3 sets - 10 reps  Standing Shoulder Abduction Slides at Wall - 1 x daily - 7 x weekly - 3 sets - 10 reps  Y,I,T - 1 x daily - 7 x weekly - 3 sets - 10 reps  Bird Dog - 1 x daily - 7 x weekly - 3 sets - 10 reps  Standing Low Trap Setting with Resistance at Renee Callahan - 1 x daily - 7 x weekly - 3 sets - 10 reps  Supine Chest Stretch on Swiss Ball - 1 x daily - 7 x weekly - 3 sets - 10 reps          Manual Treatments:  PROM / STM / Oscillations-Mobs:  G-I, II, III, IV (PA's, Inf., Post.)  [x] (86327) Provided manual therapy to mobilize soft tissue/joints of cervical/CT, scapular GHJ and UE for the purpose of modulating pain, promoting relaxation,  increasing ROM, reducing/eliminating soft tissue swelling/inflammation/restriction, improving soft tissue extensibility and allowing for proper ROM for normal function with self care, reaching, carrying, lifting, house/yardwork, driving/computer work    Modalities: G  Trigger point Dry Needling:  fine needle insertion into myofascial trigger points to stimulate a healing response  [] (41933) Needle insertion without injection: 1 or 2 muscles  [x] (12355) Needle insertion without injection: 3 or more muscles  Charges  Timed Code Treatment Minutes: 50   Total Treatment Minutes: 50     [] EVAL (LOW) 18656   [] EVAL (MOD) 75330   [] EVAL (HIGH) 59855   [] RE-EVAL     [] SC(10962) x   [] IONTO  [x] NMR (32936) x 1   [x] VASO  [x] Manual (10689) x 1  [] Other:  [] TA x      [] Mech Traction (68882)  [] ES(attended) (57549)      [] ES (un) (04911):   [x] TDN needle insertion    Modalities:        [] GR/ESU 15 min    [] GR 15 min  [] ESU     [] CP    [] MHP    [] declined     GOALS:  Patient stated goal: playing overhead volleyball  [] Progressing: [] Met: [x] Not Met: [] Adjusted    Therapist goals for Patient:   Short Term Goals: To be achieved in: 2 weeks  1. Independent in HEP and progression per patient tolerance, in order to prevent re-injury. [] Progressing: [x] Met: [] Not Met: [] Adjusted  2. Patient will have a decrease in pain to facilitate improvement in movement, function, and ADLs as indicated by Functional Deficits.   [] Progressing: [x] Met: [] Not Met: [] Adjusted    Long Term Goals: To be achieved in: 12 weeks  1. Disability index score of 10% or better for the FOTO shoulder score to assist with reaching prior level of function. [] Progressing: [] Met: [] Not Met: [] Adjusted  2. Patient will demonstrate increased AROM to WNL and symmetrical to uninvolved limb to allow for proper joint functioning as indicated by patients Functional Deficits. [x] Progressing: [] Met: [] Not Met: [] Adjusted  3. Patient will demonstrate an increase in Strength to 4+/5 to allow for proper functional mobility as indicated by patients Functional Deficits. [] Progressing: [] Met: [x] Not Met: [] Adjusted  4. Patient will be able to lift a 5lbs object overhead with normal scapulohumeral rhythm without increased symptoms or restriction. [] Progressing: [] Met: [x] Not Met: [] Adjusted  5. Pt will be able to perform UE plyometric activity in [place] or [facility] without increased symptoms or restriction to assist her in returning to an active lifestyle and PLOF. [] Progressing: [] Met: [x] Not Met: [] Adjusted     Progression Towards Functional goals:  [x] Patient is progressing as expected towards functional goals listed. [] Progression is slowed due to complexities listed. [] Progression has been slowed due to co-morbidities. [] Plan just implemented, too soon to assess goals progression  [] Other:     ASSESSMENT:  Pt had significant positive response to dry needling and manual techniques today with nearly normalizing flexion AROM with nearly normal scapulohumeral rhythm and also had less pain and feeling of restrction by end of appoint. Reviewed a couple of her new HEP exercises that may be the culprit for the poor response and made some modifications. Overall Progression Towards Functional goals/ Treatment Progress Update:  [x] Patient is progressing as expected towards functional goals listed. [] Progression is slowed due to complexities/Impairments listed.   [] Progression has been slowed due to co-morbidities. [] Plan just implemented, too soon to assess goals progression <30days   [] Goals require adjustment due to lack of progress  [] Patient is not progressing as expected and requires additional follow up with physician  [] Other    Prognosis for POC: [x] Good [] Fair  [] Poor      Patient requires continued skilled intervention: [x] Yes  [] No    Treatment/Activity Tolerance:  [x] Patient able to complete treatment  [] Patient limited by fatigue  [] Patient limited by pain    [] Patient limited by other medical complications  [] Other:     Return to Play: (if applicable)   []  Stage 1: Intro to Strength   []  Stage 2: Return to Run and Strength   []  Stage 3: Return to Jump and Strength   []  Stage 4: Dynamic Strength and Agility   []  Stage 5: Sport Specific Training     []  Ready to Return to Play, Meets All Above Stages   []  Not Ready for Return to Sports   Comments:              PLAN: See eval. Pt to be seen 2 times a week for 12 weeks. [x] Continue per plan of care [] Alter current plan (see comments above)  [] Plan of care initiated [] Hold pending MD visit [] Discharge      Electronically signed by:  Bev Banks PT  EKATERINA Flower Therapist was present, directed the patient's care, made skilled judgement, and was responsible for assessment and treatment of the patient. Note: If patient does not return for scheduled/ recommended follow up visits, this note will serve as a discharge from care along with most recent update on progress.

## 2022-12-05 ENCOUNTER — HOSPITAL ENCOUNTER (OUTPATIENT)
Dept: PHYSICAL THERAPY | Age: 37
Setting detail: THERAPIES SERIES
Discharge: HOME | End: 2022-12-05
Payer: COMMERCIAL

## 2022-12-05 PROCEDURE — 97140 MANUAL THERAPY 1/> REGIONS: CPT

## 2022-12-05 PROCEDURE — 20560 NDL INSJ W/O NJX 1 OR 2 MUSC: CPT

## 2022-12-05 PROCEDURE — 97016 VASOPNEUMATIC DEVICE THERAPY: CPT

## 2022-12-05 PROCEDURE — 97112 NEUROMUSCULAR REEDUCATION: CPT

## 2022-12-05 NOTE — PLAN OF CARE
[facility] and [facility], [place], [address]  Phone: [phone]  Fax [phone]       Physical Therapy Re-Certification Plan of Luigi Smith      Dear Dr. Arie Mckeon ,    We had the pleasure of treating the following patient for physical therapy services at [facility]. A summary of our findings can be found in the updated assessment below. This includes our plan of care. If you have any questions or concerns regarding these findings, please do not hesitate to contact me at the office phone number checked above. Thank you for the referral.     Physician Signature:________________________________Date:__________________  By signing above (or electronic signature), therapists plan is approved by physician    Date Range Of Visits: 22-22  Total Visits to Date: 16  Overall Response to Treatment:   []Patient is responding well to treatment and improvement is noted with regards  to goals   []Patient should continue to improve in reasonable time if they continue HEP   []Patient has plateaued and is no longer responding to skilled PT intervention    []Patient is getting worse and would benefit from return to referring MD   []Patient unable to adhere to initial POC   [x]Other: Pt progress was slower than expected      Recommendation:    [x]Continue PT 2x / wk for 6 weeks.     []Hold PT, pending MD visit               Date:  2022    Patient Name:  Marge Esparza    :  1985  MRN: [id number]  Restrictions/Precautions:    Medical/Treatment Diagnosis Information:  Diagnosis: K33.756P (ICD-10-CM) - Superior labrum anterior-to-posterior (SLAP) tear of right shoulder  Treatment Diagnosis: M25. 611 R shoulder stiffness, M25.411 R shoulder effusion, M62.81 R shoulder weakness, M25.511 R shoulder pain  Insurance/Certification information:  PT Insurance Information: [facility], 90 visits hard limit, no auth  Physician Information:   Dr. Allegra Reynolds  Has the plan of care been signed (Y/N):        []  Yes  [x]  No     Date of Patient follow up with Physician:  11/10/22      Is this a Progress Report:     []  Yes  [x]  No        If Yes:  Date Range for reporting period:  Beginnin22  Ending     Progress report will be due (10 Rx or 30 days whichever is less):        Recertification will be due (POC Duration  / 90 days whichever is less): 22      Visit # Insurance Allowable Auth Required   In-person 17 90 visits (4 used already) []  Yes [x]  No    Telehealth - - []  Yes []  No    Total            Functional Scale: FOTO shoulder score: 36/100 = 36% ability, 64% disability  Date assessed:  22                                  FOTO shoulder score: 59/100 = 59% ability, 41% ability on 10/17/22      Number of Comorbidities:  [x]0     []1-2    []3+    Latex Allergy:  [x]NO      []YES  Preferred Language for Healthcare:   [x]English       []other:      Pain level: 2/10    SUBJECTIVE:   I did not do exercises yesterday because I was busy. So I felt a little stiffer this morning.     OBJECTIVE:   Observation: post glide GHJ mild hypomobility, tight and trigger points in upper trap  Test measurements:       ROM Left AROM Right PROM Right PROM Right  10/24/22 R  22 Right  22 Right   All post-manual R 22   Shoulder Flex 170 35 deg 100 160 deg 140 (post manual 151) 141° pre,  161° post  168° 166°   Shoulder Abd 175 N/a 70 142 deg 125 (post manual 139) 124 ° pre,   157° post 146° 173°   Shoulder ER 65 5 deg 30 70 deg 40 (post manual) 42 35°pre  75° post 43° (painful pinch) 70°   Shoulder IR T4 N/a  67 deg 67 deg          RESTRICTIONS/PRECAUTIONS: s/p R shoulder SLAP and bicep tenodesis 22    Exercises/Interventions:     Therapeutic Ex (97559) Sets/Reps/ sec Notes/CUES   Wand AAROM: ER 45 degrees Supine     Pulleys: flex, abd    Supine flexion AROM    Supine wand ER    S/L ER AROM  S/L ER with abd punch  S/L and supine UE alphabet    S/L abduction AROM     Tball on table AAROM flexion & horiz abd/add    Tband scap retract rows & B ext  Tband high row (at 60deg abd)   Cued for scap dress with retract   Wall crawls: flex, scaption    Standing wand IR hoiz behind back    Standing B ER AROM    Prone scap retract: row,   ext,   horiz abd,   45/90 ER    Resisted ROM PNF D2 supine (~50% resistance)    Ball on plinth    Supine pec stretch over towel roll    Supine flexion  Supine SA upper cut    Arm alphabet: S/L with arm in // to floor    S/L abd with  assisted scap upward rotation     Wall pec stretch  DC painful and not feel stretch   TB ext walkout and ecc    Pt edu: ADL use of arm    Yoga ball pec stretch    Standing Y I T 1 lb weight 8x    Tball up wall NV    Arm Raise series to 90 deg NV    Arm Raise series one arm static at 90 deg other raising overhead NV    Tband D1 flex, D2 ext Yellow NV    Wall walks (scap depress) Yellow NV            Manual Intervention (65623) Total: 23min + TDN    PROM: flexion, abd, IR & ER @ 45 deg and @ 90 deg    Jt Mobs: posterior & : grad II-III    Pec minor stretch  2x 30 sec    STM to subscap and lat (with movement)    Supine pec stretch over side of table    Dry needling assessment and tx 10 min    Cont relax SL scap dep/add Arm in abd   Abd stretch with pressure on bicep tendon 5 min    STM/trigger point:  subscap   10 min    Seated MWM shoulder flexion, GH lateral glide & scap upward rot    Shoulder abd with GH post glide 5 min        NMR re-education (30201) CUES NEEDED   Lat pull down stool sit Cues to prep with scap then arms, reverse on release   Supine 90° shoulder flexion  perturbations    Bird dog    Quadruped SA shoulder taps     Tactile cue for SA   Low trap setting on weight machine Wall one for home   Bosu plank hold  Bosu with feet walks  Bosu with hip flexion 20 sec hold 5x walk in and out, 5x hip flexion R and L Therapeutic Activity (84486)                    IFC    Game Ready 15 min      Consent signed 11/21/2022 and precautions addressed. See media tab. Needle Size Technique Notes IES   Site 1 .03f37vi [] Pistoning / []  Threading  [x]  Winding/Coning  []    Site 2 .3x40mm [] Pistoning / [x]  Threading  [x]  Winding/Coning []    Site 3 Right upper trap x1 .92s72yt [] Pistoning / [x]  Threading  []  Winding/Coning Twitch response, major release []    Site 4 Mid-trap x2 .97r66jn [] Pistoning / [x]  Threading  [x]  Winding/Coning Pulling skin away and using needle in horizontal to torso direction []    Site 5 Low trap x 1 .18g60jy [] Pistoning / [x]  Threading  [x]  Winding/Coning Pulling skin away and using needle in horizontal to torso direction []    Site 6   [] Pistoning / []  Threading  []  Winding/Coning  []    Site 7   [] Pistoning / []  Threading  []  Winding/Coning  []    Site 8   [] Pistoning / []  Threading  []  Winding/Coning  []    Site 9   [] Pistoning / []  Threading  []  Winding/Coning  []    Site 10   [] Pistoning / []  Threading  []  Winding/Coning  []      **The above techniques were used to restore functional range of motion, reduce muscle spasm, and induce healing in the corresponding musculature by means of intramuscular mobilization. Clean Technique was utilized today while applying the Dry needling treatment. The treatment sites where cleaned with 70% solution of isopropyl alcohol. The PT washed their hands and utilized treatment gloves along with hand  prior to inserting the needles. All needles where removed and discarded in the appropriate sharps container. MD has given verbal and/or written approval for this treatment. **          ** Educated patient on anatomy, trigger point etiology, expectations for TDN (bruising, soreness, etc), outcomes, and recommendations for exercise.  **     Therapeutic Exercise and NMR EXR  [x] (47212) Provided verbal/tactile cueing for activities related to strengthening, flexibility, endurance, ROM  for improvements in scapular, scapulothoracic and UE control with self care, reaching, carrying, lifting, house/yardwork, driving/computer work. [x] (73770) Provided verbal/tactile cueing for activities related to improving balance, coordination, kinesthetic sense, posture, motor skill, proprioception  to assist with  scapular, scapulothoracic and UE control with self care, reaching, carrying, lifting, house/yardwork, driving/computer work. Therapeutic Activities:    [] (59942 or 37036) Provided verbal/tactile cueing for activities related to improving balance, coordination, kinesthetic sense, posture, motor skill, proprioception and motor activation to allow for proper function of scapular, scapulothoracic and UE control with self care, carrying, lifting, driving/computer work. Home Exercise Program:    [x] (74120) Reviewed/Progressed HEP activities related to strengthening, flexibility, endurance, ROM of scapular, scapulothoracic and UE control with self care, reaching, carrying, lifting, house/yardwork, driving/computer work  [] (91836) Reviewed/Progressed HEP activities related to improving balance, coordination, kinesthetic sense, posture, motor skill, proprioception of scapular, scapulothoracic and UE control with self care, reaching, carrying, lifting, house/yardwork, driving/computer work        Less intense:  Access Code: [email or web address]  URL: [email or web address]  Date: 11/28/2022  Prepared by: Edita Fernandez    Exercises  Standing Shoulder Flexion AAROM with Dowel - 1 x daily - 7 x weekly - 3 sets - 10 reps  Standing Shoulder Abduction AAROM with Dowel - 1 x daily - 7 x weekly - 3 sets - 10 reps  Standing Isometric Shoulder External Rotation with Doorway and Towel Roll - 1 x daily - 7 x weekly - 3 sets - 10 reps  Standing Shoulder Abduction Slides at Wall - 1 x daily - 7 x weekly - 3 sets - 10 reps  Shoulder extension with resistance - Neutral - 1 x daily - 7 x weekly - 3 sets - 10 reps  Standing Shoulder Flexion AAROM with Swiss Ball - 1 x daily - 7 x weekly - 3 sets - 10 reps  Standing Shoulder Scaption - 1 x daily - 7 x weekly - 3 sets - 10 reps  Supine Chest Stretch on Swiss Ball - 1 x daily - 7 x weekly - 3 sets - 10 reps      Everyday:  Access Code: [email or web address]  URL: Modify/  Date: 11/28/2022  Prepared by: Johnna Orange    Exercises  Standing Shoulder External Rotation AAROM with Dowel - 1 x daily - 7 x weekly - 3 sets - 10 reps  Standing Bilateral Shoulder Internal Rotation AAROM with Dowel - 1 x daily - 7 x weekly - 3 sets - 10 reps  Shoulder Flexion Wall Slide with Towel - 1 x daily - 7 x weekly - 3 sets - 10 reps  Shoulder External Rotation with Anchored Resistance - 1 x daily - 7 x weekly - 3 sets - 10 reps  Shoulder Internal Rotation with Resistance - 1 x daily - 7 x weekly - 3 sets - 10 reps  Standing Shoulder Row with Anchored Resistance - 1 x daily - 7 x weekly - 3 sets - 10 reps  Standing Row with Resistance with Anchored Resistance at Chest Height Palms Down - 1 x daily - 7 x weekly - 3 sets - 10 reps  Standing Shoulder Abduction Slides at Wall - 1 x daily - 7 x weekly - 3 sets - 10 reps  Y,I,T - 1 x daily - 7 x weekly - 3 sets - 10 reps  Bird Dog - 1 x daily - 7 x weekly - 3 sets - 10 reps  Standing Low Trap Setting with Resistance at [address] - 1 x daily - 7 x weekly - 3 sets - 10 reps  Supine Chest Stretch on Swiss Ball - 1 x daily - 7 x weekly - 3 sets - 10 reps          Manual Treatments:  PROM / STM / Oscillations-Mobs:  G-I, II, III, IV (PA's, Inf., Post.)  [x] (01834) Provided manual therapy to mobilize soft tissue/joints of cervical/CT, scapular GHJ and UE for the purpose of modulating pain, promoting relaxation,  increasing ROM, reducing/eliminating soft tissue swelling/inflammation/restriction, improving soft tissue extensibility and allowing for proper ROM for normal function with self care, reaching, carrying, lifting, house/yardwork, driving/computer work    Modalities: Game Ready with IFC to address inflammation and pain x 15 min    Trigger point Dry Needling:  fine needle insertion into myofascial trigger points to stimulate a healing response  [x] (57757) Needle insertion without injection: 1 or 2 muscles  [] (34474) Needle insertion without injection: 3 or more muscles  Charges  Timed Code Treatment Minutes: 45   Total Treatment Minutes: 60     [] EVAL (LOW) 48501   [] EVAL (MOD) 12335   [] EVAL (HIGH) 52350   [] RE-EVAL     [] VE(45090) x   [] IONTO  [x] NMR (95727) x 1  [x] VASO  [x] Manual (71077) x  1 [] Other:  [] TA x      [] Mech Traction (61161)  [] ES(attended) (68143)      [] ES (un) (53589):   [x] TDN needle insertion    Modalities:        [] GR/ESU 15 min    [] GR 15 min  [] ESU     [] CP    [] MHP    [] declined     GOALS:  Patient stated goal: playing overhead volleyball  [] Progressing: [] Met: [x] Not Met: [] Adjusted    Therapist goals for Patient:   Short Term Goals: To be achieved in: 2 weeks  1. Independent in HEP and progression per patient tolerance, in order to prevent re-injury. [] Progressing: [x] Met: [] Not Met: [] Adjusted  2. Patient will have a decrease in pain to facilitate improvement in movement, function, and ADLs as indicated by Functional Deficits. [] Progressing: [x] Met: [] Not Met: [] Adjusted    Long Term Goals: To be achieved in: 12 weeks  1. Disability index score of 10% or better for the FOTO shoulder score to assist with reaching prior level of function. [] Progressing: [] Met: [] Not Met: [] Adjusted  2. Patient will demonstrate increased AROM to WNL and symmetrical to uninvolved limb to allow for proper joint functioning as indicated by patients Functional Deficits. [x] Progressing: [] Met: [] Not Met: [] Adjusted  3. Patient will demonstrate an increase in Strength to 4+/5 to allow for proper functional mobility as indicated by patients Functional Deficits.    [] Progressing: [] Met: [x] Not Met: [] Adjusted  4. Patient will be able to lift a 5lbs object overhead with normal scapulohumeral rhythm without increased symptoms or restriction. [] Progressing: [] Met: [x] Not Met: [] Adjusted  5. Pt will be able to perform UE plyometric activity in [place] or [facility] without increased symptoms or restriction to assist her in returning to an active lifestyle and PLOF. [] Progressing: [] Met: [x] Not Met: [] Adjusted     Progression Towards Functional goals:  [x] Patient is progressing as expected towards functional goals listed. [] Progression is slowed due to complexities listed. [] Progression has been slowed due to co-morbidities. [] Plan just implemented, too soon to assess goals progression  [] Other:     ASSESSMENT:  Pt had significantly less pain with AROM in abd and improved scapulohumeral rhythm after the DTN and other manual work. Pt able to transition to some WB UE work today without pain. Pt's major limitation are in overhead ranges, scapulohumeral rhythm and general strength. Overall Progression Towards Functional goals/ Treatment Progress Update:  [x] Patient is progressing as expected towards functional goals listed. [] Progression is slowed due to complexities/Impairments listed. [] Progression has been slowed due to co-morbidities.   [] Plan just implemented, too soon to assess goals progression <30days   [] Goals require adjustment due to lack of progress  [] Patient is not progressing as expected and requires additional follow up with physician  [] Other    Prognosis for POC: [x] Good [] Fair  [] Poor      Patient requires continued skilled intervention: [x] Yes  [] No    Treatment/Activity Tolerance:  [x] Patient able to complete treatment  [] Patient limited by fatigue  [] Patient limited by pain    [] Patient limited by other medical complications  [] Other:     Return to Play: (if applicable)   []  Stage 1: Intro to Strength   []  Stage 2: Return to Run and Strength   []  Stage 3: Return to Jump and Strength   []  Stage 4: Dynamic Strength and Agility   []  Stage 5: Sport Specific Training     []  Ready to Return to Play, Meets All Above Stages   []  Not Ready for Return to Sports   Comments:              PLAN: See eval. Pt to be seen 2 times a week for 12 weeks. [x] Continue per plan of care [] Alter current plan (see comments above)  [] Plan of care initiated [] Hold pending MD visit [] Discharge      Electronically signed by:  ROBINSON Bowden Ra, SPT Therapist was present, directed the patient's care, made skilled judgement, and was responsible for assessment and treatment of the patient. Note: If patient does not return for scheduled/ recommended follow up visits, this note will serve as a discharge from care along with most recent update on progress.

## 2022-12-07 ENCOUNTER — HOSPITAL ENCOUNTER (OUTPATIENT)
Dept: PHYSICAL THERAPY | Age: 37
Setting detail: THERAPIES SERIES
Discharge: HOME | End: 2022-12-07
Payer: COMMERCIAL

## 2022-12-07 PROCEDURE — 97110 THERAPEUTIC EXERCISES: CPT

## 2022-12-07 PROCEDURE — 97140 MANUAL THERAPY 1/> REGIONS: CPT

## 2022-12-07 NOTE — FLOWSHEET NOTE
The [address] [place] [address],[facility] [address], [address]  Phone: [phone]  Fax [phone]      Date:  2022    Patient Name:  Catrachita Mary    :  1985  MRN: [id number]  Restrictions/Precautions:    Medical/Treatment Diagnosis Information:  Diagnosis: G08.647S (ICD-10-CM) - Superior labrum anterior-to-posterior (SLAP) tear of right shoulder  Treatment Diagnosis: M25. 611 R shoulder stiffness, M25.411 R shoulder effusion, M62.81 R shoulder weakness, M25.511 R shoulder pain  Insurance/Certification information:  PT Insurance Information: [facility], 90 visits hard limit, no auth  Physician Information:   Dr. Ernie Wilson  Has the plan of care been signed (Y/N):        []  Yes  [x]  No     Date of Patient follow up with Physician:  11/10/22      Is this a Progress Report:     []  Yes  [x]  No        If Yes:  Date Range for reporting period:  Beginnin22  Ending     Progress report will be due (10 Rx or 30 days whichever is less):  00/15/27      Recertification will be due (POC Duration  / 90 days whichever is less): 23      Visit # Insurance Allowable Auth Required   In-person 18 90 visits (4 used already) []  Yes [x]  No    Telehealth - - []  Yes []  No    Total            Functional Scale: FOTO shoulder score: 36/100 = 36% ability, 64% disability  Date assessed:  22                                  FOTO shoulder score: 59/100 = 59% ability, 41% ability on 10/17/22      Number of Comorbidities:  [x]0     []1-2    []3+    Latex Allergy:  [x]NO      []YES  Preferred Language for Healthcare:   [x]English       []other:      Pain level: 2/10    SUBJECTIVE:   Pt says that overall she feels that things are getting better with her range of motion except for the same 20 deg of mid-range abd motion still \"catches\". She says the HEP is going better and not having such a sore response anymore but still feels like a good challenge. OBJECTIVE:   Observation: post glide GHJ mild hypomobility, tight and trigger points in upper trap  Test measurements:       ROM Left AROM Right PROM Right PROM Right  10/24/22 R  11/7/22 Right  11/9/22 Right 11/16  All post-manual R 11/28/22   Shoulder Flex 170 35 deg 100 160 deg 140 (post manual 151) 141° pre,  161° post  168° 166°   Shoulder Abd 175 N/a 70 142 deg 125 (post manual 139) 124 ° pre,   157° post 146° 173°   Shoulder ER 65 5 deg 30 70 deg 40 (post manual) 42 35°pre  75° post 43° (painful pinch) 70°   Shoulder IR T4 N/a  67 deg 67 deg          RESTRICTIONS/PRECAUTIONS: s/p R shoulder SLAP and bicep tenodesis 9/20/22    Exercises/Interventions:     Therapeutic Ex (79787) Sets/Reps/ sec Notes/CUES   Wand AAROM: ER 45 degrees Supine     Pulleys: flex, abd    Supine flexion AROM    Supine wand ER    S/L ER AROM  S/L ER with abd punch  S/L and supine UE alphabet    S/L abduction AROM     Tball on table AAROM flexion & horiz abd/add    Tband scap retract rows & B ext  Tband high row (at 60deg abd)   Cued for scap dress with retract   Wall crawls: flex, scaption    Standing wand IR hoiz behind back    Standing B ER  Green 2x15    Prone scap retract: row,   ext,   horiz abd,   45/90 ER    Resisted ROM PNF D2 supine (~50% resistance)    Ball on plinth    Supine pec stretch over towel roll    Supine flexion  Supine SA upper cut    Arm alphabet: S/L with arm in // to floor    S/L abd with  assisted scap upward rotation     Wall pec stretch  DC painful and not feel stretch   TB ext walkout and ecc    Pt edu: ADL use of arm    Yoga ball pec stretch    Standing Y I T 1 lb weight 8x    Tball up wall NV    Arm Raise series to 90 deg NV    Arm Raise series one arm static at 90 deg other raising overhead NV    Tband D1 flex, D2 ext red 3x10ea    Wall walks (scap depress) Yellow NV    Ball on wall circles: flex, abd 2x10cw/ccw Red med ball   Ball on wall taps: W & clock 2x5ea Green med ball   Quick AROM flex,& abd 2x10    Overhead taps with stick 2x10    [place] vertical behind back 2x10                   Manual Intervention (21173) Total: 29min     PROM: flexion, abd, IR & ER @ 45 deg and @ 90 deg 5 min    Jt Mobs: posterior & : grad III 2 min    Pec minor stretch, manual 2x 30 sec    STM to subscap and lat (with movement)    Supine pec stretch over side of table    Dry needling assessment and tx    Cont relax SL scap dep/add Arm in abd   Abd stretch with pressure on bicep tendon 1 min    STM/trigger point: upper trap, mid trap, rhomboids, scalene subscap, lat dorsi  20 min    Seated MWM shoulder flexion, GH lateral glide & scap upward rot    Shoulder abd with [address] post glide        NMR re-education (20084) CUES NEEDED   Lat pull down stool sit Cues to prep with scap then arms, reverse on release   Supine 90° shoulder flexion  perturbations    Bird dog    Quadruped SA shoulder taps     Tactile cue for SA   Low trap setting on weight machine Wall one for home   Bosu plank hold  Bosu with feet walks  Bosu with hip flexion                               Therapeutic Activity (67223)                    IFC    Game Ready      Consent signed 11/21/2022 and precautions addressed. See media tab.    Needle Size Technique Notes IES   Site 1 .37o13aa [] Pistoning / []  Threading  [x]  Winding/Coning  []    Site 2 .3x40mm [] Pistoning / [x]  Threading  [x]  Winding/Coning []    Site 3 .22n58xu [] Pistoning / [x]  Threading  []  Winding/Coning Twitch response, major release []    Site 4 .34f87if [] Pistoning / [x]  Threading  [x]  Winding/Coning Pulling skin away and using needle in horizontal to torso direction []    Site 5 .[id number] [] Pistoning / [x]  Threading  [x]  Winding/Coning Pulling skin away and using needle in horizontal to torso direction []    Site 6   [] Pistoning / []  Threading  []  Winding/Coning  []    Site 7   [] Pistoning / []  Threading  []  Winding/Coning  []    Site 8   [] Pistoning / []  Threading  [] Winding/Coning  []    Site 9   [] Pistoning / []  Threading  []  Winding/Coning  []    Site 10   [] Pistoning / []  Threading  []  Winding/Coning  []      **The above techniques were used to restore functional range of motion, reduce muscle spasm, and induce healing in the corresponding musculature by means of intramuscular mobilization. Clean Technique was utilized today while applying the Dry needling treatment. The treatment sites where cleaned with 70% solution of isopropyl alcohol. The PT washed their hands and utilized treatment gloves along with hand  prior to inserting the needles. All needles where removed and discarded in the appropriate sharps container. MD has given verbal and/or written approval for this treatment. **          ** Educated patient on anatomy, trigger point etiology, expectations for TDN (bruising, soreness, etc), outcomes, and recommendations for exercise. **     Therapeutic Exercise and NMR EXR  [x] (74961) Provided verbal/tactile cueing for activities related to strengthening, flexibility, endurance, ROM  for improvements in scapular, scapulothoracic and UE control with self care, reaching, carrying, lifting, house/yardwork, driving/computer work.    [] (01237) Provided verbal/tactile cueing for activities related to improving balance, coordination, kinesthetic sense, posture, motor skill, proprioception  to assist with  scapular, scapulothoracic and UE control with self care, reaching, carrying, lifting, house/yardwork, driving/computer work. Therapeutic Activities:    [] (33042 or 56890) Provided verbal/tactile cueing for activities related to improving balance, coordination, kinesthetic sense, posture, motor skill, proprioception and motor activation to allow for proper function of scapular, scapulothoracic and UE control with self care, carrying, lifting, driving/computer work.      Home Exercise Program:    [] (09512) Reviewed/Progressed HEP activities related to strengthening, flexibility, endurance, ROM of scapular, scapulothoracic and UE control with self care, reaching, carrying, lifting, house/yardwork, driving/computer work  [] (37594) Reviewed/Progressed HEP activities related to improving balance, coordination, kinesthetic sense, posture, motor skill, proprioception of scapular, scapulothoracic and UE control with self care, reaching, carrying, lifting, house/yardwork, driving/computer work        Less intense:  Access Code: [email or web address]  URL: [employer]/  Date: 11/28/2022  Prepared by: Elias Gates    Exercises  Standing Shoulder Flexion AAROM with Dowel - 1 x daily - 7 x weekly - 3 sets - 10 reps  Standing Shoulder Abduction AAROM with Dowel - 1 x daily - 7 x weekly - 3 sets - 10 reps  Standing Isometric Shoulder External Rotation with Doorway and Towel Roll - 1 x daily - 7 x weekly - 3 sets - 10 reps  Standing Shoulder Abduction Slides at Wall - 1 x daily - 7 x weekly - 3 sets - 10 reps  Shoulder extension with resistance - Neutral - 1 x daily - 7 x weekly - 3 sets - 10 reps  Standing Shoulder Flexion AAROM with Swiss Ball - 1 x daily - 7 x weekly - 3 sets - 10 reps  Standing Shoulder Scaption - 1 x daily - 7 x weekly - 3 sets - 10 reps  Supine Chest Stretch on Swiss Ball - 1 x daily - 7 x weekly - 3 sets - 10 reps      Everyday:  Access Code: [email or web address]  URL: [employer]/  Date: 11/28/2022  Prepared by: Elias Yajaira    Exercises  Standing Shoulder External Rotation AAROM with Dowel - 1 x daily - 7 x weekly - 3 sets - 10 reps  Standing Bilateral Shoulder Internal Rotation AAROM with Dowel - 1 x daily - 7 x weekly - 3 sets - 10 reps  Shoulder Flexion Wall Slide with Towel - 1 x daily - 7 x weekly - 3 sets - 10 reps  Shoulder External Rotation with Anchored Resistance - 1 x daily - 7 x weekly - 3 sets - 10 reps  Shoulder Internal Rotation with Resistance - 1 x daily - 7 x weekly - 3 sets - 10 reps  Standing Shoulder Row with Anchored To be achieved in: 2 weeks  1. Independent in HEP and progression per patient tolerance, in order to prevent re-injury. [] Progressing: [x] Met: [] Not Met: [] Adjusted  2. Patient will have a decrease in pain to facilitate improvement in movement, function, and ADLs as indicated by Functional Deficits. [] Progressing: [x] Met: [] Not Met: [] Adjusted    Long Term Goals: To be achieved in: 12 weeks  1. Disability index score of 10% or better for the FOTO shoulder score to assist with reaching prior level of function. [] Progressing: [] Met: [] Not Met: [] Adjusted  2. Patient will demonstrate increased AROM to WNL and symmetrical to uninvolved limb to allow for proper joint functioning as indicated by patients Functional Deficits. [x] Progressing: [] Met: [] Not Met: [] Adjusted  3. Patient will demonstrate an increase in Strength to 4+/5 to allow for proper functional mobility as indicated by patients Functional Deficits. [] Progressing: [] Met: [x] Not Met: [] Adjusted  4. Patient will be able to lift a 5lbs object overhead with normal scapulohumeral rhythm without increased symptoms or restriction. [] Progressing: [] Met: [x] Not Met: [] Adjusted  5. Pt will be able to perform UE plyometric activity in [place] or [facility] without increased symptoms or restriction to assist her in returning to an active lifestyle and PLOF. [] Progressing: [] Met: [x] Not Met: [] Adjusted     Progression Towards Functional goals:  [x] Patient is progressing as expected towards functional goals listed. [] Progression is slowed due to complexities listed. [] Progression has been slowed due to co-morbidities. [] Plan just implemented, too soon to assess goals progression  [] Other:     ASSESSMENT:  Continue to use manual techniques combined with active stretching, ROM and strengthening in the RTC arch of motion to address remaining deficits in motion, scapulohumeral rhythm and overhead strength.       Overall Progression Towards Functional goals/ Treatment Progress Update:  [x] Patient is progressing as expected towards functional goals listed. [] Progression is slowed due to complexities/Impairments listed. [] Progression has been slowed due to co-morbidities. [] Plan just implemented, too soon to assess goals progression <30days   [] Goals require adjustment due to lack of progress  [] Patient is not progressing as expected and requires additional follow up with physician  [] Other    Prognosis for POC: [x] Good [] Fair  [] Poor      Patient requires continued skilled intervention: [x] Yes  [] No    Treatment/Activity Tolerance:  [x] Patient able to complete treatment  [] Patient limited by fatigue  [] Patient limited by pain    [] Patient limited by other medical complications  [] Other:     Return to Play: (if applicable)   []  Stage 1: Intro to Strength   []  Stage 2: Return to Run and Strength   []  Stage 3: Return to Jump and Strength   []  Stage 4: Dynamic Strength and Agility   []  Stage 5: Sport Specific Training     []  Ready to Return to Play, Meets All Above Stages   []  Not Ready for Return to Sports   Comments:              PLAN: See eval. Pt to be seen 2 times a week for 12 weeks. [x] Continue per plan of care [] Alter current plan (see comments above)  [] Plan of care initiated [] Hold pending MD visit [] Discharge      Electronically signed by:  Demetra Wiseman PT         Note: If patient does not return for scheduled/ recommended follow up visits, this note will serve as a discharge from care along with most recent update on progress.

## 2022-12-12 ENCOUNTER — HOSPITAL ENCOUNTER (OUTPATIENT)
Dept: PHYSICAL THERAPY | Age: 37
Setting detail: THERAPIES SERIES
Discharge: HOME | End: 2022-12-12
Payer: COMMERCIAL

## 2022-12-12 PROCEDURE — 97140 MANUAL THERAPY 1/> REGIONS: CPT

## 2022-12-12 PROCEDURE — 97110 THERAPEUTIC EXERCISES: CPT

## 2022-12-12 NOTE — FLOWSHEET NOTE
The [address] [place] [address],[address], [address], [place] [address], [address]  Phone: [phone]  Fax [phone]      Date:  2022    Patient Name:  Sylvia Workman    :  1985  MRN: [id number]  Restrictions/Precautions:    Medical/Treatment Diagnosis Information:  Diagnosis: J96.411Q (ICD-10-CM) - Superior labrum anterior-to-posterior (SLAP) tear of right shoulder  Treatment Diagnosis: M25. 611 R shoulder stiffness, M25.411 R shoulder effusion, M62.81 R shoulder weakness, M25.511 R shoulder pain  Insurance/Certification information:  PT Insurance Information: [facility], 90 visits hard limit, no auth  Physician Information:   Dr. Lam Houston  Has the plan of care been signed (Y/N):        []  Yes  [x]  No     Date of Patient follow up with Physician:  11/10/22      Is this a Progress Report:     []  Yes  [x]  No        If Yes:  Date Range for reporting period:  Beginnin22  Ending     Progress report will be due (10 Rx or 30 days whichever is less):  40/48/15      Recertification will be due (POC Duration  / 90 days whichever is less): 23      Visit # Insurance Allowable Auth Required   In-person 19 90 visits (4 used already) []  Yes [x]  No    Telehealth - - []  Yes []  No    Total            Functional Scale: FOTO shoulder score: 36/100 = 36% ability, 64% disability  Date assessed:  22                                  FOTO shoulder score: 59/100 = 59% ability, 41% ability on 10/17/22      Number of Comorbidities:  [x]0     []1-2    []3+    Latex Allergy:  [x]NO      []YES  Preferred Language for Healthcare:   [x]English       []other:      Pain level: 0/10    SUBJECTIVE:   Pt just got in from [place] around midnight last night after major delays in her flight so she is really tired thismorning and the shoulder feels a little more stiff this morning after holding her kids in the airport a lot yesterday.  She says the HEP is starting to feel easier to do and would like a progression.      OBJECTIVE:   Observation: R fwd shoulder posture more than normal with multiple pec trigger points  Test measurements:       ROM Left AROM Right PROM Right PROM Right  10/24/22 R  11/7/22 Right  11/9/22 Right 11/16  All post-manual R 11/28/22   Shoulder Flex 170 35 deg 100 160 deg 140 (post manual 151) 141° pre,  161° post  168° 166°   Shoulder Abd 175 N/a 70 142 deg 125 (post manual 139) 124 ° pre,   157° post 146° 173°   Shoulder ER 65 5 deg 30 70 deg 40 (post manual) 42 35°pre  75° post 43° (painful pinch) 70°   Shoulder IR T4 N/a  67 deg 67 deg          RESTRICTIONS/PRECAUTIONS: s/p R shoulder SLAP and bicep tenodesis 9/20/22    Exercises/Interventions:     Therapeutic Ex (51734) Sets/Reps/ sec Notes/CUES   Wand AAROM: ER 45 degrees Supine     Pulleys: flex, abd    Supine flexion AROM    Supine wand ER    S/L ER AROM  S/L ER with abd punch  S/L and supine UE alphabet    S/L abduction AROM     Tball on table AAROM flexion & horiz abd/add    Tband scap retract rows & B ext  Tband high row (at 60deg abd)   Cued for scap dress with retract   Wall crawls: flex, scaption    Standing wand IR hoiz behind back    Standing B ER     Prone scap retract: row,   ext,   horiz abd,   45/90 ER    Resisted ROM PNF D2 supine (~50% resistance)    Ball on plinth    Supine pec stretch over towel roll    Supine flexion  Supine SA upper cut    Arm alphabet: S/L with arm in // to floor    S/L abd with  assisted scap upward rotation     Wall pec stretch  DC painful and not feel stretch   TB ext walkout and ecc    Pt edu: ADL use of arm    Yoga ball pec stretch    Standing Y I T 1 lb weight 8x    Tball up wall NV    Arm Raise series to 90 deg NV    Arm Raise series one arm static at 90 deg other raising overhead 2# 2x2x5    Tband D1 flex, D2 ext; quick red 2x10ea    Tband 90/90 ER & IR Red 2x10ea    UE walk walks OVL 2x2 laps    Ball on wall circles: flex, abd Red med ball   Ball on wall direction []    Site 6   [] Pistoning / []  Threading  []  Winding/Coning  []    Site 7   [] Pistoning / []  Threading  []  Winding/Coning  []    Site 8   [] Pistoning / []  Threading  []  Winding/Coning  []    Site 9   [] Pistoning / []  Threading  []  Winding/Coning  []    Site 10   [] Pistoning / []  Threading  []  Winding/Coning  []      **The above techniques were used to restore functional range of motion, reduce muscle spasm, and induce healing in the corresponding musculature by means of intramuscular mobilization. Clean Technique was utilized today while applying the Dry needling treatment. The treatment sites where cleaned with 70% solution of isopropyl alcohol. The PT washed their hands and utilized treatment gloves along with hand  prior to inserting the needles. All needles where removed and discarded in the appropriate sharps container. MD has given verbal and/or written approval for this treatment. **          ** Educated patient on anatomy, trigger point etiology, expectations for TDN (bruising, soreness, etc), outcomes, and recommendations for exercise. **     Therapeutic Exercise and NMR EXR  [x] (59852) Provided verbal/tactile cueing for activities related to strengthening, flexibility, endurance, ROM  for improvements in scapular, scapulothoracic and UE control with self care, reaching, carrying, lifting, house/yardwork, driving/computer work.    [] (10969) Provided verbal/tactile cueing for activities related to improving balance, coordination, kinesthetic sense, posture, motor skill, proprioception  to assist with  scapular, scapulothoracic and UE control with self care, reaching, carrying, lifting, house/yardwork, driving/computer work.     Therapeutic Activities:    [] (06636 or 10090) Provided verbal/tactile cueing for activities related to improving balance, coordination, kinesthetic sense, posture, motor skill, proprioception and motor activation to allow for proper function of scapular, scapulothoracic and UE control with self care, carrying, lifting, driving/computer work. Home Exercise Program:    [x] (41883) Reviewed/Progressed HEP activities related to strengthening, flexibility, endurance, ROM of scapular, scapulothoracic and UE control with self care, reaching, carrying, lifting, house/yardwork, driving/computer work  [] (82321) Reviewed/Progressed HEP activities related to improving balance, coordination, kinesthetic sense, posture, motor skill, proprioception of scapular, scapulothoracic and UE control with self care, reaching, carrying, lifting, house/yardwork, driving/computer work      Access Code: [email or web address]  URL: [email or web address]  Date: 12/12/2022  Prepared by: Guru Yadav    Exercises  Y,I,T - 1 x daily - 7 x weekly - 2-3 sets - 10 reps  Horizontal Wall Walk with Resistance - 1 x daily - 7 x weekly - 2-3 sets - 15-20 reps  Standing Single Arm Shoulder PNF D1 Flexion with Anchored Resistance - 1 x daily - 7 x weekly - 2-3 sets - 10 reps  Standing Shoulder Single Arm PNF D2 Flexion with Anchored Resistance - 1 x daily - 7 x weekly - 2-3 sets - 10 reps  Standing Single Arm Shoulder External Rotation in Abduction with Anchored Resistance - 1 x daily - 7 x weekly - 2-3 sets - 10 reps  Standing Single Arm Shoulder Internal Rotation in Abduction with Anchored Resistance - 1 x daily - 7 x weekly - 2-3 sets - 10 reps  Standing Bicep Curls Supinated with Dumbbells - 1 x daily - 7 x weekly - 2-3 sets - 10 reps  Standing Tricep Extensions with Resistance - 1 x daily - 7 x weekly - 2-3 sets - 10 reps      Manual Treatments:  PROM / STM / Oscillations-Mobs:  G-I, II, III, IV (PA's, Inf., Post.)  [x] (06849) Provided manual therapy to mobilize soft tissue/joints of cervical/CT, scapular GHJ and UE for the purpose of modulating pain, promoting relaxation,  increasing ROM, reducing/eliminating soft tissue swelling/inflammation/restriction, improving soft tissue extensibility and allowing for proper ROM for normal function with self care, reaching, carrying, lifting, house/yardwork, driving/computer work    Modalities:   Trigger point Dry Needling:  fine needle insertion into myofascial trigger points to stimulate a healing response  [] (86480) Needle insertion without injection: 1 or 2 muscles  [] (48024) Needle insertion without injection: 3 or more muscles    Charges  Timed Code Treatment Minutes: 45   Total Treatment Minutes: 50     [] EVAL (LOW) 73593   [] EVAL (MOD) 24760   [] EVAL (HIGH) 17244   [] RE-EVAL     [x] DX(43766) x 2  [] IONTO  [] NMR (11185) x   [x] VASO  [x] Manual (89964) x  1 [] Other:  [] TA x      [] Mech Traction (72945)  [] ES(attended) (38352)      [] ES (un) (49138):   [] TDN needle insertion    Modalities:        [] GR/ESU 15 min    [] GR 15 min  [] ESU     [] CP    [] MHP    [] declined     GOALS:  Patient stated goal: playing overhead volleyball  [] Progressing: [] Met: [x] Not Met: [] Adjusted    Therapist goals for Patient:   Short Term Goals: To be achieved in: 2 weeks  1. Independent in HEP and progression per patient tolerance, in order to prevent re-injury. [] Progressing: [x] Met: [] Not Met: [] Adjusted  2. Patient will have a decrease in pain to facilitate improvement in movement, function, and ADLs as indicated by Functional Deficits. [] Progressing: [x] Met: [] Not Met: [] Adjusted    Long Term Goals: To be achieved in: 12 weeks  1. Disability index score of 10% or better for the FOTO shoulder score to assist with reaching prior level of function. [] Progressing: [] Met: [] Not Met: [] Adjusted  2. Patient will demonstrate increased AROM to WNL and symmetrical to uninvolved limb to allow for proper joint functioning as indicated by patients Functional Deficits. [x] Progressing: [] Met: [] Not Met: [] Adjusted  3.  Patient will demonstrate an increase in Strength to 4+/5 to allow for proper functional mobility as indicated by patients Functional Deficits. [] Progressing: [] Met: [x] Not Met: [] Adjusted  4. Patient will be able to lift a 5lbs object overhead with normal scapulohumeral rhythm without increased symptoms or restriction. [] Progressing: [] Met: [x] Not Met: [] Adjusted  5. Pt will be able to perform UE plyometric activity in [place] or [facility] without increased symptoms or restriction to assist her in returning to an active lifestyle and PLOF. [] Progressing: [] Met: [x] Not Met: [] Adjusted     Progression Towards Functional goals:  [x] Patient is progressing as expected towards functional goals listed. [] Progression is slowed due to complexities listed. [] Progression has been slowed due to co-morbidities. [] Plan just implemented, too soon to assess goals progression  [] Other:     ASSESSMENT:  Progressed pt's HEP to do more overhead strengthening where deficits remain and continuing to work on posterior chain of the shoulder girdle to combat her forward shoulder posture and impingement symptoms. Overall Progression Towards Functional goals/ Treatment Progress Update:  [x] Patient is progressing as expected towards functional goals listed. [] Progression is slowed due to complexities/Impairments listed. [] Progression has been slowed due to co-morbidities.   [] Plan just implemented, too soon to assess goals progression <30days   [] Goals require adjustment due to lack of progress  [] Patient is not progressing as expected and requires additional follow up with physician  [] Other    Prognosis for POC: [x] Good [] Fair  [] Poor      Patient requires continued skilled intervention: [x] Yes  [] No    Treatment/Activity Tolerance:  [x] Patient able to complete treatment  [] Patient limited by fatigue  [] Patient limited by pain    [] Patient limited by other medical complications  [] Other:     Return to Play: (if applicable)   []  Stage 1: Intro to Strength   []  Stage 2: Return to Run and Strength   []  Stage 3: Return to Jump and Strength   []  Stage 4: Dynamic Strength and Agility   []  Stage 5: Sport Specific Training     []  Ready to Return to Play, Meets All Above Stages   []  Not Ready for Return to Sports   Comments:              PLAN: See eval. Pt to be seen 2 times a week for 12 weeks. [x] Continue per plan of care [] Alter current plan (see comments above)  [] Plan of care initiated [] Hold pending MD visit [] Discharge      Electronically signed by:  Demetra Wiseman PT         Note: If patient does not return for scheduled/ recommended follow up visits, this note will serve as a discharge from care along with most recent update on progress.

## 2022-12-14 ENCOUNTER — HOSPITAL ENCOUNTER (OUTPATIENT)
Dept: PHYSICAL THERAPY | Age: 37
Setting detail: THERAPIES SERIES
Discharge: HOME | End: 2022-12-14
Payer: COMMERCIAL

## 2022-12-14 PROCEDURE — 97140 MANUAL THERAPY 1/> REGIONS: CPT

## 2022-12-14 PROCEDURE — 97110 THERAPEUTIC EXERCISES: CPT

## 2022-12-14 NOTE — FLOWSHEET NOTE
[place] [address], [address], [address]  Phone: [phone]  Fax [phone]      Date:  2022    Patient Name:  Rod Lewis    :  1985  MRN: [id number]  Restrictions/Precautions:    Medical/Treatment Diagnosis Information:  Diagnosis: I13.310B (ICD-10-CM) - Superior labrum anterior-to-posterior (SLAP) tear of right shoulder  Treatment Diagnosis: M25. 611 R shoulder stiffness, M25.411 R shoulder effusion, M62.81 R shoulder weakness, M25.511 R shoulder pain  Insurance/Certification information:  PT Insurance Information: [facility], 90 visits hard limit, no auth  Physician Information:   Dr. Erma Minor  Has the plan of care been signed (Y/N):        []  Yes  [x]  No     Date of Patient follow up with Physician:  11/10/22      Is this a Progress Report:     []  Yes  [x]  No        If Yes:  Date Range for reporting period:  Beginnin22  Ending     Progress report will be due (10 Rx or 30 days whichever is less):        Recertification will be due (POC Duration  / 90 days whichever is less): 23      Visit # Insurance Allowable Auth Required   In-person 20 90 visits (4 used already) []  Yes [x]  No    Telehealth - - []  Yes []  No    Total            Functional Scale: FOTO shoulder score: 36/100 = 36% ability, 64% disability  Date assessed:  22                                  FOTO shoulder score: 59/100 = 59% ability, 41% ability on 10/17/22      Number of Comorbidities:  [x]0     []1-2    []3+    Latex Allergy:  [x]NO      []YES  Preferred Language for Healthcare:   [x]English       []other:      Pain level: 0/10    SUBJECTIVE:   Pt says she is overall just really overtired from a very stressful week but that the shoulder has been feeling pretty good since last visit. She likes the new HEP and doesn't have any questions.     OBJECTIVE:   Observation: R fwd shoulder posture persists with tight pec major and minor  Test measurements:       ROM Left AROM Right PROM Right PROM Right  10/24/22 R  11/7/22 Right  11/9/22 Right 11/16  All post-manual R 11/28/22   Shoulder Flex 170 35 deg 100 160 deg 140 (post manual 151) 141° pre,  161° post  168° 166°   Shoulder Abd 175 N/a 70 142 deg 125 (post manual 139) 124 ° pre,   157° post 146° 173°   Shoulder ER 65 5 deg 30 70 deg 40 (post manual) 42 35°pre  75° post 43° (painful pinch) 70°   Shoulder IR T4 N/a  67 deg 67 deg          RESTRICTIONS/PRECAUTIONS: s/p R shoulder SLAP and bicep tenodesis 9/20/22    Exercises/Interventions:     Therapeutic Ex (52508) Sets/Reps/ sec Notes/CUES   Wand AAROM: ER 45 degrees Supine     Pulleys: flex, abd    Supine flexion AROM    Supine wand ER    S/L ER AROM  S/L ER with abd punch  S/L and supine UE alphabet    S/L abduction AROM     Tball on table AAROM flexion & horiz abd/add    Tband scap retract rows & B ext  Tband high row (at 60deg abd)   Cued for scap dress with retract   Wall crawls: flex, scaption    Standing wand IR hoiz behind back    Standing B ER     Prone scap retract: row,   ext,   horiz abd,   45/90 ER    Resisted ROM PNF D2 supine (~50% resistance)    Ball on plinth    Supine pec stretch over towel roll    Supine flexion  Supine SA upper cut    Arm alphabet: S/L with arm in // to floor    S/L abd with  assisted scap upward rotation     Wall pec stretch  DC painful and not feel stretch   TB ext walkout and ecc    Pt edu: ADL use of arm    Yoga ball pec stretch    Standing Y I T    Tball up wall NV    Arm Raise series to 90 deg NV    Arm Raise series one arm static at 90 deg other raising overhead    Tband D1 flex, D2 ext; quick    Tband 90/90 ER & IR    UE walk walks  UE wall walk in upper cut Yellow 2x2 laps    Ball on wall circles: flex, abd Red med ball   Ball on wall taps: W & clock Green med ball   Quick AROM flex,& abd    Overhead taps with stick    [place] vertical behind back    Bicep curls    Trcep pull down         Manual Intervention (21880) Total: 16min     PROM: flexion, abd, IR & ER @ 45 deg and @ 90 deg    Jt Mobs: posterior & : grad III    Pec minor stretch, manual 2x 30 sec    STM to subscap and lat (with movement)    Supine pec stretch over side of table with trigger point to pec minor 5 min    MWM S/L abd with MFR to lat and subscap    MWM S/L horiz add/abd with MFR to pec    Dry needling assessment and tx    Cont relax SL scap dep/add Arm in abd   Abd stretch with pressure on bicep tendon 1 min    STM/trigger point: upper trap, mid trap, rhomboids, scalene subscap, lat dorsi  10 min    Seated MWM shoulder flexion, GH lateral glide & scap upward rot    Shoulder abd with [address] post glide        NMR re-education (27030) CUES NEEDED   Lat pull down stool sit Cues to prep with scap then arms, reverse on release   Supine 90° shoulder flexion  perturbations    Bird dog    Quadruped SA shoulder taps     Tactile cue for SA   Low trap setting on weight machine Wall one for home   Bosu plank hold  Bosu with feet walks  Bosu with hip flexion    BOSU push-up plus 2x10    Body Dell: flex vert, abd horiz 2x30\" ea                            Therapeutic Activity (27859)                    IFC    Game Ready      Consent signed 11/21/2022 and precautions addressed. See media tab.    Needle Size Technique Notes IES   Site 1 .[id number] [] Pistoning / []  Threading  [x]  Winding/Coning  []    Site 2 .3x40mm [] Pistoning / [x]  Threading  [x]  Winding/Coning []    Site 3 .10a13ik [] Pistoning / [x]  Threading  []  Winding/Coning Twitch response, major release []    Site 4 .[id number] [] Pistoning / [x]  Threading  [x]  Winding/Coning Pulling skin away and using needle in horizontal to torso direction []    Site 5 .[id number] [] Pistoning / [x]  Threading  [x]  Winding/Coning Pulling skin away and using needle in horizontal to torso direction []    Site 6   [] Pistoning / []  Threading  []  Winding/Coning  []    Site 7   [] Pistoning / []  Threading  []  Winding/Coning  []    Site 8   [] Pistoning / []  Threading  []  Winding/Coning  []    Site 9   [] Pistoning / []  Threading  []  Winding/Coning  []    Site 10   [] Pistoning / []  Threading  []  Winding/Coning  []      **The above techniques were used to restore functional range of motion, reduce muscle spasm, and induce healing in the corresponding musculature by means of intramuscular mobilization. Clean Technique was utilized today while applying the Dry needling treatment. The treatment sites where cleaned with 70% solution of isopropyl alcohol. The PT washed their hands and utilized treatment gloves along with hand  prior to inserting the needles. All needles where removed and discarded in the appropriate sharps container. MD has given verbal and/or written approval for this treatment. **          ** Educated patient on anatomy, trigger point etiology, expectations for TDN (bruising, soreness, etc), outcomes, and recommendations for exercise. **     Therapeutic Exercise and NMR EXR  [x] (54093) Provided verbal/tactile cueing for activities related to strengthening, flexibility, endurance, ROM  for improvements in scapular, scapulothoracic and UE control with self care, reaching, carrying, lifting, house/yardwork, driving/computer work.    [] (38764) Provided verbal/tactile cueing for activities related to improving balance, coordination, kinesthetic sense, posture, motor skill, proprioception  to assist with  scapular, scapulothoracic and UE control with self care, reaching, carrying, lifting, house/yardwork, driving/computer work. Therapeutic Activities:    [] (48865 or 66791) Provided verbal/tactile cueing for activities related to improving balance, coordination, kinesthetic sense, posture, motor skill, proprioception and motor activation to allow for proper function of scapular, scapulothoracic and UE control with self care, carrying, lifting, driving/computer work. Home Exercise Program:    [] (49401) Reviewed/Progressed HEP activities related to strengthening, flexibility, endurance, ROM of scapular, scapulothoracic and UE control with self care, reaching, carrying, lifting, house/yardwork, driving/computer work  [] (99720) Reviewed/Progressed HEP activities related to improving balance, coordination, kinesthetic sense, posture, motor skill, proprioception of scapular, scapulothoracic and UE control with self care, reaching, carrying, lifting, house/yardwork, driving/computer work      Access Code: [email or web address]  URL: [employer]/  Date: 12/12/2022  Prepared by: Jimenez Range    Exercises  Y,I,T - 1 x daily - 7 x weekly - 2-3 sets - 10 reps  Horizontal Wall Walk with Resistance - 1 x daily - 7 x weekly - 2-3 sets - 15-20 reps  Standing Single Arm Shoulder PNF D1 Flexion with Anchored Resistance - 1 x daily - 7 x weekly - 2-3 sets - 10 reps  Standing Shoulder Single Arm PNF D2 Flexion with Anchored Resistance - 1 x daily - 7 x weekly - 2-3 sets - 10 reps  Standing Single Arm Shoulder External Rotation in Abduction with Anchored Resistance - 1 x daily - 7 x weekly - 2-3 sets - 10 reps  Standing Single Arm Shoulder Internal Rotation in Abduction with Anchored Resistance - 1 x daily - 7 x weekly - 2-3 sets - 10 reps  Standing Bicep Curls Supinated with Dumbbells - 1 x daily - 7 x weekly - 2-3 sets - 10 reps  Standing Tricep Extensions with Resistance - 1 x daily - 7 x weekly - 2-3 sets - 10 reps      Manual Treatments:  PROM / STM / Oscillations-Mobs:  G-I, II, III, IV (PA's, Inf., Post.)  [x] (85773) Provided manual therapy to mobilize soft tissue/joints of cervical/CT, scapular GHJ and UE for the purpose of modulating pain, promoting relaxation,  increasing ROM, reducing/eliminating soft tissue swelling/inflammation/restriction, improving soft tissue extensibility and allowing for proper ROM for normal function with self care, reaching, carrying, lifting, house/yardwork, driving/computer work    Modalities:   Trigger point Dry Needling:  fine needle insertion into myofascial trigger points to stimulate a healing response  [] (41651) Needle insertion without injection: 1 or 2 muscles  [] (34378) Needle insertion without injection: 3 or more muscles    Charges  Timed Code Treatment Minutes: 45   Total Treatment Minutes: 45     [] EVAL (LOW) 66704   [] EVAL (MOD) 96743   [] EVAL (HIGH) 19879   [] RE-EVAL     [x] GP(94518) x 2  [] IONTO  [] NMR (52826) x   [x] VASO  [x] Manual (47234) x  1 [] Other:  [] TA x      [] Mech Traction (33522)  [] ES(attended) (19246)      [] ES (un) (46801):   [] TDN needle insertion    Modalities:        [] GR/ESU 15 min    [] GR 15 min  [] ESU     [] CP    [] MHP    [] declined     GOALS:  Patient stated goal: playing overhead volleyball  [] Progressing: [] Met: [x] Not Met: [] Adjusted    Therapist goals for Patient:   Short Term Goals: To be achieved in: 2 weeks  1. Independent in HEP and progression per patient tolerance, in order to prevent re-injury. [] Progressing: [x] Met: [] Not Met: [] Adjusted  2. Patient will have a decrease in pain to facilitate improvement in movement, function, and ADLs as indicated by Functional Deficits. [] Progressing: [x] Met: [] Not Met: [] Adjusted    Long Term Goals: To be achieved in: 12 weeks  1. Disability index score of 10% or better for the FOTO shoulder score to assist with reaching prior level of function. [] Progressing: [] Met: [] Not Met: [] Adjusted  2. Patient will demonstrate increased AROM to WNL and symmetrical to uninvolved limb to allow for proper joint functioning as indicated by patients Functional Deficits. [x] Progressing: [] Met: [] Not Met: [] Adjusted  3. Patient will demonstrate an increase in Strength to 4+/5 to allow for proper functional mobility as indicated by patients Functional Deficits. [] Progressing: [] Met: [x] Not Met: [] Adjusted  4.  Patient will be able to lift a 5lbs object overhead with normal scapulohumeral rhythm without increased symptoms or restriction. [] Progressing: [] Met: [x] Not Met: [] Adjusted  5. Pt will be able to perform UE plyometric activity in [place] or [facility] without increased symptoms or restriction to assist her in returning to an active lifestyle and PLOF. [] Progressing: [] Met: [x] Not Met: [] Adjusted     Progression Towards Functional goals:  [x] Patient is progressing as expected towards functional goals listed. [] Progression is slowed due to complexities listed. [] Progression has been slowed due to co-morbidities. [] Plan just implemented, too soon to assess goals progression  [] Other:     ASSESSMENT:  Pt wasn't as tight or myofascially restricted in the shoulder girdle today. She was able to continue with strength and proprioception challenge without increased pain only muscle fatigue. Overall Progression Towards Functional goals/ Treatment Progress Update:  [x] Patient is progressing as expected towards functional goals listed. [] Progression is slowed due to complexities/Impairments listed. [] Progression has been slowed due to co-morbidities.   [] Plan just implemented, too soon to assess goals progression <30days   [] Goals require adjustment due to lack of progress  [] Patient is not progressing as expected and requires additional follow up with physician  [] Other    Prognosis for POC: [x] Good [] Fair  [] Poor      Patient requires continued skilled intervention: [x] Yes  [] No    Treatment/Activity Tolerance:  [x] Patient able to complete treatment  [] Patient limited by fatigue  [] Patient limited by pain    [] Patient limited by other medical complications  [] Other:     Return to Play: (if applicable)   []  Stage 1: Intro to Strength   []  Stage 2: Return to Run and Strength   []  Stage 3: Return to Jump and Strength   []  Stage 4: Dynamic Strength and Agility   []  Stage 5: Sport Specific Training     []  Ready to Return to Play, Meets All Above Stages   []  Not Ready for Return to Sports   Comments:              PLAN: See eval. Pt to be seen 2 times a week for 12 weeks. [x] Continue per plan of care [] Alter current plan (see comments above)  [] Plan of care initiated [] Hold pending MD visit [] Discharge      Electronically signed by:  Nba Saldivar PT         Note: If patient does not return for scheduled/ recommended follow up visits, this note will serve as a discharge from care along with most recent update on progress.

## 2022-12-16 ENCOUNTER — APPOINTMENT (OUTPATIENT)
Dept: PHYSICAL THERAPY | Age: 37
End: 2022-12-16
Payer: COMMERCIAL

## 2022-12-19 ENCOUNTER — HOSPITAL ENCOUNTER (OUTPATIENT)
Dept: PHYSICAL THERAPY | Age: 37
Setting detail: THERAPIES SERIES
Discharge: HOME | End: 2022-12-19
Payer: COMMERCIAL

## 2022-12-19 PROCEDURE — 97016 VASOPNEUMATIC DEVICE THERAPY: CPT

## 2022-12-19 PROCEDURE — 97140 MANUAL THERAPY 1/> REGIONS: CPT

## 2022-12-19 PROCEDURE — 97110 THERAPEUTIC EXERCISES: CPT

## 2022-12-19 NOTE — FLOWSHEET NOTE
The [address] [place] [address],[address]  Phone: [phone]  Fax [phone]      Date:  2022    Patient Name:  Cristy Watts    :  1985  MRN: [id number]  Restrictions/Precautions:    Medical/Treatment Diagnosis Information:  Diagnosis: M30.590D (ICD-10-CM) - Superior labrum anterior-to-posterior (SLAP) tear of right shoulder  Treatment Diagnosis: M25. 611 R shoulder stiffness, M25.411 R shoulder effusion, M62.81 R shoulder weakness, M25.511 R shoulder pain  Insurance/Certification information:  PT Insurance Information: [facility], 90 visits hard limit, no auth  Physician Information:   Dr. Branden Kiser  Has the plan of care been signed (Y/N):        []  Yes  [x]  No     Date of Patient follow up with Physician:  11/10/22      Is this a Progress Report:     []  Yes  [x]  No        If Yes:  Date Range for reporting period:  Beginnin22  Ending     Progress report will be due (10 Rx or 30 days whichever is less):        Recertification will be due (POC Duration  / 90 days whichever is less): 23      Visit # Insurance Allowable Auth Required   In-person 21 90 visits (4 used already) []  Yes [x]  No    Telehealth - - []  Yes []  No    Total            Functional Scale: FOTO shoulder score: 36/100 = 36% ability, 64% disability  Date assessed:  22                                  FOTO shoulder score: 59/100 = 59% ability, 41% ability on 10/17/22      Number of Comorbidities:  [x]0     []1-2    []3+    Latex Allergy:  [x]NO      []YES  Preferred Language for Healthcare:   [x]English       []other:      Pain level: 0/10    SUBJECTIVE:   I still feel stiff. Over the past few weeks I feel like it has gotten a little better range of motion wise.     OBJECTIVE:   Observation: R fwd shoulder posture persists with tight pec major and minor  Test measurements:       ROM Left AROM Right PROM Right PROM Right  10/24/22 R  11/7/22 Right  11/9/22 Right 11/16  All post-manual R 11/28/22   Shoulder Flex 170 35 deg 100 160 deg 140 (post manual 151) 141° pre,  161° post  168° 166°   Shoulder Abd 175 N/a 70 142 deg 125 (post manual 139) 124 ° pre,   157° post 146° 173°   Shoulder ER 65 5 deg 30 70 deg 40 (post manual) 42 35°pre  75° post 43° (painful pinch) 70°   Shoulder IR T4 N/a  67 deg 67 deg          RESTRICTIONS/PRECAUTIONS: s/p R shoulder SLAP and bicep tenodesis 9/20/22    Exercises/Interventions:     Therapeutic Ex (48549) Sets/Reps/ sec Notes/CUES   Wand AAROM: ER 45 degrees Supine     Pulleys: flex, abd    Supine flexion AROM    Supine wand ER    S/L ER AROM  S/L ER with abd punch  S/L and supine UE alphabet    S/L abduction AROM     Tball on table AAROM flexion & horiz abd/add    Tband scap retract rows & B ext  Tband high row (at 60deg abd)   Cued for scap dress with retract   Wall crawls: flex, scaption    Standing wand IR hoiz behind back    Standing B ER     Prone scap retract: row,   ext,   horiz abd,   45/90 ER    Resisted ROM PNF D2 supine (~50% resistance)    Ball on plinth    Supine pec stretch over towel roll    Supine flexion  Supine SA upper cut    Arm alphabet: S/L with arm in // to floor    S/L abd with  assisted scap upward rotation     Wall pec stretch  DC painful and not feel stretch   TB ext walkout and ecc    Pt edu: ADL use of arm    Yoga ball pec stretch    Standing Y I T    Standing T Ls out and front 10x    Tball up wall    Arm Raise series to 90 deg    Arm Raise series one arm static at 90 deg other raising overhead    Tband D1 flex, D2 ext; quick    Tband 90/90 ER & IR    UE walk walks  UE wall walk in upper cut OVL 2x2 laps  Yellow 2x2 laps    Ball on wall circles: flex, abd 2x10cw/ccw Red med ball   Ball on wall taps: W & clock Green med ball   Quick AROM flex,& abd    Overhead taps with stick    Baton pass vertical behind back 2x10    Bicep curls    Tricep pull down    [employer] behind back         Manual Intervention ([phone])    PROM: flexion, abd, IR & ER @ 45 deg and @ 90 deg    Jt Mobs: posterior & : grad III    Pec minor stretch, manual    STM to subscap and lat  8 min    Supine pec stretch over side of table with trigger point to pec minor    MWM S/L abd with MFR to lat and subscap    MWM S/L horiz add/abd with MFR to pec    Dry needling assessment and tx    Cont relax SL scap dep/add Arm in abd   Abd stretch with pressure on bicep tendon    STM/trigger point: upper trap, mid trap, rhomboids, scalene subscap, lat dorsi     Seated MWM shoulder flexion, GH lateral glide & scap upward rot    Shoulder abd with GH post glide 5 min        NMR re-education (66206) CUES NEEDED   Lat pull down stool sit Cues to prep with scap then arms, reverse on release   Supine 90° shoulder flexion  perturbations    Bird dog    Quadruped SA shoulder taps     Tactile cue for SA   Low trap setting on weight machine Wall one for home   Bosu plank hold  Bosu with feet walks  Bosu with hip flexion    BOSU push-up plus    Body Dell: flex vert, abd horiz                            Therapeutic Activity (65209)                    IFC    Game Ready 15 min      Consent signed 11/21/2022 and precautions addressed. See media tab.    Needle Size Technique Notes IES   Site 1 .[id number] [] Pistoning / []  Threading  [x]  Winding/Coning  []    Site 2 .3x40mm [] Pistoning / [x]  Threading  [x]  Winding/Coning []    Site 3 .21n16mk [] Pistoning / [x]  Threading  []  Winding/Coning Twitch response, major release []    Site 4 .55h34wz [] Pistoning / [x]  Threading  [x]  Winding/Coning Pulling skin away and using needle in horizontal to torso direction []    Site 5 .69g28vz [] Pistoning / [x]  Threading  [x]  Winding/Coning Pulling skin away and using needle in horizontal to torso direction []    Site 6   [] Pistoning / []  Threading  []  Winding/Coning  []    Site 7   [] Pistoning / []  Threading  []  Winding/Coning  []    Site 8 [] Pistoning / []  Threading  []  Winding/Coning  []    Site 9   [] Pistoning / []  Threading  []  Winding/Coning  []    Site 10   [] Pistoning / []  Threading  []  Winding/Coning  []      **The above techniques were used to restore functional range of motion, reduce muscle spasm, and induce healing in the corresponding musculature by means of intramuscular mobilization. Clean Technique was utilized today while applying the Dry needling treatment. The treatment sites where cleaned with 70% solution of isopropyl alcohol. The PT washed their hands and utilized treatment gloves along with hand  prior to inserting the needles. All needles where removed and discarded in the appropriate sharps container. MD has given verbal and/or written approval for this treatment. **          ** Educated patient on anatomy, trigger point etiology, expectations for TDN (bruising, soreness, etc), outcomes, and recommendations for exercise. **     Therapeutic Exercise and NMR EXR  [x] (22506) Provided verbal/tactile cueing for activities related to strengthening, flexibility, endurance, ROM  for improvements in scapular, scapulothoracic and UE control with self care, reaching, carrying, lifting, house/yardwork, driving/computer work.    [] (82551) Provided verbal/tactile cueing for activities related to improving balance, coordination, kinesthetic sense, posture, motor skill, proprioception  to assist with  scapular, scapulothoracic and UE control with self care, reaching, carrying, lifting, house/yardwork, driving/computer work. Therapeutic Activities:    [] (32509 or 66155) Provided verbal/tactile cueing for activities related to improving balance, coordination, kinesthetic sense, posture, motor skill, proprioception and motor activation to allow for proper function of scapular, scapulothoracic and UE control with self care, carrying, lifting, driving/computer work.      Home Exercise Program:    [] (46987) Reviewed/Progressed HEP activities related to strengthening, flexibility, endurance, ROM of scapular, scapulothoracic and UE control with self care, reaching, carrying, lifting, house/yardwork, driving/computer work  [] (27339) Reviewed/Progressed HEP activities related to improving balance, coordination, kinesthetic sense, posture, motor skill, proprioception of scapular, scapulothoracic and UE control with self care, reaching, carrying, lifting, house/yardwork, driving/computer work      Access Code: [email or web address]  URL: [employer]  Date: 12/12/2022  Prepared by: Ledon Babinski    Exercises  Y,I,T - 1 x daily - 7 x weekly - 2-3 sets - 10 reps  Horizontal Wall Walk with Resistance - 1 x daily - 7 x weekly - 2-3 sets - 15-20 reps  Standing Single Arm Shoulder PNF D1 Flexion with Anchored Resistance - 1 x daily - 7 x weekly - 2-3 sets - 10 reps  Standing Shoulder Single Arm PNF D2 Flexion with Anchored Resistance - 1 x daily - 7 x weekly - 2-3 sets - 10 reps  Standing Single Arm Shoulder External Rotation in Abduction with Anchored Resistance - 1 x daily - 7 x weekly - 2-3 sets - 10 reps  Standing Single Arm Shoulder Internal Rotation in Abduction with Anchored Resistance - 1 x daily - 7 x weekly - 2-3 sets - 10 reps  Standing Bicep Curls Supinated with Dumbbells - 1 x daily - 7 x weekly - 2-3 sets - 10 reps  Standing Tricep Extensions with Resistance - 1 x daily - 7 x weekly - 2-3 sets - 10 reps      Manual Treatments:  PROM / STM / Oscillations-Mobs:  G-I, II, III, IV (PA's, Inf., Post.)  [x] (57851) Provided manual therapy to mobilize soft tissue/joints of cervical/CT, scapular GHJ and UE for the purpose of modulating pain, promoting relaxation,  increasing ROM, reducing/eliminating soft tissue swelling/inflammation/restriction, improving soft tissue extensibility and allowing for proper ROM for normal function with self care, reaching, carrying, lifting, house/yardwork, driving/computer work    Modalities:   Trigger point Dry Needling:  fine needle insertion into myofascial trigger points to stimulate a healing response  [] (50346) Needle insertion without injection: 1 or 2 muscles  [] (97220) Needle insertion without injection: 3 or more muscles    Charges  Timed Code Treatment Minutes: 45   Total Treatment Minutes: 60     [] EVAL (LOW) 03986   [] EVAL (MOD) 32477   [] EVAL (HIGH) 31106   [] RE-EVAL     [x] YG(24980) x 2  [] IONTO  [] NMR (19015) x   [x] VASO  [x] Manual (03199) x  1 [] Other:  [] TA x      [] Mech Traction (39800)  [] ES(attended) (87502)      [] ES (un) (57715):   [] TDN needle insertion    Modalities:        [] GR/ESU 15 min    [] GR 15 min  [] ESU     [] CP    [] MHP    [] declined     GOALS:  Patient stated goal: playing overhead volleyball  [] Progressing: [] Met: [x] Not Met: [] Adjusted    Therapist goals for Patient:   Short Term Goals: To be achieved in: 2 weeks  1. Independent in HEP and progression per patient tolerance, in order to prevent re-injury. [] Progressing: [x] Met: [] Not Met: [] Adjusted  2. Patient will have a decrease in pain to facilitate improvement in movement, function, and ADLs as indicated by Functional Deficits. [] Progressing: [x] Met: [] Not Met: [] Adjusted    Long Term Goals: To be achieved in: 12 weeks  1. Disability index score of 10% or better for the FOTO shoulder score to assist with reaching prior level of function. [] Progressing: [] Met: [] Not Met: [] Adjusted  2. Patient will demonstrate increased AROM to WNL and symmetrical to uninvolved limb to allow for proper joint functioning as indicated by patients Functional Deficits. [x] Progressing: [] Met: [] Not Met: [] Adjusted  3. Patient will demonstrate an increase in Strength to 4+/5 to allow for proper functional mobility as indicated by patients Functional Deficits. [] Progressing: [] Met: [x] Not Met: [] Adjusted  4.  Patient will be able to lift a 5lbs object overhead with normal scapulohumeral rhythm without increased symptoms or restriction. [] Progressing: [] Met: [x] Not Met: [] Adjusted  5. Pt will be able to perform UE plyometric activity in [place] or [facility] without increased symptoms or restriction to assist her in returning to an active lifestyle and PLOF. [] Progressing: [] Met: [x] Not Met: [] Adjusted     Progression Towards Functional goals:  [x] Patient is progressing as expected towards functional goals listed. [] Progression is slowed due to complexities listed. [] Progression has been slowed due to co-morbidities. [] Plan just implemented, too soon to assess goals progression  [] Other:     ASSESSMENT:  Pt expressed frustration with overhead motion and wants to gain it back quicker. She feels like it is getting better but feels stiff/limited in daily life. Pt scapular rhythm is improving but there is still discomfort with overhead. Overall Progression Towards Functional goals/ Treatment Progress Update:  [x] Patient is progressing as expected towards functional goals listed. [] Progression is slowed due to complexities/Impairments listed. [] Progression has been slowed due to co-morbidities.   [] Plan just implemented, too soon to assess goals progression <30days   [] Goals require adjustment due to lack of progress  [] Patient is not progressing as expected and requires additional follow up with physician  [] Other    Prognosis for POC: [x] Good [] Fair  [] Poor      Patient requires continued skilled intervention: [x] Yes  [] No    Treatment/Activity Tolerance:  [x] Patient able to complete treatment  [] Patient limited by fatigue  [] Patient limited by pain    [] Patient limited by other medical complications  [] Other:     Return to Play: (if applicable)   []  Stage 1: Intro to Strength   []  Stage 2: Return to Run and Strength   []  Stage 3: Return to Jump and Strength   []  Stage 4: Dynamic Strength and Agility   []  Stage 5: Sport Specific Training     []  Ready to Return to Play, Meets All Above Stages   []  Not Ready for Return to Sports   Comments:              PLAN: See eval. Pt to be seen 2 times a week for 12 weeks. [x] Continue per plan of care [] Alter current plan (see comments above)  [] Plan of care initiated [] Hold pending MD visit [] Discharge      Electronically signed by:  Elias Gates, PT  Demarcus Montalvo, SPT Therapist was present, directed the patient's care, made skilled judgement, and was responsible for assessment and treatment of the patient. Note: If patient does not return for scheduled/ recommended follow up visits, this note will serve as a discharge from care along with most recent update on progress.

## 2022-12-20 ENCOUNTER — OFFICE VISIT (OUTPATIENT)
Dept: ORTHOPEDIC SURGERY | Age: 37
End: 2022-12-20

## 2022-12-20 VITALS — BODY MASS INDEX: 23.7 KG/M2 | HEIGHT: 67 IN | WEIGHT: 151 LBS

## 2022-12-20 DIAGNOSIS — Z98.890 S/P ARTHROSCOPY OF RIGHT SHOULDER: Primary | ICD-10-CM

## 2022-12-20 PROCEDURE — 99024 POSTOP FOLLOW-UP VISIT: CPT | Performed by: PHYSICIAN ASSISTANT

## 2022-12-20 NOTE — PROGRESS NOTES
History of Present Illness: Orlando Tapia is a 30 y.o. female who presents for a post operative visit. The patient underwent a right arthroscopic debridement with open subpectoral biceps tenodesis on 9/20/2022. She is doing her physical therapy at the [facility]. Patient reports that she feels that her movement is improving and the shoulder does not feel as tight. He denies any new injuries or setbacks since her last visit the patient deny fevers, chills, numbness, tingling, and shortness of breath. Medical History:  Patient's medications, allergies, past medical, surgical, social and family histories were reviewed and updated as appropriate. No notes on file    Review of Systems  A 14 point review of systems was completed by the patient is available in the media section of the scanned medical record and was reviewed on 12/20/2022. Vital Signs: There were no vitals filed for this visit. General/Appearance: Alert and oriented and in no apparent distress. Skin:  There are no skin lesions, cellulitis, or extreme edema. The patient has warm and well-perfused Bilateral upper extremities with brisk capillary refill.      right Shoulder Exam:    Inspection: right shoulder incision that is clean, dry and intact and well approximated. The Prineo dressing is still in place. Mild ecchymosis and swelling are present as can be expected. There is no erythema, drainage or other signs of infection    Palpation:  No crepitus to gentle motion    Active Range of Motion: Forward elevation 150 degrees, abduction 150 degrees, external rotation of 35 degrees and internal rotation to T10     Passive Range of Motion:  deferred    Strength: External rotation with resistance is 4/5, internal rotation with resistance +4/5, 4/5 champagne toast test    Special Tests: No Cade muscle deformity.     Neurovascular: Sensation to light touch is intact, no motor deficits, palpable radial pulses 2+    Radiology: Plain radiographs not obtained today. Assessment :  Ms. Glenys Watst is a 30 y.o. patient underwent a right arthroscopic debridement with open subpectoral biceps tenodesis on 9/20/2022. Impression:  Encounter Diagnosis   Name Primary? S/P arthroscopy of right shoulder Yes       Office Procedures:  No orders of the defined types were placed in this encounter. Treatment Plan:    Overall the patient is doing well. The pain is well-controlled. We will have her work on endrange movements as well as the strength program for her recovery. Therapy orders were updated today. We will see her back in 6 weeks for her follow-up visit. 3:05 PM      Krystne Birmingham PA-C  Orthopaedic Sports Medicine Physician Assistant      This dictation was performed with a verbal recognition program [facility]) and it was checked for errors. It is possible that there are still dictated errors within this office note. If so, please bring any errors to my attention for an addendum. All efforts were made to ensure that this office note is accurate.

## 2022-12-21 ENCOUNTER — HOSPITAL ENCOUNTER (OUTPATIENT)
Dept: PHYSICAL THERAPY | Age: 37
Setting detail: THERAPIES SERIES
Discharge: HOME | End: 2022-12-21
Payer: COMMERCIAL

## 2022-12-21 PROCEDURE — 97140 MANUAL THERAPY 1/> REGIONS: CPT | Performed by: PHYSICAL THERAPIST

## 2022-12-21 PROCEDURE — 97016 VASOPNEUMATIC DEVICE THERAPY: CPT | Performed by: PHYSICAL THERAPIST

## 2022-12-21 PROCEDURE — 97110 THERAPEUTIC EXERCISES: CPT | Performed by: PHYSICAL THERAPIST

## 2022-12-21 PROCEDURE — 97112 NEUROMUSCULAR REEDUCATION: CPT | Performed by: PHYSICAL THERAPIST

## 2022-12-21 NOTE — FLOWSHEET NOTE
[address]  Phone: [phone]  Fax [phone]      Date:  2022    Patient Name:  Eulalio Michelle    :  1985  MRN: [id number]  Restrictions/Precautions:    Medical/Treatment Diagnosis Information:  Diagnosis: N42.934P (ICD-10-CM) - Superior labrum anterior-to-posterior (SLAP) tear of right shoulder  Treatment Diagnosis: M25. 611 R shoulder stiffness, M25.411 R shoulder effusion, M62.81 R shoulder weakness, M25.511 R shoulder pain  Insurance/Certification information:  PT Insurance Information: [facility], 90 visits hard limit, no auth  Physician Information:   Dr. Ethan Marmolejo  Has the plan of care been signed (Y/N):        []  Yes  [x]  No     Date of Patient follow up with Physician:  11/10/22      Is this a Progress Report:     []  Yes  [x]  No        If Yes:  Date Range for reporting period:  Beginnin22  Ending     Progress report will be due (10 Rx or 30 days whichever is less):        Recertification will be due (POC Duration  / 90 days whichever is less): 23      Visit # Insurance Allowable Auth Required   In-person 22 90 visits (4 used already) []  Yes [x]  No    Telehealth - - []  Yes []  No    Total            Functional Scale: FOTO shoulder score: 36/100 = 36% ability, 64% disability Date assessed:  22                                 FOTO shoulder score: 59/100 = 59% ability, 41% ability on 10/17/22                       FOTO shoulder score: 68/100 = 68% ability, 32% ability on 22      Number of Comorbidities:  [x]0     []1-2    []3+    Latex Allergy:  [x]NO      []YES  Preferred Language for Healthcare:   [x]English       []other:      Pain level: 0/10    SUBJECTIVE:  Once I warm up in the morning (especially with the supine yoga ball roll outs) I feel less stiff.  I saw the MD yesterday and they do not think I will get full equal behind back IR but they think it will get better. I want to work on the end range strength.     OBJECTIVE:   Observation: R fwd shoulder posture persists with tight pec major and minor  Test measurements:       ROM Left AROM Right PROM Right PROM Right  10/24/22 R  11/7/22 Right  11/9/22 Right 11/16  All post-manual R 11/28/22   Shoulder Flex 170 35 deg 100 160 deg 140 (post manual 151) 141° pre,  161° post  168° 166°   Shoulder Abd 175 N/a 70 142 deg 125 (post manual 139) 124 ° pre,   157° post 146° 173°   Shoulder ER 65 5 deg 30 70 deg 40 (post manual) 42 35°pre  75° post 43° (painful pinch) 70°   Shoulder IR T4 N/a  67 deg 67 deg          RESTRICTIONS/PRECAUTIONS: s/p R shoulder SLAP and bicep tenodesis 9/20/22    Exercises/Interventions:     Therapeutic Ex (89293) Sets/Reps/ sec Notes/CUES   Wand AAROM: ER 45 degrees Supine     Pulleys: flex, abd    Supine flexion AROM    Supine wand ER    S/L ER AROM  S/L ER with abd punch  S/L and supine UE alphabet    S/L abduction AROM     Tball on table AAROM flexion & horiz abd/add    Tband scap retract rows & B ext  Tband high row (at 60deg abd)   Cued for scap dress with retract   Wall crawls: flex, scaption    Standing wand IR hoiz behind back    Standing TB ER at 45°    Prone scap retract: row,   ext,   horiz abd,   45/90 ER    Resisted ROM PNF D2 supine (~50% resistance)    Ball on plinth    Supine pec stretch over towel roll    Supine flexion  Supine SA upper cut    Arm alphabet: S/L with arm in // to floor    S/L abd with  assisted scap upward rotation     Wall pec stretch  DC painful and not feel stretch   TB ext walkout and ecc    Pt edu: ADL use of arm    Yoga ball pec stretch   Yoga ball roll outs 10 sec 10x    Standing Y I T    Standing T Ls out and front 10x    Tball up wall    Arm Raise series to 90 deg YTB abd ER abd 2x10    Arm Raise series one arm static at 90 deg other raising overhead    Tband D1 flex, D2 ext; quick    Tband 90/90 ER & IR    UE walk walks  UE wall walk in upper cut OVL 2x2 laps  Yellow 2x2 laps    Ball on wall circles: flex, abd 2x10cw/ccw Red med ball   Ball on wall taps: W & clock Green med ball   Quick AROM flex,& abd    Overhead taps with stick    Baton pass vertical behind back 2x10    Bicep curls    Tricep pull down    Towel strtch behind back     Arm behind head stretch 20 sec 4x        Manual Intervention (19793)    PROM: flexion, abd, IR & ER @ 45 deg and @ 90 deg    Jt Mobs: posterior & : grad III    Pec minor stretch, manual    STM to subscap and lat with abd movement 8 min    Supine pec stretch over side of table with trigger point to pec minor    MWM S/L abd with MFR to lat and subscap    MWM S/L horiz add/abd with MFR to pec    Dry needling assessment and tx    assisted SL scap upward rotation  Resisted SL scap upward rotation Arm in abd   Abd stretch with pressure on bicep tendon    STM/trigger point:  mid trap, rhomboids, s5 min    Seated MWM shoulder flexion, GH lateral glide & scap upward rot    Shoulder abd with GH post glide        NMR re-education (60611) CUES NEEDED   Lat pull down stool sit Cues to prep with scap then arms, reverse on release   Supine 90° shoulder flexion  perturbations    Bird dog    Quadruped SA shoulder taps     Tactile cue for SA   Low trap setting on weight machine Wall one for home   Bosu plank hold  Bosu with feet walks  Bosu with hip flexion    BOSU push-up plus    Body Dell: flex vert, abd horiz    Tball roll out lift prone on table 4x8    Horz abd 2 lbs 2x10    Wall abd snow angels 12x2 Cue to use scap and not shrug at end               Therapeutic Activity (26743)                    IFC    Game Ready 15 min      Consent signed 11/21/2022 and precautions addressed. See media tab.    Needle Size Technique Notes IES   Site 1 .[id number] [] Pistoning / []  Threading  [x]  Winding/Coning  []    Site 2 .3x40mm [] Pistoning / [x]  Threading  [x]  Winding/Coning []    Site 3 .43s55zm [] Pistoning / [x]  Threading  [] Winding/Coning Twitch response, major release []    Site 4 .[id number] [] Pistoning / [x]  Threading  [x]  Winding/Coning Pulling skin away and using needle in horizontal to torso direction []    Site 5 .[id number] [] Pistoning / [x]  Threading  [x]  Winding/Coning Pulling skin away and using needle in horizontal to torso direction []    Site 6   [] Pistoning / []  Threading  []  Winding/Coning  []    Site 7   [] Pistoning / []  Threading  []  Winding/Coning  []    Site 8   [] Pistoning / []  Threading  []  Winding/Coning  []    Site 9   [] Pistoning / []  Threading  []  Winding/Coning  []    Site 10   [] Pistoning / []  Threading  []  Winding/Coning  []      **The above techniques were used to restore functional range of motion, reduce muscle spasm, and induce healing in the corresponding musculature by means of intramuscular mobilization. Clean Technique was utilized today while applying the Dry needling treatment. The treatment sites where cleaned with 70% solution of isopropyl alcohol. The PT washed their hands and utilized treatment gloves along with hand  prior to inserting the needles. All needles where removed and discarded in the appropriate sharps container. MD has given verbal and/or written approval for this treatment. **          ** Educated patient on anatomy, trigger point etiology, expectations for TDN (bruising, soreness, etc), outcomes, and recommendations for exercise.  **     Therapeutic Exercise and NMR EXR  [x] (57166) Provided verbal/tactile cueing for activities related to strengthening, flexibility, endurance, ROM  for improvements in scapular, scapulothoracic and UE control with self care, reaching, carrying, lifting, house/yardwork, driving/computer work.    [] (82507) Provided verbal/tactile cueing for activities related to improving balance, coordination, kinesthetic sense, posture, motor skill, proprioception  to assist with  scapular, scapulothoracic and UE control with self care, reaching, carrying, lifting, house/yardwork, driving/computer work. Therapeutic Activities:    [] (01776 or 46675) Provided verbal/tactile cueing for activities related to improving balance, coordination, kinesthetic sense, posture, motor skill, proprioception and motor activation to allow for proper function of scapular, scapulothoracic and UE control with self care, carrying, lifting, driving/computer work. Home Exercise Program:    [] (42623) Reviewed/Progressed HEP activities related to strengthening, flexibility, endurance, ROM of scapular, scapulothoracic and UE control with self care, reaching, carrying, lifting, house/yardwork, driving/computer work  [] (03813) Reviewed/Progressed HEP activities related to improving balance, coordination, kinesthetic sense, posture, motor skill, proprioception of scapular, scapulothoracic and UE control with self care, reaching, carrying, lifting, house/yardwork, driving/computer work      Access Code: [email or web address]  URL: [employer]/  Date: 12/12/2022  Prepared by: Juaquin Treadwell    Exercises  Y,I,T - 1 x daily - 7 x weekly - 2-3 sets - 10 reps  Horizontal Wall Walk with Resistance - 1 x daily - 7 x weekly - 2-3 sets - 15-20 reps  Standing Single Arm Shoulder PNF D1 Flexion with Anchored Resistance - 1 x daily - 7 x weekly - 2-3 sets - 10 reps  Standing Shoulder Single Arm PNF D2 Flexion with Anchored Resistance - 1 x daily - 7 x weekly - 2-3 sets - 10 reps  Standing Single Arm Shoulder External Rotation in Abduction with Anchored Resistance - 1 x daily - 7 x weekly - 2-3 sets - 10 reps  Standing Single Arm Shoulder Internal Rotation in Abduction with Anchored Resistance - 1 x daily - 7 x weekly - 2-3 sets - 10 reps  Standing Bicep Curls Supinated with Dumbbells - 1 x daily - 7 x weekly - 2-3 sets - 10 reps  Standing Tricep Extensions with Resistance - 1 x daily - 7 x weekly - 2-3 sets - 10 reps      Manual Treatments:  PROM / STM / Oscillations-Mobs: G-I, II, III, IV (PA's, Inf., Post.)  [x] (61588) Provided manual therapy to mobilize soft tissue/joints of cervical/CT, scapular GHJ and UE for the purpose of modulating pain, promoting relaxation,  increasing ROM, reducing/eliminating soft tissue swelling/inflammation/restriction, improving soft tissue extensibility and allowing for proper ROM for normal function with self care, reaching, carrying, lifting, house/yardwork, driving/computer work    Modalities: Game Ready with IFC to address inflammation and pain x 15 min    Trigger point Dry Needling:  fine needle insertion into myofascial trigger points to stimulate a healing response  [] (14726) Needle insertion without injection: 1 or 2 muscles  [] (40635) Needle insertion without injection: 3 or more muscles    Charges  Timed Code Treatment Minutes: 53   Total Treatment Minutes: 68     [] EVAL (LOW) 65347   [] EVAL (MOD) 45332   [] EVAL (HIGH) 22039   [] RE-EVAL     [x] FW(02633) x 2  [] IONTO  [x] NMR (03041) x  1 [x] VASO  [x] Manual (27648) x  1 [] Other:  [] TA x      [] Mech Traction (29423)  [] ES(attended) (07241)      [] ES (un) (97261):   [] TDN needle insertion    Modalities:        [] GR/ESU 15 min    [] GR 15 min  [] ESU     [] CP    [] MHP    [] declined     GOALS:  Patient stated goal: playing overhead volleyball  [] Progressing: [] Met: [x] Not Met: [] Adjusted    Therapist goals for Patient:   Short Term Goals: To be achieved in: 2 weeks  1. Independent in HEP and progression per patient tolerance, in order to prevent re-injury. [] Progressing: [x] Met: [] Not Met: [] Adjusted  2. Patient will have a decrease in pain to facilitate improvement in movement, function, and ADLs as indicated by Functional Deficits. [] Progressing: [x] Met: [] Not Met: [] Adjusted    Long Term Goals: To be achieved in: 12 weeks  1. Disability index score of 10% or better for the FOTO shoulder score to assist with reaching prior level of function.    [] Progressing: [] Met: [] Not Met: [] Adjusted  2. Patient will demonstrate increased AROM to WNL and symmetrical to uninvolved limb to allow for proper joint functioning as indicated by patients Functional Deficits. [x] Progressing: [] Met: [] Not Met: [] Adjusted  3. Patient will demonstrate an increase in Strength to 4+/5 to allow for proper functional mobility as indicated by patients Functional Deficits. [] Progressing: [] Met: [x] Not Met: [] Adjusted  4. Patient will be able to lift a 5lbs object overhead with normal scapulohumeral rhythm without increased symptoms or restriction. [] Progressing: [] Met: [x] Not Met: [] Adjusted  5. Pt will be able to perform UE plyometric activity in [place] or [facility] without increased symptoms or restriction to assist her in returning to an active lifestyle and PLOF. [] Progressing: [] Met: [x] Not Met: [] Adjusted     Progression Towards Functional goals:  [x] Patient is progressing as expected towards functional goals listed. [] Progression is slowed due to complexities listed. [] Progression has been slowed due to co-morbidities. [] Plan just implemented, too soon to assess goals progression  [] Other:     ASSESSMENT:  Pt overhead GH and scap motion improved today. Pt still presents with trigger points in subscap lower trap and rhomboid areas. Pt finds relief from manuals but effects are not long lasting. Overall Progression Towards Functional goals/ Treatment Progress Update:  [x] Patient is progressing as expected towards functional goals listed. [] Progression is slowed due to complexities/Impairments listed. [] Progression has been slowed due to co-morbidities.   [] Plan just implemented, too soon to assess goals progression <30days   [] Goals require adjustment due to lack of progress  [] Patient is not progressing as expected and requires additional follow up with physician  [] Other    Prognosis for POC: [x] Good [] Fair  [] Poor      Patient requires continued skilled intervention: [x] Yes  [] No    Treatment/Activity Tolerance:  [x] Patient able to complete treatment  [] Patient limited by fatigue  [] Patient limited by pain    [] Patient limited by other medical complications  [] Other:     Return to Play: (if applicable)   []  Stage 1: Intro to Strength   []  Stage 2: Return to Run and Strength   []  Stage 3: Return to Jump and Strength   []  Stage 4: Dynamic Strength and Agility   []  Stage 5: Sport Specific Training     []  Ready to Return to Play, Meets All Above Stages   []  Not Ready for Return to Sports   Comments:              PLAN: See eval. Pt to be seen 2 times a week for 12 weeks. [x] Continue per plan of care [] Alter current plan (see comments above)  [] Plan of care initiated [] Hold pending MD visit [] Discharge      Electronically signed by:  Charley Copeland, PT  Gil Roberts, EKATERINA Therapist was present, directed the patient's care, made skilled judgement, and was responsible for assessment and treatment of the patient. Note: If patient does not return for scheduled/ recommended follow up visits, this note will serve as a discharge from care along with most recent update on progress.

## 2022-12-23 ENCOUNTER — APPOINTMENT (OUTPATIENT)
Dept: PHYSICAL THERAPY | Age: 37
End: 2022-12-23
Payer: COMMERCIAL

## 2022-12-28 ENCOUNTER — HOSPITAL ENCOUNTER (OUTPATIENT)
Dept: PHYSICAL THERAPY | Age: 37
Setting detail: THERAPIES SERIES
Discharge: HOME | End: 2022-12-28
Payer: COMMERCIAL

## 2022-12-28 PROCEDURE — 20560 NDL INSJ W/O NJX 1 OR 2 MUSC: CPT

## 2022-12-28 PROCEDURE — 97110 THERAPEUTIC EXERCISES: CPT

## 2022-12-28 PROCEDURE — 97016 VASOPNEUMATIC DEVICE THERAPY: CPT

## 2022-12-28 NOTE — FLOWSHEET NOTE
is not all the way back yet.      OBJECTIVE:   Observation: R fwd shoulder posture persists with tight pec major and minor  Test measurements:       ROM Left AROM Right PROM Right PROM Right  10/24/22 R  11/7/22 Right  11/9/22 Right 11/16  All post-manual R 11/28/22   Shoulder Flex 170 35 deg 100 160 deg 140 (post manual 151) 141° pre,  161° post  168° 166°   Shoulder Abd 175 N/a 70 142 deg 125 (post manual 139) 124 ° pre,   157° post 146° 173°   Shoulder ER 65 5 deg 30 70 deg 40 (post manual) 42 35°pre  75° post 43° (painful pinch) 70°   Shoulder IR T4 N/a  67 deg 67 deg          RESTRICTIONS/PRECAUTIONS: s/p R shoulder SLAP and bicep tenodesis 9/20/22    Exercises/Interventions:     Therapeutic Ex (30476) Sets/Reps/ sec Notes/CUES   Wand AAROM: ER 45 degrees Supine     Pulleys: flex, abd    Supine flexion AROM    Supine wand ER    S/L ER AROM  S/L ER with abd punch  S/L and supine UE alphabet    S/L abduction AROM     Tball on table AAROM flexion & horiz abd/add    Tband scap retract rows & B ext  Tband high row (at 60deg abd)   Cued for scap dress with retract   Wall crawls: flex, scaption    Standing wand IR hoiz behind back    Standing TB ER at 45°    Prone scap retract: row,   ext,   horiz abd,   45/90 ER    Resisted ROM PNF D2 supine (~50% resistance)    Ball on plinth    Supine pec stretch over towel roll    Supine flexion  Supine SA upper cut    Arm alphabet: S/L with arm in // to floor    S/L abd with  assisted scap upward rotation     Wall pec stretch  DC painful and not feel stretch   TB ext walkout and ecc    Pt edu: ADL use of arm    Yoga ball pec stretch   Yoga ball roll outs    Standing Y I T    Standing T Ls out and front    Tball up wall    Arm Raise series to 90 deg     Arm Raise series one arm static at 90 deg other raising overhead    Tband D1 flex, D2 ext; quick    Tband 90/90 ER & IR    Upper cut wall slides Red 2x10    UE walk walks  UE wall walk in upper cut Red 2x1 laps    Ball on wall circles: flex, abd Red med ball   Ball on wall taps: W & clock Green med ball   Quick AROM flex,& abd    Overhead taps with stick    [place] vertical behind back 2x10    Bicep curls 7# 2x15    Tricep pull down 30 lbs 2x10    Low trap setting overhead Double green 2x15    B GH ER Green 2x15    Arm behind head stretch X30\"        Manual Intervention (12927) Total: 10min     PROM: flexion, abd, IR & ER @ 45 deg and @ 90 deg 3 min    Jt Mobs:  inferior: grad III 1 min    Pec minor stretch, manual    STM to subscap and lat with abd movement 8 min    Supine pec stretch over side of table with trigger point to pec minor    MWM S/L abd with MFR to lat and subscap    MWM S/L horiz add/abd with MFR to pec    Dry needling assessment and tx 6 min    assisted SL scap upward rotation  Resisted SL scap upward rotation Arm in abd   Abd stretch with pressure on bicep tendon    STM/trigger point:  mid trap, rhomboids, s   Seated MWM shoulder flexion, GH lateral glide & scap upward rot    Shoulder abd with GH post glide        NMR re-education (06987) CUES NEEDED   Lat pull down stool sit Cues to prep with scap then arms, reverse on release   Supine 90° shoulder flexion  perturbations    Bird dog    Quadruped SA shoulder taps     Tactile cue for SA   Low trap setting on weight machine Wall one for home   Bosu plank hold  Bosu with feet walks  Bosu with hip flexion    BOSU push-up plus    Body Dell: flex vert, abd horiz    Tball roll out lift prone on table    Horz abd    Wall abd snow angels Cue to use scap and not shrug at end               Therapeutic Activity (47031)                    IFC    Game Ready 15 min      Consent signed 11/21/2022 and precautions addressed. See media tab.    Needle Size Technique Notes IES   Site 1 .[id number] [] Pistoning / []  Threading  [x]  Winding/Coning  []    Site 2 .3x40mm [] Pistoning / [x]  Threading  [x]  Winding/Coning []    Site 3 Right upper trap x2 .[id number] [] Pistoning / [x] Threading  []  Winding/Coning Twitch response, major release []    Site 4 Mid-trap x1 .[id number] [] Pistoning / [x]  Threading  [x]  Winding/Coning Pulling skin away and using needle in horizontal to torso direction []    Site 5 .[id number] [] Pistoning / [x]  Threading  [x]  Winding/Coning Pulling skin away and using needle in horizontal to torso direction []    Site 6   [] Pistoning / []  Threading  []  Winding/Coning  []    Site 7   [] Pistoning / []  Threading  []  Winding/Coning  []    Site 8   [] Pistoning / []  Threading  []  Winding/Coning  []    Site 9   [] Pistoning / []  Threading  []  Winding/Coning  []    Site 10   [] Pistoning / []  Threading  []  Winding/Coning  []      **The above techniques were used to restore functional range of motion, reduce muscle spasm, and induce healing in the corresponding musculature by means of intramuscular mobilization. Clean Technique was utilized today while applying the Dry needling treatment. The treatment sites where cleaned with 70% solution of isopropyl alcohol. The PT washed their hands and utilized treatment gloves along with hand  prior to inserting the needles. All needles where removed and discarded in the appropriate sharps container. MD has given verbal and/or written approval for this treatment. **          ** Educated patient on anatomy, trigger point etiology, expectations for TDN (bruising, soreness, etc), outcomes, and recommendations for exercise.  **     Therapeutic Exercise and NMR EXR  [x] (26301) Provided verbal/tactile cueing for activities related to strengthening, flexibility, endurance, ROM  for improvements in scapular, scapulothoracic and UE control with self care, reaching, carrying, lifting, house/yardwork, driving/computer work.    [] (48797) Provided verbal/tactile cueing for activities related to improving balance, coordination, kinesthetic sense, posture, motor skill, proprioception  to assist with  scapular, scapulothoracic and UE control with self care, reaching, carrying, lifting, house/yardwork, driving/computer work. Therapeutic Activities:    [] (58475 or 14209) Provided verbal/tactile cueing for activities related to improving balance, coordination, kinesthetic sense, posture, motor skill, proprioception and motor activation to allow for proper function of scapular, scapulothoracic and UE control with self care, carrying, lifting, driving/computer work. Home Exercise Program:    [x] (00217) Reviewed/Progressed HEP activities related to strengthening, flexibility, endurance, ROM of scapular, scapulothoracic and UE control with self care, reaching, carrying, lifting, house/yardwork, driving/computer work  [] (45760) Reviewed/Progressed HEP activities related to improving balance, coordination, kinesthetic sense, posture, motor skill, proprioception of scapular, scapulothoracic and UE control with self care, reaching, carrying, lifting, house/yardwork, driving/computer work      Access Code: [email or web address]  URL: [email or web address]  Date: 12/28/2022  Prepared by: Tierra Villarreal    Exercises  Standing Shoulder Internal Rotation Stretch with Dowel - 1 x daily - 7 x weekly - 3 sets - 10 reps  Serratus Activation at Wall - 1 x daily - 7 x weekly - 3 sets - 10 reps  Forearm Walks on Wall with Resistance Band - 1 x daily - 7 x weekly - 3 sets - 10 reps  Standing Tricep Extensions with Resistance - 1 x daily - 7 x weekly - 3 sets - 15 reps  Standing Bicep Curls Supinated with Dumbbells - 1 x daily - 7 x weekly - 3 sets - 15 reps  Standing Low Trap Setting with Resistance at Wall - 1 x daily - 7 x weekly - 3 sets - 10-15 reps  Shoulder External Rotation and Scapular Retraction with Resistance - 1 x daily - 7 x weekly - 3 sets - 15 reps  Supine Chest Stretch with Elbows Bent - 1 x daily - 7 x weekly - 3 sets - 1 reps - 1 min hold    Manual Treatments:  PROM / STM / Oscillations-Mobs:  G-I, II, III, IV (PA's, Inf., Post.)  [x] (31119) Provided manual therapy to mobilize soft tissue/joints of cervical/CT, scapular GHJ and UE for the purpose of modulating pain, promoting relaxation,  increasing ROM, reducing/eliminating soft tissue swelling/inflammation/restriction, improving soft tissue extensibility and allowing for proper ROM for normal function with self care, reaching, carrying, lifting, house/yardwork, driving/computer work    Modalities: Game Ready to address pain and remaining inflammation    Trigger point Dry Needling:  fine needle insertion into myofascial trigger points to stimulate a healing response  [x] (83281) Needle insertion without injection: 1 or 2 muscles  [] (86582) Needle insertion without injection: 3 or more muscles    Charges  Timed Code Treatment Minutes: 50   Total Treatment Minutes: 65     [] EVAL (LOW) 20306   [] EVAL (MOD) 36502   [] EVAL (HIGH) 43511   [] RE-EVAL     [x] UY(77948) x 2  [] IONTO  [] NMR (01456) x   [x] VASO  [] Manual (89701) x   [] Other:  [] TA x      [] Mech Traction (90550)  [] ES(attended) (14244)      [] ES (un) (44596):   [x] TDN needle insertion    Modalities:        [] GR/ESU 15 min    [] GR 15 min  [] ESU     [] CP    [] MHP    [] declined     GOALS:  Patient stated goal: playing overhead volleyball  [] Progressing: [] Met: [x] Not Met: [] Adjusted    Therapist goals for Patient:   Short Term Goals: To be achieved in: 2 weeks  1. Independent in HEP and progression per patient tolerance, in order to prevent re-injury. [] Progressing: [x] Met: [] Not Met: [] Adjusted  2. Patient will have a decrease in pain to facilitate improvement in movement, function, and ADLs as indicated by Functional Deficits. [] Progressing: [x] Met: [] Not Met: [] Adjusted    Long Term Goals: To be achieved in: 12 weeks  1. Disability index score of 10% or better for the FOTO shoulder score to assist with reaching prior level of function. [] Progressing: [] Met: [] Not Met: [] Adjusted  2.  Patient will demonstrate increased AROM to WNL and symmetrical to uninvolved limb to allow for proper joint functioning as indicated by patients Functional Deficits. [x] Progressing: [] Met: [] Not Met: [] Adjusted  3. Patient will demonstrate an increase in Strength to 4+/5 to allow for proper functional mobility as indicated by patients Functional Deficits. [] Progressing: [] Met: [x] Not Met: [] Adjusted  4. Patient will be able to lift a 5lbs object overhead with normal scapulohumeral rhythm without increased symptoms or restriction. [] Progressing: [] Met: [x] Not Met: [] Adjusted  5. Pt will be able to perform UE plyometric activity in [place] or [facility] without increased symptoms or restriction to assist her in returning to an active lifestyle and PLOF. [] Progressing: [] Met: [x] Not Met: [] Adjusted     Progression Towards Functional goals:  [x] Patient is progressing as expected towards functional goals listed. [] Progression is slowed due to complexities listed. [] Progression has been slowed due to co-morbidities. [] Plan just implemented, too soon to assess goals progression  [] Other:     ASSESSMENT:  Pt's ROM is improving and pain and impingement symptoms decreasing. Strength particularly at shoulder heihgt and above is primary remaining limitation for her. Overall Progression Towards Functional goals/ Treatment Progress Update:  [x] Patient is progressing as expected towards functional goals listed. [] Progression is slowed due to complexities/Impairments listed. [] Progression has been slowed due to co-morbidities.   [] Plan just implemented, too soon to assess goals progression <30days   [] Goals require adjustment due to lack of progress  [] Patient is not progressing as expected and requires additional follow up with physician  [] Other    Prognosis for POC: [x] Good [] Fair  [] Poor      Patient requires continued skilled intervention: [x] Yes  [] No    Treatment/Activity Tolerance:  [x] Patient able to complete treatment  [] Patient limited by fatigue  [] Patient limited by pain    [] Patient limited by other medical complications  [] Other:     Return to Play: (if applicable)   []  Stage 1: Intro to Strength   []  Stage 2: Return to Run and Strength   []  Stage 3: Return to Jump and Strength   []  Stage 4: Dynamic Strength and Agility   []  Stage 5: Sport Specific Training     []  Ready to Return to Play, Meets All Above Stages   []  Not Ready for Return to Sports   Comments:              PLAN: See eval. Pt to be seen 2 times a week for 12 weeks. [x] Continue per plan of care [] Alter current plan (see comments above)  [] Plan of care initiated [] Hold pending MD visit [] Discharge      Electronically signed by:  Mare Miranda PT     Note: If patient does not return for scheduled/ recommended follow up visits, this note will serve as a discharge from care along with most recent update on progress.

## 2022-12-30 ENCOUNTER — HOSPITAL ENCOUNTER (OUTPATIENT)
Dept: PHYSICAL THERAPY | Age: 37
Setting detail: THERAPIES SERIES
Discharge: HOME | End: 2022-12-30
Payer: COMMERCIAL

## 2022-12-30 PROCEDURE — 97112 NEUROMUSCULAR REEDUCATION: CPT

## 2022-12-30 PROCEDURE — 97110 THERAPEUTIC EXERCISES: CPT

## 2022-12-30 PROCEDURE — 97016 VASOPNEUMATIC DEVICE THERAPY: CPT

## 2022-12-30 NOTE — PROGRESS NOTES
[facility] and [facility], [place], [place] [address], [address]  Phone: [phone]  Fax [phone]      Date:  2022    Patient Name:  Tamiko Alvarado    :  1985  MRN: [id number]  Restrictions/Precautions:    Medical/Treatment Diagnosis Information:  Diagnosis: Z60.536Y (ICD-10-CM) - Superior labrum anterior-to-posterior (SLAP) tear of right shoulder  Treatment Diagnosis: M25. 611 R shoulder stiffness, M25.411 R shoulder effusion, M62.81 R shoulder weakness, M25.511 R shoulder pain  Insurance/Certification information:  PT Insurance Information: [facility], 90 visits hard limit, no auth  Physician Information:   Dr. Katheryn Monteiro  Has the plan of care been signed (Y/N):        []  Yes  [x]  No     Date of Patient follow up with Physician:  11/10/22      Is this a Progress Report:     [x]  Yes  []  No        If Yes:  Date Range for reporting period:  Beginnin22  Ending 22    Progress report will be due (10 Rx or 30 days whichever is less):        Recertification will be due (POC Duration  / 90 days whichever is less): 23      Visit # Insurance Allowable Auth Required   In-person 23 90 visits (4 used already) []  Yes [x]  No    Telehealth - - []  Yes []  No    Total            Functional Scale: FOTO shoulder score: 36/100 = 36% ability, 64% disability Date assessed:  22                                 FOTO shoulder score: 59/100 = 59% ability, 41% disability on 10/17/22                       FOTO shoulder score: 68/100 = 68% ability, 32% disability on 22      Number of Comorbidities:  [x]0     []1-2    []3+    Latex Allergy:  [x]NO      []YES  Preferred Language for Healthcare:   [x]English       []other:      Pain level: 0/10    SUBJECTIVE: I get crunchy after doing the abd exercises. I have not done anything this morning. The new exercises are just hard but no pain with them.     OBJECTIVE:   Observation: R fwd shoulder posture persists with tight pec major and minor  Test measurements:         ROM Left AROM Right PROM Right  10/24/22 R  11/7/22 R 11/28/22 R  12/30/22   Shoulder Flex 170 100 160 deg 140 (post manual 151) 166° 170   Shoulder Abd 175 70 142 deg 125 (post manual 139) 173° 175   Shoulder ER 65 30 70 deg 40 (post manual) 42 70° 75   Shoulder IR T4  67 deg 67 deg  T10       RESTRICTIONS/PRECAUTIONS: s/p R shoulder SLAP and bicep tenodesis 9/20/22    Exercises/Interventions:     Therapeutic Ex (28117) Sets/Reps/ sec Notes/CUES   Tband scap retract rows & B ext  Tband high row (at 60deg abd)   Cued for scap dress with retract   Wall crawls: flex, scaption    Standing wand IR hoiz behind back    Standing TB ER at 45°    Prone scap retract: row,   ext,   horiz abd,   45/90 ER    Resisted ROM PNF D2 supine (~50% resistance)    Wall pec stretch  DC painful and not feel stretch   Yoga ball pec stretch   Yoga ball roll outs 10 sec 10x  10 sec 10x    Standing Y I T    Standing T Ls out and front    Tball up wall 10x with 5 pulses at top    Arm Raise series to 90 deg    Arm Raise series one arm static at 90 deg other raising overhead    Tband D1 flex, D2 ext; quick    Tband 90/90 ER & IR    Upper cut wall slides    UE walk walks  UE wall walk in upper cut    Ball on wall circles: flex, abd Red med ball   Ball on wall taps: W & clock Green med ball   Quick AROM flex,& abd    Overhead taps with stick 2x15 2#    [place] vertical behind back 2x10    Bicep curls 7# 2x15    Tricep pull down 30 lbs 2x10    Low trap setting overhead Double green 2x15    B GH ER Green 2x10    Arm behind head stretch        Manual Intervention (21455)    PROM: flexion, abd, IR & ER @ 45 deg and @ 90 deg    Jt Mobs:  inferior: grad III    Pec minor stretch, manual    STM to subscap and lat with abd movement    Supine pec stretch over side of table with trigger point to pec minor    MWM S/L abd with MFR to lat and subscap    MWM S/L horiz add/abd with MFR to pec    Dry needling assessment and tx    assisted SL scap upward rotation  Resisted SL scap upward rotation Arm in abd   Abd stretch with pressure on bicep tendon    STM/trigger point:  mid trap, rhomboids, s   Seated MWM shoulder flexion, GH lateral glide & scap upward rot    Shoulder abd with GH post glide        NMR re-education (76373) CUES NEEDED   Lat pull down stool sit Cues to prep with scap then arms, reverse on release   Supine 90° shoulder flexion  perturbations    Bird dog    plank shoulder taps 2x10     Tactile cue for SA   Low trap setting on weight machine Wall one for home   Bosu plank hold  Bosu with feet walks  Bosu with hip flexion    BOSU push-up plus    Body Dell: flex vert, abd horiz 30\" ea    Tball roll out lift prone on table    Horz abd    Wall abd snow angels Cue to use scap and not shrug at end   Praying mantis with elbows extended 10x2    Elbow plank on yoga ball NV        Therapeutic Activity (83332)                    IFC    Game Ready 15 min      Consent signed 11/21/2022 and precautions addressed. See media tab.    Needle Size Technique Notes IES   Site 1 .[id number] [] Pistoning / []  Threading  [x]  Winding/Coning  []    Site 2 .3x40mm [] Pistoning / [x]  Threading  [x]  Winding/Coning []    Site 3 .12j75sm [] Pistoning / [x]  Threading  []  Winding/Coning Twitch response, major release []    Site 4 .11k36mt [] Pistoning / [x]  Threading  [x]  Winding/Coning Pulling skin away and using needle in horizontal to torso direction []    Site 5 .16t70kk [] Pistoning / [x]  Threading  [x]  Winding/Coning Pulling skin away and using needle in horizontal to torso direction []    Site 6  [] Pistoning / []  Threading  []  Winding/Coning  []    Site 7  [] Pistoning / []  Threading  []  Winding/Coning  []    Site 8  [] Pistoning / []  Threading  []  Winding/Coning  []    Site 9  [] Pistoning / []  Threading  []  Winding/Coning  []    Site 10  [] Pistoning / []  Threading  [] Winding/Coning  []      **The above techniques were used to restore functional range of motion, reduce muscle spasm, and induce healing in the corresponding musculature by means of intramuscular mobilization. Clean Technique was utilized today while applying the Dry needling treatment. The treatment sites where cleaned with 70% solution of isopropyl alcohol. The PT washed their hands and utilized treatment gloves along with hand  prior to inserting the needles. All needles where removed and discarded in the appropriate sharps container. MD has given verbal and/or written approval for this treatment. **          ** Educated patient on anatomy, trigger point etiology, expectations for TDN (bruising, soreness, etc), outcomes, and recommendations for exercise. **     Therapeutic Exercise and NMR EXR  [x] (23210) Provided verbal/tactile cueing for activities related to strengthening, flexibility, endurance, ROM  for improvements in scapular, scapulothoracic and UE control with self care, reaching, carrying, lifting, house/yardwork, driving/computer work.    [] (59666) Provided verbal/tactile cueing for activities related to improving balance, coordination, kinesthetic sense, posture, motor skill, proprioception  to assist with  scapular, scapulothoracic and UE control with self care, reaching, carrying, lifting, house/yardwork, driving/computer work. Therapeutic Activities:    [] (79945 or 85159) Provided verbal/tactile cueing for activities related to improving balance, coordination, kinesthetic sense, posture, motor skill, proprioception and motor activation to allow for proper function of scapular, scapulothoracic and UE control with self care, carrying, lifting, driving/computer work.      Home Exercise Program:    [x] (66621) Reviewed/Progressed HEP activities related to strengthening, flexibility, endurance, ROM of scapular, scapulothoracic and UE control with self care, reaching, carrying, lifting, house/yardwork, driving/computer work  [] (19145) Reviewed/Progressed HEP activities related to improving balance, coordination, kinesthetic sense, posture, motor skill, proprioception of scapular, scapulothoracic and UE control with self care, reaching, carrying, lifting, house/yardwork, driving/computer work      Access Code: [email or web address]  URL: [email or web address]  Date: 12/30/2022  Prepared by: Lynnette Lopez    Exercises  Standing Shoulder Internal Rotation Stretch with Dowel - 1 x daily - 7 x weekly - 3 sets - 10 reps  Serratus Activation at Wall - 1 x daily - 7 x weekly - 3 sets - 10 reps  Forearm Walks on Wall with Resistance Band - 1 x daily - 7 x weekly - 3 sets - 10 reps  Standing Tricep Extensions with Resistance - 1 x daily - 7 x weekly - 3 sets - 15 reps  Standing Bicep Curls Supinated with Dumbbells - 1 x daily - 7 x weekly - 3 sets - 15 reps  Standing Low Trap Setting with Resistance at [address] - 1 x daily - 7 x weekly - 3 sets - 10-15 reps  Shoulder External Rotation and Scapular Retraction with Resistance - 1 x daily - 7 x weekly - 3 sets - 15 reps  Supine Chest Stretch with Elbows Bent - 1 x daily - 7 x weekly - 3 sets - 1 reps - 1 min hold  Full Plank with Shoulder Taps - 1 x daily - 7 x weekly - 3 sets - 10 reps  Praying Mantis - 1 x daily - 7 x weekly - 3 sets - 10 reps      Manual Treatments:  PROM / STM / Oscillations-Mobs:  G-I, II, III, IV (PA's, Inf., Post.)  [] (02213) Provided manual therapy to mobilize soft tissue/joints of cervical/CT, scapular GHJ and UE for the purpose of modulating pain, promoting relaxation,  increasing ROM, reducing/eliminating soft tissue swelling/inflammation/restriction, improving soft tissue extensibility and allowing for proper ROM for normal function with self care, reaching, carrying, lifting, house/yardwork, driving/computer work    Modalities: Game Ready to address pain and remaining inflammation    Trigger point Dry Needling:  fine needle insertion into myofascial trigger points to stimulate a healing response  [] (51948) Needle insertion without injection: 1 or 2 muscles  [] (52853) Needle insertion without injection: 3 or more muscles    Charges  Timed Code Treatment Minutes: 53   Total Treatment Minutes: 68     [] EVAL (LOW) 81110   [] EVAL (MOD) 35045   [] EVAL (HIGH) 33457   [] RE-EVAL     [x] XJ(53102) x 3  [] IONTO  [x] NMR (33257) x 1  [x] VASO  [] Manual (10153) x   [] Other:  [] TA x      [] Mech Traction (53377)  [] ES(attended) (79517)      [] ES (un) (66493):   [] TDN needle insertion    Modalities:        [] GR/ESU 15 min    [] GR 15 min  [] ESU     [] CP    [] MHP    [] declined     GOALS:  Patient stated goal: playing overhead volleyball  [] Progressing: [] Met: [x] Not Met: [] Adjusted    Therapist goals for Patient:   Short Term Goals: To be achieved in: 2 weeks  1. Independent in HEP and progression per patient tolerance, in order to prevent re-injury. [] Progressing: [x] Met: [] Not Met: [] Adjusted  2. Patient will have a decrease in pain to facilitate improvement in movement, function, and ADLs as indicated by Functional Deficits. [] Progressing: [x] Met: [] Not Met: [] Adjusted    Long Term Goals: To be achieved in: 12 weeks  1. Disability index score of 10% or better for the FOTO shoulder score to assist with reaching prior level of function. [] Progressing: [] Met: [x] Not Met: [] Adjusted  2. Patient will demonstrate increased AROM to WNL and symmetrical to uninvolved limb to allow for proper joint functioning as indicated by patients Functional Deficits. [x] Progressing: [] Met: [] Not Met: [] Adjusted  3. Patient will demonstrate an increase in Strength to 4+/5 to allow for proper functional mobility as indicated by patients Functional Deficits. [] Progressing: [] Met: [x] Not Met: [] Adjusted  4. Patient will be able to lift a 5lbs object overhead with normal scapulohumeral rhythm without increased symptoms or restriction.    [] Progressing: [] Met: [x] Not Met: [] Adjusted  5. Pt will be able to perform UE plyometric activity in [place] or [facility] without increased symptoms or restriction to assist her in returning to an active lifestyle and PLOF. [] Progressing: [] Met: [x] Not Met: [] Adjusted     Progression Towards Functional goals:  [x] Patient is progressing as expected towards functional goals listed. [] Progression is slowed due to complexities listed. [] Progression has been slowed due to co-morbidities. [] Plan just implemented, too soon to assess goals progression  [] Other:     ASSESSMENT:  Pts ROM is equal in quantity to the opposite side and is almost equal to quality. The scapula has some minor tightness at end range overhead. . Pt still has some complaints of stiffness throughout the day as it \"warms up\". Pt wants to return back to volleyball so plan is to progress strength overhead and general Wbing stability to also allow for functional tasks around the house and to  her kids. Overall Progression Towards Functional goals/ Treatment Progress Update:  [x] Patient is progressing as expected towards functional goals listed. [] Progression is slowed due to complexities/Impairments listed. [] Progression has been slowed due to co-morbidities.   [] Plan just implemented, too soon to assess goals progression <30days   [] Goals require adjustment due to lack of progress  [] Patient is not progressing as expected and requires additional follow up with physician  [] Other    Prognosis for POC: [x] Good [] Fair  [] Poor      Patient requires continued skilled intervention: [x] Yes  [] No    Treatment/Activity Tolerance:  [x] Patient able to complete treatment  [] Patient limited by fatigue  [] Patient limited by pain    [] Patient limited by other medical complications  [] Other:     Return to Play: (if applicable)   []  Stage 1: Intro to Strength   []  Stage 2: Return to Run and Strength   []  Stage 3: Return to Jump and Strength   []  Stage 4: Dynamic Strength and Agility   []  Stage 5: Sport Specific Training     []  Ready to Return to Play, Meets All Above Stages   []  Not Ready for Return to Sports   Comments:              PLAN: Pt will reduce to once week for remainder of POC as she progress with strengthening and not as reliant on manual techniques. She will also consider a transition to the [facility] program when ready for a porgression back to volleyball.   [] Continue per plan of care [x] Alter current plan (see comments above)  [] Plan of care initiated [] Hold pending MD visit [] Discharge      Electronically signed by:  James Quiñones, PT  Patricio Dejesus, SPT Therapist was present, directed the patient's care, made skilled judgement, and was responsible for assessment and treatment of the patient. Note: If patient does not return for scheduled/ recommended follow up visits, this note will serve as a discharge from care along with most recent update on progress.

## 2023-01-04 ENCOUNTER — HOSPITAL ENCOUNTER (OUTPATIENT)
Dept: PHYSICAL THERAPY | Age: 38
Setting detail: THERAPIES SERIES
Discharge: HOME | End: 2023-01-04
Payer: COMMERCIAL

## 2023-01-04 PROCEDURE — 97110 THERAPEUTIC EXERCISES: CPT

## 2023-01-04 PROCEDURE — 97112 NEUROMUSCULAR REEDUCATION: CPT

## 2023-01-04 PROCEDURE — 97016 VASOPNEUMATIC DEVICE THERAPY: CPT

## 2023-01-04 NOTE — FLOWSHEET NOTE
[facility] [address]  Phone: [phone]  Fax [phone]      Date:  2023    Patient Name:  Bernardino Marcus    :  1985  MRN: [id number]  Restrictions/Precautions:    Medical/Treatment Diagnosis Information:  Diagnosis: Z48.322L (ICD-10-CM) - Superior labrum anterior-to-posterior (SLAP) tear of right shoulder  Treatment Diagnosis: M25. 611 R shoulder stiffness, M25.411 R shoulder effusion, M62.81 R shoulder weakness, M25.511 R shoulder pain  Insurance/Certification information:  PT Insurance Information: [facility], 90 visits hard limit, no auth  Physician Information:   Dr. Lucero Weeks  Has the plan of care been signed (Y/N):        []  Yes  [x]  No     Date of Patient follow up with Physician:  11/10/22      Is this a Progress Report:     []  Yes  [x]  No        If Yes:  Date Range for reporting period:  Beginnin22  Ending     Progress report will be due (10 Rx or 30 days whichever is less):        Recertification will be due (POC Duration  / 90 days whichever is less): 23      Visit # Insurance Allowable Auth Required   In-person )  () 90 visits (4 used already) []  Yes [x]  No    Telehealth - - []  Yes []  No    Total            Functional Scale: FOTO shoulder score: 36/100 = 36% ability, 64% disability Date assessed:  22                                 FOTO shoulder score: 59/100 = 59% ability, 41% disability on 10/17/22                       FOTO shoulder score: 68/100 = 68% ability, 32% disability on 22      Number of Comorbidities:  [x]0     []1-2    []3+    Latex Allergy:  [x]NO      []YES  Preferred Language for Healthcare:   [x]English       []other:      Pain level: 0/10    SUBJECTIVE: I was a little stiff this morning but I had not done anything this morning to loosen it up.     OBJECTIVE:   Observation: R fwd shoulder posture persists with tight pec major and minor  Test measurements:         ROM Left AROM Right PROM Right  10/24/22 R  11/7/22 R 11/28/22 R  12/30/22   Shoulder Flex 170 100 160 deg 140 (post manual 151) 166° 170   Shoulder Abd 175 70 142 deg 125 (post manual 139) 173° 175   Shoulder ER 65 30 70 deg 40 (post manual) 42 70° 75   Shoulder IR T4  67 deg 67 deg  T10       RESTRICTIONS/PRECAUTIONS: s/p R shoulder SLAP and bicep tenodesis 9/20/22    Exercises/Interventions:     Therapeutic Ex (01191) Sets/Reps/ sec Notes/CUES   Tband scap retract rows & B ext  Tband high row (at 60deg abd)   Cued for scap dress with retract   Wall crawls: flex, scaption    Standing wand IR hoiz behind back    Standing TB ER at 45°    Prone scap retract: row,   ext,   horiz abd,   45/90 ER    Resisted ROM PNF D2 supine (~50% resistance)    Wall pec stretch  DC painful and not feel stretch   Yoga ball pec stretch   Yoga ball roll outs 10 sec 10x  10 sec 10x    Standing Y I T 2 lb weight 8x2    Standing T Ls out and front    Tball up wall    Arm Raise series to 90 deg    Arm Raise series one arm static at 90 deg other raising overhead    Tband D1 flex, D2 ext; quick    Tband 90/90 ER     Upper cut wall slides Red 2x10    UE walk walks  UE wall walk in upper cut OVL 2x2 laps  Red 2x1 laps    Ball on wall circles: flex, abd Red med ball   Ball on wall taps: W & clock Green med ball   Quick AROM flex,& abd    Overhead taps with stick    [place] vertical behind back    Bicep curls    Tricep pull down  Bent over tricep ext 3lbs 3x10    Low trap setting overhead    B GH ER    Arm behind head stretch        Manual Intervention (24241)    PROM: flexion, abd, IR & ER @ 45 deg and @ 90 deg    Jt Mobs:  inferior: grad III    Pec minor stretch, manual    STM to subscap and lat with abd movement    Supine pec stretch over side of table with trigger point to pec minor    MWM S/L abd with MFR to lat and subscap    MWM S/L horiz add/abd with MFR to pec    Dry needling assessment and tx    assisted SL scap upward rotation  Resisted SL scap upward rotation Arm in abd   Abd stretch with pressure on bicep tendon    STM/trigger point:  mid trap, rhomboids, s   Seated MWM shoulder flexion, GH lateral glide & scap upward rot    Shoulder abd with GH post glide        NMR re-education (83289) CUES NEEDED   Lat pull down stool sit Cues to prep with scap then arms, reverse on release   Supine 90° shoulder flexion  perturbations    Bird dog    plank shoulder taps 2x10     Tactile cue for SA   Low trap setting on weight machine Wall one for home   Bosu plank hold  Bosu with feet walks  Bosu with hip flexion 30 sec hold   10 walk in and out, 5x hip flexion R and L     BOSU push-up plus    Body Dell: flex vert, abd horiz, pulse with abd 30\" ea    Tball roll out lift prone on table    Horz abd    Wall abd snow angels Cue to use scap and not shrug at end   Praying mantis with elbows extended 10x2    Elbow plank on yoga ball NV        Therapeutic Activity (50747)                    IFC    Game Ready 15 min      Consent signed 11/21/2022 and precautions addressed. See media tab.    Needle Size Technique Notes IES   Site 1 .[id number] [] Pistoning / []  Threading  [x]  Winding/Coning  []    Site 2 .3x40mm [] Pistoning / [x]  Threading  [x]  Winding/Coning []    Site 3 .[id number] [] Pistoning / [x]  Threading  []  Winding/Coning Twitch response, major release []    Site 4 .06s08cc [] Pistoning / [x]  Threading  [x]  Winding/Coning Pulling skin away and using needle in horizontal to torso direction []    Site 5 .90s80nj [] Pistoning / [x]  Threading  [x]  Winding/Coning Pulling skin away and using needle in horizontal to torso direction []    Site 6  [] Pistoning / []  Threading  []  Winding/Coning  []    Site 7  [] Pistoning / []  Threading  []  Winding/Coning  []    Site 8  [] Pistoning / []  Threading  []  Winding/Coning  []    Site 9  [] Pistoning / []  Threading  []  Winding/Coning  []    Site 10  [] Pistoning / []  Threading  []  Winding/Coning  []      **The above techniques were used to restore functional range of motion, reduce muscle spasm, and induce healing in the corresponding musculature by means of intramuscular mobilization. Clean Technique was utilized today while applying the Dry needling treatment. The treatment sites where cleaned with 70% solution of isopropyl alcohol. The PT washed their hands and utilized treatment gloves along with hand  prior to inserting the needles. All needles where removed and discarded in the appropriate sharps container. MD has given verbal and/or written approval for this treatment. **          ** Educated patient on anatomy, trigger point etiology, expectations for TDN (bruising, soreness, etc), outcomes, and recommendations for exercise. **     Therapeutic Exercise and NMR EXR  [x] (66464) Provided verbal/tactile cueing for activities related to strengthening, flexibility, endurance, ROM  for improvements in scapular, scapulothoracic and UE control with self care, reaching, carrying, lifting, house/yardwork, driving/computer work.    [] (96168) Provided verbal/tactile cueing for activities related to improving balance, coordination, kinesthetic sense, posture, motor skill, proprioception  to assist with  scapular, scapulothoracic and UE control with self care, reaching, carrying, lifting, house/yardwork, driving/computer work. Therapeutic Activities:    [] (66090 or 31492) Provided verbal/tactile cueing for activities related to improving balance, coordination, kinesthetic sense, posture, motor skill, proprioception and motor activation to allow for proper function of scapular, scapulothoracic and UE control with self care, carrying, lifting, driving/computer work.      Home Exercise Program:    [x] (04979) Reviewed/Progressed HEP activities related to strengthening, flexibility, endurance, ROM of scapular, scapulothoracic and UE control with self care, reaching, carrying, lifting, house/yardwork, driving/computer work  [] (79706) Reviewed/Progressed HEP activities related to improving balance, coordination, kinesthetic sense, posture, motor skill, proprioception of scapular, scapulothoracic and UE control with self care, reaching, carrying, lifting, house/yardwork, driving/computer work        Access Code: [email or web address]  URL: [email or web address]  Date: 01/04/2023  Prepared by: Osman Aye    Exercises  Standing Bent Over Triceps Extension - 1 x daily - 7 x weekly - 3 sets - 10 reps  Praying Mantis - 1 x daily - 7 x weekly - 3 sets - 10 reps  Full Plank with Shoulder Taps - 1 x daily - 7 x weekly - 3 sets - 10 reps            Access Code: [email or web address]  URL: [employer]/  Date: 12/30/2022  Prepared by: Osman Aye    Exercises  Standing Shoulder Internal Rotation Stretch with Dowel - 1 x daily - 7 x weekly - 3 sets - 10 reps  Serratus Activation at Wall - 1 x daily - 7 x weekly - 3 sets - 10 reps  Forearm Walks on Wall with Resistance Band - 1 x daily - 7 x weekly - 3 sets - 10 reps  Standing Tricep Extensions with Resistance - 1 x daily - 7 x weekly - 3 sets - 15 reps  Standing Bicep Curls Supinated with Dumbbells - 1 x daily - 7 x weekly - 3 sets - 15 reps  Standing Low Trap Setting with Resistance at [address] - 1 x daily - 7 x weekly - 3 sets - 10-15 reps  Shoulder External Rotation and Scapular Retraction with Resistance - 1 x daily - 7 x weekly - 3 sets - 15 reps  Supine Chest Stretch with Elbows Bent - 1 x daily - 7 x weekly - 3 sets - 1 reps - 1 min hold  Full Plank with Shoulder Taps - 1 x daily - 7 x weekly - 3 sets - 10 reps  Praying Mantis - 1 x daily - 7 x weekly - 3 sets - 10 reps      Manual Treatments:  PROM / STM / Oscillations-Mobs:  G-I, II, III, IV (PA's, Inf., Post.)  [] (51547) Provided manual therapy to mobilize soft tissue/joints of cervical/CT, scapular GHJ and UE for the purpose of modulating pain, promoting relaxation,  increasing ROM, reducing/eliminating soft tissue swelling/inflammation/restriction, improving soft tissue extensibility and allowing for proper ROM for normal function with self care, reaching, carrying, lifting, house/yardwork, driving/computer work    Modalities: Game Ready to address pain and remaining inflammation    Trigger point Dry Needling:  fine needle insertion into myofascial trigger points to stimulate a healing response  [] (27699) Needle insertion without injection: 1 or 2 muscles  [] (70449) Needle insertion without injection: 3 or more muscles    Charges  Timed Code Treatment Minutes: 45   Total Treatment Minutes: 60     [] EVAL (LOW) 93103   [] EVAL (MOD) 68025   [] EVAL (HIGH) 73054   [] RE-EVAL     [x] NH(32893) x 2  [] IONTO  [x] NMR (64820) x 1  [x] VASO  [] Manual (29862) x   [] Other:  [] TA x      [] Mech Traction (18928)  [] ES(attended) (62170)      [] ES (un) (96111):   [] TDN needle insertion    Modalities:        [] GR/ESU 15 min    [] GR 15 min  [] ESU     [] CP    [] MHP    [] declined     GOALS:  Patient stated goal: playing overhead volleyball  [] Progressing: [] Met: [x] Not Met: [] Adjusted    Therapist goals for Patient:   Short Term Goals: To be achieved in: 2 weeks  1. Independent in HEP and progression per patient tolerance, in order to prevent re-injury. [] Progressing: [x] Met: [] Not Met: [] Adjusted  2. Patient will have a decrease in pain to facilitate improvement in movement, function, and ADLs as indicated by Functional Deficits. [] Progressing: [x] Met: [] Not Met: [] Adjusted    Long Term Goals: To be achieved in: 12 weeks  1. Disability index score of 10% or better for the FOTO shoulder score to assist with reaching prior level of function. [] Progressing: [] Met: [x] Not Met: [] Adjusted  2. Patient will demonstrate increased AROM to WNL and symmetrical to uninvolved limb to allow for proper joint functioning as indicated by patients Functional Deficits.    [x] Progressing: [] Met: [] Not Met: [] Adjusted  3. Patient will demonstrate an increase in Strength to 4+/5 to allow for proper functional mobility as indicated by patients Functional Deficits. [] Progressing: [] Met: [x] Not Met: [] Adjusted  4. Patient will be able to lift a 5lbs object overhead with normal scapulohumeral rhythm without increased symptoms or restriction. [] Progressing: [] Met: [x] Not Met: [] Adjusted  5. Pt will be able to perform UE plyometric activity in [place] or [facility] without increased symptoms or restriction to assist her in returning to an active lifestyle and PLOF. [] Progressing: [] Met: [x] Not Met: [] Adjusted     Progression Towards Functional goals:  [x] Patient is progressing as expected towards functional goals listed. [] Progression is slowed due to complexities listed. [] Progression has been slowed due to co-morbidities. [] Plan just implemented, too soon to assess goals progression  [] Other:     ASSESSMENT:  Pt scapular rhythm is looking more normalized. Pt fatigues with WB and scapular strengthening exercises still. Overall Progression Towards Functional goals/ Treatment Progress Update:  [x] Patient is progressing as expected towards functional goals listed. [] Progression is slowed due to complexities/Impairments listed. [] Progression has been slowed due to co-morbidities.   [] Plan just implemented, too soon to assess goals progression <30days   [] Goals require adjustment due to lack of progress  [] Patient is not progressing as expected and requires additional follow up with physician  [] Other    Prognosis for POC: [x] Good [] Fair  [] Poor      Patient requires continued skilled intervention: [x] Yes  [] No    Treatment/Activity Tolerance:  [x] Patient able to complete treatment  [] Patient limited by fatigue  [] Patient limited by pain    [] Patient limited by other medical complications  [] Other:     Return to Play: (if applicable)   []  Stage 1: Intro to Strength   []  Stage 2: Return to Run and Strength   []  Stage 3: Return to Jump and Strength   []  Stage 4: Dynamic Strength and Agility   []  Stage 5: Sport Specific Training     []  Ready to Return to Play, Meets All Above Stages   []  Not Ready for Return to Sports   Comments:              PLAN: Pt will reduce to once week for remainder of POC as she progress with strengthening and not as reliant on manual techniques. She will also consider a transition to the [facility] program when ready for a porgression back to volleyball. [x] Continue per plan of care [] Alter current plan (see comments above)  [] Plan of care initiated [] Hold pending MD visit [] Discharge      Electronically signed by:  Mireya Christie, PT  Javon Landa, EKATERINA Therapist was present, directed the patient's care, made skilled judgement, and was responsible for assessment and treatment of the patient. Note: If patient does not return for scheduled/ recommended follow up visits, this note will serve as a discharge from care along with most recent update on progress.

## 2023-01-06 ENCOUNTER — APPOINTMENT (OUTPATIENT)
Dept: PHYSICAL THERAPY | Age: 38
End: 2023-01-06
Payer: COMMERCIAL

## 2023-01-09 ENCOUNTER — HOSPITAL ENCOUNTER (OUTPATIENT)
Dept: PHYSICAL THERAPY | Age: 38
Setting detail: THERAPIES SERIES
Discharge: HOME | End: 2023-01-09
Payer: COMMERCIAL

## 2023-01-09 PROCEDURE — 97110 THERAPEUTIC EXERCISES: CPT

## 2023-01-09 PROCEDURE — 97140 MANUAL THERAPY 1/> REGIONS: CPT

## 2023-01-09 NOTE — FLOWSHEET NOTE
[facility], [address], [address]  Phone: [phone]  Fax [phone]      Date:  2023    Patient Name:  Princess Garza    :  1985  MRN: [id number]  Restrictions/Precautions:    Medical/Treatment Diagnosis Information:  Diagnosis: U70.855F (ICD-10-CM) - Superior labrum anterior-to-posterior (SLAP) tear of right shoulder  Treatment Diagnosis: M25. 611 R shoulder stiffness, M25.411 R shoulder effusion, M62.81 R shoulder weakness, M25.511 R shoulder pain  Insurance/Certification information:  PT Insurance Information: [employer], 90 visits hard limit, no auth  Physician Information:   Dr. Eva Rainey  Has the plan of care been signed (Y/N):        []  Yes  [x]  No     Date of Patient follow up with Physician:  11/10/22      Is this a Progress Report:     []  Yes  [x]  No        If Yes:  Date Range for reporting period:  Beginnin22  Ending     Progress report will be due (10 Rx or 30 days whichever is less):        Recertification will be due (POC Duration  / 90 days whichever is less): 23      Visit # Insurance Allowable Auth Required   In-person  ()  () 90 visits (4 used already) []  Yes [x]  No    Telehealth - - []  Yes []  No    Total            Functional Scale: FOTO shoulder score: 36/100 = 36% ability, 64% disability Date assessed:  22                                 FOTO shoulder score: 59/100 = 59% ability, 41% disability on 10/17/22                       FOTO shoulder score: 68/100 = 68% ability, 32% disability on 22      Number of Comorbidities:  [x]0     []1-2    []3+    Latex Allergy:  [x]NO      []YES  Preferred Language for Healthcare:   [x]English       []other:      Pain level: 0/10    SUBJECTIVE: Pt feels her shoulder is overall doing really well.  She has recently been able to lift her young sons without pain but can tell still a strength difference in the R UE. The only actual motions that feel restricted are reaching behind her back and reaching fully across her body.     OBJECTIVE:   Observation: R fwd shoulder posture persists with tight pec major and minor  Test measurements:         ROM Left AROM Right PROM Right  10/24/22 R  11/7/22 R 11/28/22 R  12/30/22   Shoulder Flex 170 100 160 deg 140 (post manual 151) 166° 170   Shoulder Abd 175 70 142 deg 125 (post manual 139) 173° 175   Shoulder ER 65 30 70 deg 40 (post manual) 42 70° 75   Shoulder IR T4  67 deg 67 deg  T10       RESTRICTIONS/PRECAUTIONS: s/p R shoulder SLAP and bicep tenodesis 9/20/22    Exercises/Interventions:     Therapeutic Ex (94289) Sets/Reps/ sec Notes/CUES   Tband scap retract rows & B ext  Tband high row (at 60deg abd)   Cued for scap dress with retract   Wall crawls: flex, scaption    Standing wand IR hoiz behind back    Standing TB ER at 45°    Prone scap retract: row,   ext,   horiz abd,   45/90 ER    Resisted ROM PNF D2 supine (~50% resistance)    Wall pec stretch  DC painful and not feel stretch   Yoga ball pec stretch   Yoga ball roll outs    Standing Y I T    Standing T Ls out and front    Tball up wall    Arm Raise series to 90 deg    Arm Raise series one arm static at 90 deg other raising overhead    Tband D1 flex, D2 ext; quick    Tband 90/90 ER     Upper cut wall slides    UE walk walks  UE wall walk in upper cut    Ball on wall circles: flex, abd Red med ball   Ball on wall taps: W & clock Green med ball   Quick AROM flex,& abd    Overhead taps with stick    [place] vertical behind back    Bicep curls    Tricep pull down  Bent over tricep ext 3lbs 3x10    Low trap setting overhead    B GH ER    Arm behind head stretch    Standing arm alphabet: flex & abd 2# 2 reps     Tband bicep curl + overhead press  Tband abd Green 2x10  Red 2x10    BOSU push-ups 2x2x5    Overhead kettlebell w/ lunges 7.5# 2x20    Lat pulldown  30# 2x10    IR behind the back lift offs 1x20        Manual Intervention ([phone]) Total 12 min    PROM: flexion, abd, IR & ER @ 45 deg and @ 90 deg 3 min    Jt Mobs: posterior  inferior: grad III 1 min    Pec minor stretch, manual    STM infraspin , teres, lat dorsi 8 min    Supine pec stretch over side of table with trigger point to pec minor    MWM S/L abd with MFR to lat and subscap    MWM S/L horiz add/abd with MFR to pec    Dry needling assessment and tx    assisted SL scap upward rotation  Resisted SL scap upward rotation Arm in abd   Abd stretch with pressure on bicep tendon    STM/trigger point:  mid trap, rhomboids, s   Seated MWM shoulder flexion, GH lateral glide & scap upward rot    Shoulder abd with GH post glide        NMR re-education (65261) CUES NEEDED   Lat pull down stool sit Cues to prep with scap then arms, reverse on release   Supine 90° shoulder flexion  perturbations    Bird dog    plank shoulder taps 2x10     Tactile cue for SA   Low trap setting on weight machine Wall one for home   Bosu plank hold  Bosu with feet walks  Bosu with hip flexion 30 sec hold   10 walk in and out, 5x hip flexion R and L     BOSU push-up plus    Body Dell: flex vert, abd horiz, pulse with abd 30\" ea    Tball roll out lift prone on table    Horz abd    Wall abd snow angels Cue to use scap and not shrug at end   Praying mantis with elbows extended 10x2    Elbow plank on yoga ball NV        Therapeutic Activity (14785)                    IFC    Game Ready 15 min      Consent signed 11/21/2022 and precautions addressed. See media tab.    Needle Size Technique Notes IES   Site 1 .25y44gy [] Pistoning / []  Threading  [x]  Winding/Coning  []    Site 2 .3x40mm [] Pistoning / [x]  Threading  [x]  Winding/Coning []    Site 3 .99m08ea [] Pistoning / [x]  Threading  []  Winding/Coning Twitch response, major release []    Site 4 .83g89vq [] Pistoning / [x]  Threading  [x]  Winding/Coning Pulling skin away and using needle in horizontal to torso direction []    Site 5 .67x69tl [] Pistoning / [x]  Threading  [x]  Winding/Coning Pulling skin away and using needle in horizontal to torso direction []    Site 6  [] Pistoning / []  Threading  []  Winding/Coning  []    Site 7  [] Pistoning / []  Threading  []  Winding/Coning  []    Site 8  [] Pistoning / []  Threading  []  Winding/Coning  []    Site 9  [] Pistoning / []  Threading  []  Winding/Coning  []    Site 10  [] Pistoning / []  Threading  []  Winding/Coning  []      **The above techniques were used to restore functional range of motion, reduce muscle spasm, and induce healing in the corresponding musculature by means of intramuscular mobilization.  Clean Technique was utilized today while applying the Dry needling treatment. The treatment sites where cleaned with 70% solution of isopropyl alcohol. The PT washed their hands and utilized treatment gloves along with hand  prior to inserting the needles.  All needles where removed and discarded in the appropriate sharps container.  MD has given verbal and/or written approval for this treatment. **          ** Educated patient on anatomy, trigger point etiology, expectations for TDN (bruising, soreness, etc), outcomes, and recommendations for exercise. **     Therapeutic Exercise and NMR EXR  [x] (61267) Provided verbal/tactile cueing for activities related to strengthening, flexibility, endurance, ROM  for improvements in scapular, scapulothoracic and UE control with self care, reaching, carrying, lifting, house/yardwork, driving/computer work.    [] (67202) Provided verbal/tactile cueing for activities related to improving balance, coordination, kinesthetic sense, posture, motor skill, proprioception  to assist with  scapular, scapulothoracic and UE control with self care, reaching, carrying, lifting, house/yardwork, driving/computer work.    Therapeutic Activities:    [] (84060 or 83609) Provided verbal/tactile cueing for activities related to improving balance, coordination,  kinesthetic sense, posture, motor skill, proprioception and motor activation to allow for proper function of scapular, scapulothoracic and UE control with self care, carrying, lifting, driving/computer work. Home Exercise Program:    [x] (03321) Reviewed/Progressed HEP activities related to strengthening, flexibility, endurance, ROM of scapular, scapulothoracic and UE control with self care, reaching, carrying, lifting, house/yardwork, driving/computer work  [] (95063) Reviewed/Progressed HEP activities related to improving balance, coordination, kinesthetic sense, posture, motor skill, proprioception of scapular, scapulothoracic and UE control with self care, reaching, carrying, lifting, house/yardwork, driving/computer work      Access Code: [email or web address]  URL: [email or web address]  Date: 01/09/2023  Prepared by: Bossman Massing    Exercises  Standing Bicep Curls with Resistance Band with PLB - 1 x daily - 7 x weekly - 3 sets - 10 reps  Standing Shoulder Abduction with Resistance - 1 x daily - 7 x weekly - 3 sets - 10 reps  Shoulder Alphabet with Dumbbell - 1 x daily - 7 x weekly - 3 sets - 1 reps  Mini Lunge - 1 x daily - 7 x weekly - 3 sets - 10 reps  Push Up - 1 x daily - 7 x weekly - 3 sets - 10 reps  Seated Lat Pull Down with Resistance - Elbows Bent - 1 x daily - 7 x weekly - 3 sets - 10 reps    Manual Treatments:  PROM / STM / Oscillations-Mobs:  G-I, II, III, IV (PA's, Inf., Post.)  [x] (87249) Provided manual therapy to mobilize soft tissue/joints of cervical/CT, scapular GHJ and UE for the purpose of modulating pain, promoting relaxation,  increasing ROM, reducing/eliminating soft tissue swelling/inflammation/restriction, improving soft tissue extensibility and allowing for proper ROM for normal function with self care, reaching, carrying, lifting, house/yardwork, driving/computer work    Modalities: Game Ready to address pain and remaining inflammation    Trigger point Dry Needling:  fine needle insertion into myofascial trigger points to stimulate a healing response  [] (94972) Needle insertion without injection: 1 or 2 muscles  [] (72292) Needle insertion without injection: 3 or more muscles    Charges  Timed Code Treatment Minutes: 45   Total Treatment Minutes: 45     [] EVAL (LOW) 79907   [] EVAL (MOD) 93394   [] EVAL (HIGH) 00932   [] RE-EVAL     [x] CH(96691) x 2  [] IONTO  [] NMR (42650) x   [x] VASO  [x] Manual (57199) x 1  [] Other:  [] TA x      [] Mech Traction (32531)  [] ES(attended) (02364)      [] ES (un) (13791):   [] TDN needle insertion    Modalities:        [] GR/ESU 15 min    [] GR 15 min  [] ESU     [] CP    [] MHP    [] declined     GOALS:  Patient stated goal: playing overhead volleyball  [] Progressing: [] Met: [x] Not Met: [] Adjusted    Therapist goals for Patient:   Short Term Goals: To be achieved in: 2 weeks  1. Independent in HEP and progression per patient tolerance, in order to prevent re-injury. [] Progressing: [x] Met: [] Not Met: [] Adjusted  2. Patient will have a decrease in pain to facilitate improvement in movement, function, and ADLs as indicated by Functional Deficits. [] Progressing: [x] Met: [] Not Met: [] Adjusted    Long Term Goals: To be achieved in: 12 weeks  1. Disability index score of 10% or better for the FOTO shoulder score to assist with reaching prior level of function. [] Progressing: [] Met: [x] Not Met: [] Adjusted  2. Patient will demonstrate increased AROM to WNL and symmetrical to uninvolved limb to allow for proper joint functioning as indicated by patients Functional Deficits. [x] Progressing: [] Met: [] Not Met: [] Adjusted  3. Patient will demonstrate an increase in Strength to 4+/5 to allow for proper functional mobility as indicated by patients Functional Deficits. [] Progressing: [] Met: [x] Not Met: [] Adjusted  4.  Patient will be able to lift a 5lbs object overhead with normal scapulohumeral rhythm without increased symptoms or restriction. [] Progressing: [] Met: [x] Not Met: [] Adjusted  5. Pt will be able to perform UE plyometric activity in [place] or [facility] without increased symptoms or restriction to assist her in returning to an active lifestyle and PLOF. [] Progressing: [] Met: [x] Not Met: [] Adjusted     Progression Towards Functional goals:  [x] Patient is progressing as expected towards functional goals listed. [] Progression is slowed due to complexities listed. [] Progression has been slowed due to co-morbidities. [] Plan just implemented, too soon to assess goals progression  [] Other:     ASSESSMENT:  Pt had some tightness and trigger points restricting comfortable end range cross body motion and manual work helped resolve this. Continue to progress strength in zones pt reports noticeable functional differences. Once she f/u with physician in a couple weeks if all is well she cora then transition to our [facility] program to transition back to volleyball. Overall Progression Towards Functional goals/ Treatment Progress Update:  [x] Patient is progressing as expected towards functional goals listed. [] Progression is slowed due to complexities/Impairments listed. [] Progression has been slowed due to co-morbidities.   [] Plan just implemented, too soon to assess goals progression <30days   [] Goals require adjustment due to lack of progress  [] Patient is not progressing as expected and requires additional follow up with physician  [] Other    Prognosis for POC: [x] Good [] Fair  [] Poor      Patient requires continued skilled intervention: [x] Yes  [] No    Treatment/Activity Tolerance:  [x] Patient able to complete treatment  [] Patient limited by fatigue  [] Patient limited by pain    [] Patient limited by other medical complications  [] Other:     Return to Play: (if applicable)   []  Stage 1: Intro to Strength   []  Stage 2: Return to Run and Strength   []  Stage 3: Return to Jump and Strength   []  Stage 4: Dynamic Strength and Agility   []  Stage 5: Sport Specific Training     []  Ready to Return to Play, Meets All Above Stages   []  Not Ready for Return to Sports   Comments:              PLAN: Pt will reduce to once week for remainder of POC as she progress with strengthening and not as reliant on manual techniques. She will also consider a transition to the [facility] program when ready for a porgression back to volleyball. [x] Continue per plan of care [] Alter current plan (see comments above)  [] Plan of care initiated [] Hold pending MD visit [] Discharge      Electronically signed by:  Zeinab Cam PT       Note: If patient does not return for scheduled/ recommended follow up visits, this note will serve as a discharge from care along with most recent update on progress.

## 2023-01-16 ENCOUNTER — APPOINTMENT (OUTPATIENT)
Dept: PHYSICAL THERAPY | Age: 38
End: 2023-01-16
Payer: COMMERCIAL

## 2023-01-20 ENCOUNTER — HOSPITAL ENCOUNTER (OUTPATIENT)
Dept: PHYSICAL THERAPY | Age: 38
Setting detail: THERAPIES SERIES
Discharge: HOME | End: 2023-01-20
Payer: COMMERCIAL

## 2023-01-20 PROCEDURE — 97530 THERAPEUTIC ACTIVITIES: CPT

## 2023-01-20 PROCEDURE — 97110 THERAPEUTIC EXERCISES: CPT

## 2023-01-20 PROCEDURE — 97112 NEUROMUSCULAR REEDUCATION: CPT

## 2023-01-20 NOTE — FLOWSHEET NOTE
The [facility] [place] [address], [place], [address]  Phone: [phone]  Fax [phone]      Date:  2023    Patient Name:  Linda Arevalo    :  1985  MRN: [id number]  Restrictions/Precautions:    Medical/Treatment Diagnosis Information:  Diagnosis: A56.802F (ICD-10-CM) - Superior labrum anterior-to-posterior (SLAP) tear of right shoulder  Treatment Diagnosis: M25. 611 R shoulder stiffness, M25.411 R shoulder effusion, M62.81 R shoulder weakness, M25.511 R shoulder pain  Insurance/Certification information:  PT Insurance Information: [facility], 90 visits hard limit, no auth  Physician Information:   Dr. Malick Robles  Has the plan of care been signed (Y/N):        []  Yes  [x]  No     Date of Patient follow up with Physician:  11/10/22      Is this a Progress Report:     []  Yes  [x]  No        If Yes:  Date Range for reporting period:  Beginnin22  Ending     Progress report will be due (10 Rx or 30 days whichever is less):        Recertification will be due (POC Duration  / 90 days whichever is less): 23      Visit # Insurance Allowable Auth Required   In-person  ()  () 90 visits (4 used already) []  Yes [x]  No    Telehealth - - []  Yes []  No    Total            Functional Scale: FOTO shoulder score: 36/100 = 36% ability, 64% disability Date assessed:  22                                 FOTO shoulder score: 59/100 = 59% ability, 41% disability on 10/17/22                       FOTO shoulder score: 68/100 = 68% ability, 32% disability on 22      Number of Comorbidities:  [x]0     []1-2    []3+    Latex Allergy:  [x]NO      []YES  Preferred Language for Healthcare:   [x]English       []other:      Pain level: 0/10    SUBJECTIVE: I have played volleyball twice without overhead serving with force.  I did not have any pain during or post. I will not do that until the  when I see the MD and update him.      OBJECTIVE:   Observation: R fwd shoulder posture persists with tight pec major and minor  Test measurements:         ROM Left AROM Right PROM Right  10/24/22 R  11/7/22 R 11/28/22 R  12/30/22   Shoulder Flex 170 100 160 deg 140 (post manual 151) 166° 170   Shoulder Abd 175 70 142 deg 125 (post manual 139) 173° 175   Shoulder ER 65 30 70 deg 40 (post manual) 42 70° 75   Shoulder IR T4  67 deg 67 deg  T10       RESTRICTIONS/PRECAUTIONS: s/p R shoulder SLAP and bicep tenodesis 9/20/22    Exercises/Interventions:     Therapeutic Ex (83789) Sets/Reps/ sec Notes/CUES   Tband scap retract rows & B ext  Tband high row (at 60deg abd)   Cued for scap dress with retract   Wall crawls: flex, scaption    Standing wand IR hoiz behind back    Standing TB ER at 45°    Prone scap retract: row,   ext,   horiz abd,   45/90 ER    Resisted ROM PNF D2 supine (~50% resistance)    Wall pec stretch  DC painful and not feel stretch   Yoga ball pec stretch   Yoga ball roll outs 10 sec 10x  10 sec 10x    Standing Y I T 2 lb weight 8x2    Standing T Ls out and front    Tball up wall 10x with 5 pulses at top    Arm Raise series to 90 deg    Arm Raise series one arm static at 90 deg other raising overhead    Tband D1 flex, D2 ext; quick    Tband 90/90 ER     Upper cut wall slides    UE walk walks  UE wall walk in upper cut    Ball on wall circles: flex, abd Red med ball   Ball on wall taps: W & clock Green med ball   Quick AROM flex,& abd    Overhead taps with stick    [place] vertical behind back    Bicep curls    Tricep pull down  Bent over tricep ext    Low trap setting overhead    B GH ER    Arm behind head stretch    Standing arm alphabet: flex & abd 2# 2 reps     Tband bicep curl + overhead press  Tband abd Green 2x10  Red 2x10    BOSU push-ups 2x5    Overhead kettlebell w/ lunges 7.5# 2x20    Lat pulldown  30# 3x10    IR behind the back lift offs 1x20        Manual Intervention (36581)    PROM: flexion, abd, IR & ER @ 45 deg and @ 90 deg    Jt Mobs: posterior  inferior: grad III    Pec minor stretch, manual    STM infraspin , teres, lat dorsi    Supine pec stretch over side of table with trigger point to pec minor    MWM S/L abd with MFR to lat and subscap    MWM S/L horiz add/abd with MFR to pec    Dry needling assessment and tx    assisted SL scap upward rotation  Resisted SL scap upward rotation Arm in abd   Abd stretch with pressure on bicep tendon    STM/trigger point:  mid trap, rhomboids, s   Seated MWM shoulder flexion, GH lateral glide & scap upward rot    Shoulder abd with [address] post glide        NMR re-education (85120) CUES NEEDED   Lat pull down stool sit Cues to prep with scap then arms, reverse on release   Supine 90° shoulder flexion  perturbations    Bird dog    plank shoulder taps     Tactile cue for SA   Low trap setting on weight machine Wall one for home   Bosu plank hold  Bosu with feet walks  Bosu with hip flexion 10 walk in and out, 10x hip flexion R and L     BOSU push-up plus    Body Dell: flex vert, abd horiz, pulse with abd    Tball roll out lift prone on table    Horz abd    Wall abd snow angels Cue to use scap and not shrug at end   Mirant with elbows extended    Elbow plank on yoga ball        Therapeutic Activity (82229)    Overhead sets with volleyball 45 sec x2    Overhead serve with volleyball 25% 2 min x2            IFC    Game Ready      Consent signed 11/21/2022 and precautions addressed. See media tab.    Needle Size Technique Notes IES   Site 1 .30u53ti [] Pistoning / []  Threading  [x]  Winding/Coning  []    Site 2 .3x40mm [] Pistoning / [x]  Threading  [x]  Winding/Coning []    Site 3 .67i08yd [] Pistoning / [x]  Threading  []  Winding/Coning Twitch response, major release []    Site 4 .85b77ld [] Pistoning / [x]  Threading  [x]  Winding/Coning Pulling skin away and using needle in horizontal to torso direction []    Site 5 .71g83wx [] Pistoning / [x]  Threading  [x]  Winding/Coning Pulling skin away and using needle in horizontal to torso direction []    Site 6  [] Pistoning / []  Threading  []  Winding/Coning  []    Site 7  [] Pistoning / []  Threading  []  Winding/Coning  []    Site 8  [] Pistoning / []  Threading  []  Winding/Coning  []    Site 9  [] Pistoning / []  Threading  []  Winding/Coning  []    Site 10  [] Pistoning / []  Threading  []  Winding/Coning  []      **The above techniques were used to restore functional range of motion, reduce muscle spasm, and induce healing in the corresponding musculature by means of intramuscular mobilization. Clean Technique was utilized today while applying the Dry needling treatment. The treatment sites where cleaned with 70% solution of isopropyl alcohol. The PT washed their hands and utilized treatment gloves along with hand  prior to inserting the needles. All needles where removed and discarded in the appropriate sharps container. MD has given verbal and/or written approval for this treatment. **          ** Educated patient on anatomy, trigger point etiology, expectations for TDN (bruising, soreness, etc), outcomes, and recommendations for exercise. **     Therapeutic Exercise and NMR EXR  [x] (95504) Provided verbal/tactile cueing for activities related to strengthening, flexibility, endurance, ROM  for improvements in scapular, scapulothoracic and UE control with self care, reaching, carrying, lifting, house/yardwork, driving/computer work.    [] (15916) Provided verbal/tactile cueing for activities related to improving balance, coordination, kinesthetic sense, posture, motor skill, proprioception  to assist with  scapular, scapulothoracic and UE control with self care, reaching, carrying, lifting, house/yardwork, driving/computer work.     Therapeutic Activities:    [] (32624 or 70324) Provided verbal/tactile cueing for activities related to improving balance, coordination, kinesthetic sense, posture, motor skill, proprioception and motor activation to allow for proper function of scapular, scapulothoracic and UE control with self care, carrying, lifting, driving/computer work. Home Exercise Program:    [x] (94256) Reviewed/Progressed HEP activities related to strengthening, flexibility, endurance, ROM of scapular, scapulothoracic and UE control with self care, reaching, carrying, lifting, house/yardwork, driving/computer work  [] (02920) Reviewed/Progressed HEP activities related to improving balance, coordination, kinesthetic sense, posture, motor skill, proprioception of scapular, scapulothoracic and UE control with self care, reaching, carrying, lifting, house/yardwork, driving/computer work      Access Code: [email or web address]  URL: [email or web address]  Date: 01/09/2023  Prepared by: Dawna Israel    Exercises  Standing Bicep Curls with Resistance Band with PLB - 1 x daily - 7 x weekly - 3 sets - 10 reps  Standing Shoulder Abduction with Resistance - 1 x daily - 7 x weekly - 3 sets - 10 reps  Shoulder Alphabet with Dumbbell - 1 x daily - 7 x weekly - 3 sets - 1 reps  Mini Lunge - 1 x daily - 7 x weekly - 3 sets - 10 reps  Push Up - 1 x daily - 7 x weekly - 3 sets - 10 reps  Seated Lat Pull Down with Resistance - Elbows Bent - 1 x daily - 7 x weekly - 3 sets - 10 reps    Manual Treatments:  PROM / STM / Oscillations-Mobs:  G-I, II, III, IV (PA's, Inf., Post.)  [x] (52697) Provided manual therapy to mobilize soft tissue/joints of cervical/CT, scapular GHJ and UE for the purpose of modulating pain, promoting relaxation,  increasing ROM, reducing/eliminating soft tissue swelling/inflammation/restriction, improving soft tissue extensibility and allowing for proper ROM for normal function with self care, reaching, carrying, lifting, house/yardwork, driving/computer work    Modalities: Game Ready to address pain and remaining inflammation    Trigger point Dry Needling:  fine needle insertion into myofascial trigger points to stimulate a healing response  [] (19067) Needle insertion without injection: 1 or 2 muscles  [] (38354) Needle insertion without injection: 3 or more muscles    Charges  Timed Code Treatment Minutes: 45   Total Treatment Minutes: 45     [] EVAL (LOW) 15019   [] EVAL (MOD) 42359   [] EVAL (HIGH) 73653   [] RE-EVAL     [x] LN(00551) x 1  [] IONTO  [x] NMR (64267) x  1 [] VASO  [] Manual (10402) x   [] Other:  [x] TA x   1   [] Mech Traction (76689)  [] ES(attended) (63943)      [] ES (un) (61553):   [] TDN needle insertion    Modalities:        [] GR/ESU 15 min    [] GR 15 min  [] ESU     [] CP    [] MHP    [] declined     GOALS:  Patient stated goal: playing overhead volleyball  [] Progressing: [] Met: [x] Not Met: [] Adjusted    Therapist goals for Patient:   Short Term Goals: To be achieved in: 2 weeks  1. Independent in HEP and progression per patient tolerance, in order to prevent re-injury. [] Progressing: [x] Met: [] Not Met: [] Adjusted  2. Patient will have a decrease in pain to facilitate improvement in movement, function, and ADLs as indicated by Functional Deficits. [] Progressing: [x] Met: [] Not Met: [] Adjusted    Long Term Goals: To be achieved in: 12 weeks  1. Disability index score of 10% or better for the FOTO shoulder score to assist with reaching prior level of function. [] Progressing: [] Met: [x] Not Met: [] Adjusted  2. Patient will demonstrate increased AROM to WNL and symmetrical to uninvolved limb to allow for proper joint functioning as indicated by patients Functional Deficits. [x] Progressing: [] Met: [] Not Met: [] Adjusted  3. Patient will demonstrate an increase in Strength to 4+/5 to allow for proper functional mobility as indicated by patients Functional Deficits. [] Progressing: [] Met: [x] Not Met: [] Adjusted  4. Patient will be able to lift a 5lbs object overhead with normal scapulohumeral rhythm without increased symptoms or restriction.    [] Progressing: [] Met: [x] Not Met: [] Adjusted  5. Pt will be able to perform UE plyometric activity in [place] or [facility] without increased symptoms or restriction to assist her in returning to an active lifestyle and PLOF. [] Progressing: [] Met: [x] Not Met: [] Adjusted     Progression Towards Functional goals:  [x] Patient is progressing as expected towards functional goals listed. [] Progression is slowed due to complexities listed. [] Progression has been slowed due to co-morbidities. [] Plan just implemented, too soon to assess goals progression  [] Other:     ASSESSMENT:  Pt has progressed with functional activities including volleyball (with some restrictions: not serving 100% overhead). Pt follows up with MD on the 31st. Pt reports that she is doing well and is pleased with the return to sports. Overall Progression Towards Functional goals/ Treatment Progress Update:  [x] Patient is progressing as expected towards functional goals listed. [] Progression is slowed due to complexities/Impairments listed. [] Progression has been slowed due to co-morbidities.   [] Plan just implemented, too soon to assess goals progression <30days   [] Goals require adjustment due to lack of progress  [] Patient is not progressing as expected and requires additional follow up with physician  [] Other    Prognosis for POC: [x] Good [] Fair  [] Poor      Patient requires continued skilled intervention: [x] Yes  [] No    Treatment/Activity Tolerance:  [x] Patient able to complete treatment  [] Patient limited by fatigue  [] Patient limited by pain    [] Patient limited by other medical complications  [] Other:     Return to Play: (if applicable)   []  Stage 1: Intro to Strength   []  Stage 2: Return to Run and Strength   []  Stage 3: Return to Jump and Strength   []  Stage 4: Dynamic Strength and Agility   [x]  Stage 5: Sport Specific Training     []  Ready to Return to Play, Meets All Above Stages   []  Not Ready for Return to Sports   Comments: PLAN: Pt will reduce to once week for remainder of POC as she progress with strengthening and not as reliant on manual techniques. She will also consider a transition to the [facility] [place] program when ready for a porgression back to volleyball. [x] Continue per plan of care [] Alter current plan (see comments above)  [] Plan of care initiated [] Hold pending MD visit [] Discharge      Electronically signed by:  Terrie Adames PT  EKATERINA Song Therapist was present, directed the patient's care, made skilled judgement, and was responsible for assessment and treatment of the patient. Note: If patient does not return for scheduled/ recommended follow up visits, this note will serve as a discharge from care along with most recent update on progress.

## 2023-01-27 ENCOUNTER — HOSPITAL ENCOUNTER (OUTPATIENT)
Dept: PHYSICAL THERAPY | Age: 38
Setting detail: THERAPIES SERIES
Discharge: HOME | End: 2023-01-27
Payer: COMMERCIAL

## 2023-01-27 PROCEDURE — 97110 THERAPEUTIC EXERCISES: CPT

## 2023-01-27 NOTE — PLAN OF CARE
[facility] and [address] [facility], [place]  [facility] [address], [address], [address]  Phone: [phone]  Fax [phone]       Physical Therapy Re-Certification Plan of Oren Orr      Dear Dr. Virginia Garcia  ,    We had the pleasure of treating the following patient for physical therapy services at [facility] and Therapy. A summary of our findings can be found in the updated assessment below. This includes our plan of care. If you have any questions or concerns regarding these findings, please do not hesitate to contact me at the office phone number checked above. Thank you for the referral.     Physician Signature:________________________________Date:__________________  By signing above (or electronic signature), therapists plan is approved by physician    ASSESSMENT:  Pt has done very well with PT. She now has normal and pain free AROM, normal scapulohumeral rhythm, some fwd posture improvement. She has good strength in shoulder girdle. She still requires occasional cueing to ensure using scap stabilizers to do overhead tasks but is aware of this and has HEP to continue to help address this. Overall she has met all PT and personal goals and will be ready for D/C with MD agreement. I have recommended our GAP program with [facility] to help her continue to transition safely fully back to sport. She said she would see what the MD says. I provided her with our handout that has all the GAP locations and info     PLAN: Pt will f/u as needed after final f/u with MD. However if that goes well then she will work independently or with [facility] and return to sport and check in with me in a few weeks for final D/C appointment.      Recommendation:    [x]Continue PT 1x in 3-4 weeks for final D/C appointment    []Hold PT, pending MD visit              Date:  2023    Patient Name:  Tawanna Chen    :  1985  MRN: [id number]  Restrictions/Precautions: Medical/Treatment Diagnosis Information:  Diagnosis: S43.431A (ICD-10-CM) - Superior labrum anterior-to-posterior (SLAP) tear of right shoulder  Treatment Diagnosis: M25. 611 R shoulder stiffness, M25.411 R shoulder effusion, M62.81 R shoulder weakness, M25.511 R shoulder pain  Insurance/Certification information:  PT Insurance Information: [place], 90 visits hard limit, no auth  Physician Information:   Dr. Solitario Joshua  Has the plan of care been signed (Y/N):        []  Yes  [x]  No     Date of Patient follow up with Physician:  11/10/22      Is this a Progress Report:     [x]  Yes  []  No        If Yes:  Date Range for reporting period:  Beginnin22  Ending 23    Progress report will be due (10 Rx or 30 days whichever is less):        Recertification will be due (POC Duration  / 90 days whichever is less):  23     Visit # Insurance Allowable Auth Required   In-person  ()  () 90 visits (4 used already) []  Yes [x]  No    Telehealth - - []  Yes []  No    Total            Functional Scale: FOTO shoulder score: 36/100 = 36% ability, 64% disability Date assessed:  22                                 FOTO shoulder score: 59/100 = 59% ability, 41% disability on 10/17/22                       FOTO shoulder score: 68/100 = 68% ability, 32% disability on 22          FOTO shoulder score: 80/100 = 80% ability, 20% disability on 23     Number of Comorbidities:  [x]0     []1-2    []3+    Latex Allergy:  [x]NO      []YES  Preferred Language for Healthcare:   [x]English       []other:      Pain level: 0/10    SUBJECTIVE: Pt feel overall about 80% back to normal. Pt feels now 100% ADLs and the remaining deficits are doing really hard level housecleaning and playing sports. OBJECTIVE:   Observation: R fwd shoulder posture persists with tight pec major and minor but much improved from eval. Pt also now has normal scapulohumeral rhythm.    Test measurements:    Shoulder MMT on 1/27/23:Flex 5/5, abd 5/5, IR 5/5, ER 5/5, horiz add 5/5, horiz abd 5/5, low trap 4/5       ROM Left AROM  EVAL Right PROM  EVAL Right  10/24/22 R 11/28/22 R  12/30/22 Right  1/27/23   Shoulder Flex 170 100 160 deg 166° 170 174 deg   Shoulder Abd 175 70 142 deg 173° 175 178 deg   Shoulder ER 65 30 70 deg 70° 75 78 deg   Shoulder IR T4  67 deg  T10 T9       RESTRICTIONS/PRECAUTIONS: s/p R shoulder SLAP and bicep tenodesis 9/20/22    Exercises/Interventions:     Therapeutic Ex (42301) Sets/Reps/ sec Notes/CUES   Tband scap retract rows & B ext  Tband high row (at 60deg abd)   Cued for scap dress with retract   Wall crawls: flex, scaption    Standing wand IR hoiz behind back    Standing TB ER at 45°    Prone scap retract: row,   ext,   horiz abd,   45/90 ER    Resisted ROM PNF D2 supine (~50% resistance)    Wall pec stretch  DC painful and not feel stretch   Yoga ball pec stretch   Yoga ball roll outs    Standing Y I T    Standing T Ls out and front    Tball up wall 10x with 5 pulses at top    Arm Raise series to 90 deg    Arm Raise series one arm static at 90 deg other raising overhead    Tband D1 flex, D2 ext; quick    Tband 90/90 ER     Upper cut wall slides    UE walk walks  UE wall walk in upper cut    Ball on wall circles: flex, abd Red med ball   Ball on wall taps: W & clock Green med ball   Quick AROM flex,& abd    Overhead taps with stick    [place] vertical behind back    Bicep curls    Tricep pull down  Bent over tricep ext    Low trap setting overhead    B GH ER    Arm behind head stretch    Standing arm alphabet: flex & abd    Tband bicep curl + overhead press  Tband abd    BOSU push-ups    Overhead kettlebell w/ lunges    Lat pulldown     IR behind the back lift offs 1x20    Praying mantis on tball 2x15    Side plank on tball w/ hip dips 2x10 R/L    Shoulder flexion Green 2x15    Shoulder horiz abd Green 2x15    Low trap setting Double black    D2 ext Green 2x5 slow 2x10 fast    Overhead press 10\" 2x10 Body Blade in D2 2x30\"    Manual Intervention ([id number])    PROM: flexion, abd, IR & ER @ 45 deg and @ 90 deg    Jt Mobs: posterior  inferior: grad III    Pec minor stretch, manual    STM infraspin , teres, lat dorsi    Supine pec stretch over side of table with trigger point to pec minor    MWM S/L abd with MFR to lat and subscap    MWM S/L horiz add/abd with MFR to pec    Dry needling assessment and tx    assisted SL scap upward rotation  Resisted SL scap upward rotation Arm in abd   Abd stretch with pressure on bicep tendon    STM/trigger point:  mid trap, rhomboids, s   Seated MWM shoulder flexion, GH lateral glide & scap upward rot    Shoulder abd with [address] post glide        NMR re-education (74773) CUES NEEDED   Lat pull down stool sit Cues to prep with scap then arms, reverse on release   Supine 90° shoulder flexion  perturbations    Bird dog    plank shoulder taps     Tactile cue for SA   Low trap setting on weight machine Wall one for home   Bosu plank hold  Bosu with feet walks  Bosu with hip flexion 10 walk in and out, 10x hip flexion R and L     BOSU push-up plus    Body Dell: flex vert, abd horiz, pulse with abd    Tball roll out lift prone on table    Horz abd    Wall abd snow angels Cue to use scap and not shrug at end   Mirant with elbows extended    Elbow plank on yoga ball        Therapeutic Activity (91059)    Overhead sets with volleyball    Overhead serve with volleyball 25%            IFC    Game Ready      Consent signed 11/21/2022 and precautions addressed. See media tab.    Needle Size Technique Notes IES   Site 1 .[id number] [] Pistoning / []  Threading  [x]  Winding/Coning  []    Site 2 .3x40mm [] Pistoning / [x]  Threading  [x]  Winding/Coning []    Site 3 .26m77cu [] Pistoning / [x]  Threading  []  Winding/Coning Twitch response, major release []    Site 4 .07l97st [] Pistoning / [x]  Threading  [x]  Winding/Coning Pulling skin away and using needle in horizontal to torso direction []    Site 5 .95u20gb [] Pistoning / [x]  Threading  [x]  Winding/Coning Pulling skin away and using needle in horizontal to torso direction []    Site 6  [] Pistoning / []  Threading  []  Winding/Coning  []    Site 7  [] Pistoning / []  Threading  []  Winding/Coning  []    Site 8  [] Pistoning / []  Threading  []  Winding/Coning  []    Site 9  [] Pistoning / []  Threading  []  Winding/Coning  []    Site 10  [] Pistoning / []  Threading  []  Winding/Coning  []      **The above techniques were used to restore functional range of motion, reduce muscle spasm, and induce healing in the corresponding musculature by means of intramuscular mobilization. Clean Technique was utilized today while applying the Dry needling treatment. The treatment sites where cleaned with 70% solution of isopropyl alcohol. The PT washed their hands and utilized treatment gloves along with hand  prior to inserting the needles. All needles where removed and discarded in the appropriate sharps container. MD has given verbal and/or written approval for this treatment. **          ** Educated patient on anatomy, trigger point etiology, expectations for TDN (bruising, soreness, etc), outcomes, and recommendations for exercise. **     Therapeutic Exercise and NMR EXR  [x] (91546) Provided verbal/tactile cueing for activities related to strengthening, flexibility, endurance, ROM  for improvements in scapular, scapulothoracic and UE control with self care, reaching, carrying, lifting, house/yardwork, driving/computer work.    [] (28070) Provided verbal/tactile cueing for activities related to improving balance, coordination, kinesthetic sense, posture, motor skill, proprioception  to assist with  scapular, scapulothoracic and UE control with self care, reaching, carrying, lifting, house/yardwork, driving/computer work.     Therapeutic Activities:    [] (91343 or ) Provided verbal/tactile cueing for activities related to improving balance, coordination, kinesthetic sense, posture, motor skill, proprioception and motor activation to allow for proper function of scapular, scapulothoracic and UE control with self care, carrying, lifting, driving/computer work. Home Exercise Program:    [x] (99331) Reviewed/Progressed HEP activities related to strengthening, flexibility, endurance, ROM of scapular, scapulothoracic and UE control with self care, reaching, carrying, lifting, house/yardwork, driving/computer work  [] (89818) Reviewed/Progressed HEP activities related to improving balance, coordination, kinesthetic sense, posture, motor skill, proprioception of scapular, scapulothoracic and UE control with self care, reaching, carrying, lifting, house/yardwork, driving/computer work      Access Code: [email or web address]  URL: [email or web address]  Date: 01/27/2023  Prepared by: Malini Lee    Exercises  Standing Single Arm Shoulder Flexion with Posterior Anchored Resistance - 1 x daily - 7 x weekly - 3 sets - 10 reps  Standing Shoulder Horizontal Abduction with Anchored Resistance - 1 x daily - 7 x weekly - 3 sets - 10 reps  Shoulder Overhead Press in Abduction with Dumbbells - 1 x daily - 7 x weekly - 3 sets - 10 reps  Standing Low Trap Setting with Resistance at [place]ov[place] - 1 x daily - 7 x weekly - 3 sets - 10 reps  Standing Shoulder Single Arm PNF D2 Flexion with Anchored Resistance - 1 x daily - 7 x weekly - 3 sets - 10 reps  Praying Mantis - 1 x daily - 7 x weekly - 3 sets - 10 reps  Side Plank on Elbow with Hip Drops on BOSU® ball - 1 x daily - 7 x weekly - 3 sets - 10 reps    Manual Treatments:  PROM / STM / Oscillations-Mobs:  G-I, II, III, IV (PA's, Inf., Post.)  [x] (28182) Provided manual therapy to mobilize soft tissue/joints of cervical/CT, scapular GHJ and UE for the purpose of modulating pain, promoting relaxation,  increasing ROM, reducing/eliminating soft tissue swelling/inflammation/restriction, improving soft tissue extensibility and allowing for proper ROM for normal function with self care, reaching, carrying, lifting, house/yardwork, driving/computer work    Modalities: Game Ready to address pain and remaining inflammation    Trigger point Dry Needling:  fine needle insertion into myofascial trigger points to stimulate a healing response  [] (09156) Needle insertion without injection: 1 or 2 muscles  [] (23990) Needle insertion without injection: 3 or more muscles    Charges  Timed Code Treatment Minutes: 45   Total Treatment Minutes: 45     [] EVAL (LOW) 39198   [] EVAL (MOD) 61699   [] EVAL (HIGH) 81429   [] RE-EVAL     [x] RY(73644) x 3  [] IONTO  [] NMR (07145) x   [] VASO  [] Manual (06544) x   [] Other:  [] TA x      [] Mech Traction (11141)  [] ES(attended) (58974)      [] ES (un) (13399):   [] TDN needle insertion    Modalities:        [] GR/ESU 15 min    [] GR 15 min  [] ESU     [] CP    [] MHP    [] declined     GOALS:  Patient stated goal: playing overhead volleyball  [x] Progressing: pt has practiced some with team with success but is waiting for final MD f/u before going back fully [] Met: [] Not Met: [] Adjusted    Therapist goals for Patient:   Short Term Goals: To be achieved in: 2 weeks  1. Independent in HEP and progression per patient tolerance, in order to prevent re-injury. [] Progressing: [x] Met: [] Not Met: [] Adjusted  2. Patient will have a decrease in pain to facilitate improvement in movement, function, and ADLs as indicated by Functional Deficits. [] Progressing: [x] Met: [] Not Met: [] Adjusted    Long Term Goals: To be achieved in: 12 weeks  1. Disability index score of 10% or better for the FOTO shoulder score to assist with reaching prior level of function. [x] Progressing: Pt scores now only 20% disability. [] Met: [] Not Met: [] Adjusted  2.  Patient will demonstrate increased AROM to WNL and symmetrical to uninvolved limb to allow for proper joint functioning as indicated by patients Functional Deficits. [] Progressing: [x] Met: [] Not Met: [] Adjusted  3. Patient will demonstrate an increase in Strength to 4+/5 to allow for proper functional mobility as indicated by patients Functional Deficits. [] Progressing: [x] Met: [] Not Met: [] Adjusted  4. Patient will be able to lift a 5lbs object overhead with normal scapulohumeral rhythm without increased symptoms or restriction. [] Progressing: [x] Met: [] Not Met: [] Adjusted  5. Pt will be able to perform UE plyometric activity in [place] or [facility] without increased symptoms or restriction to assist her in returning to an active lifestyle and PLOF. [] Progressing: [x] Met: [] Not Met: [] Adjusted     Progression Towards Functional goals:  [x] Patient is progressing as expected towards functional goals listed. [] Progression is slowed due to complexities listed. [] Progression has been slowed due to co-morbidities. [] Plan just implemented, too soon to assess goals progression  [] Other:     ASSESSMENT:  Pt has done very well with PT. She now has normal and pain free AROM, normal scapulohumeral rhythm, some fwd posture improvement. She has good strength in shoulder girdle. She still requires occasional cueing to ensure using scap stabilizers to do overhead tasks but is aware of this and has HEP to continue to help address this. Overall she has met all PT and personal goals and will be ready for D/C with MD agreement. I have recommended our GAP program with [facility] to help her continue to transition safely fully back to sport. She said she would see what the MD says. I provided her with our handout that has all the [facility] locations and info. Overall Progression Towards Functional goals/ Treatment Progress Update:  [x] Patient is progressing as expected towards functional goals listed. [] Progression is slowed due to complexities/Impairments listed. [] Progression has been slowed due to co-morbidities.   [] Plan just implemented, too soon to assess goals progression <30days   [] Goals require adjustment due to lack of progress  [] Patient is not progressing as expected and requires additional follow up with physician  [] Other    Prognosis for POC: [x] Good [] Fair  [] Poor      Patient requires continued skilled intervention: [x] Yes  [] No    Treatment/Activity Tolerance:  [x] Patient able to complete treatment  [] Patient limited by fatigue  [] Patient limited by pain    [] Patient limited by other medical complications  [] Other:     Return to Play: (if applicable)   []  Stage 1: Intro to Strength   []  Stage 2: Return to Run and Strength   []  Stage 3: Return to Jump and Strength   []  Stage 4: Dynamic Strength and Agility   []  Stage 5: Sport Specific Training     [x]  Ready to Return to Play, Meets All Above Stages   []  Not Ready for Return to Sports   Comments:              PLAN: Pt will f/u as needed after final f/u with MD. However if that goes well then she will work independently or with [employer] and return to sport and check in with me in a few weeks for final D/C appointment. [x] Continue per plan of care [] Alter current plan (see comments above)  [] Plan of care initiated [] Hold pending MD visit [] Discharge      Electronically signed by:  Gayatri Guerrero PT        Note: If patient does not return for scheduled/ recommended follow up visits, this note will serve as a discharge from care along with most recent update on progress.

## 2023-02-02 ENCOUNTER — OFFICE VISIT (OUTPATIENT)
Dept: ORTHOPEDIC SURGERY | Age: 38
End: 2023-02-02
Payer: COMMERCIAL

## 2023-02-02 VITALS — HEIGHT: 67 IN | WEIGHT: 151 LBS | BODY MASS INDEX: 23.7 KG/M2

## 2023-02-02 DIAGNOSIS — Z98.890 S/P ARTHROSCOPY OF RIGHT SHOULDER: Primary | ICD-10-CM

## 2023-02-02 PROCEDURE — G8420 CALC BMI NORM PARAMETERS: HCPCS | Performed by: ORTHOPAEDIC SURGERY

## 2023-02-02 PROCEDURE — 99212 OFFICE O/P EST SF 10 MIN: CPT | Performed by: ORTHOPAEDIC SURGERY

## 2023-02-02 PROCEDURE — G8484 FLU IMMUNIZE NO ADMIN: HCPCS | Performed by: ORTHOPAEDIC SURGERY

## 2023-02-02 PROCEDURE — G8427 DOCREV CUR MEDS BY ELIG CLIN: HCPCS | Performed by: ORTHOPAEDIC SURGERY

## 2023-02-02 PROCEDURE — 1036F TOBACCO NON-USER: CPT | Performed by: ORTHOPAEDIC SURGERY

## 2023-02-02 NOTE — PROGRESS NOTES
History of Present Illness: Juarez Fleming is a 30 y.o. female who presents for a post operative visit. The patient underwent a right arthroscopic debridement with open subpectoral biceps tenodesis on 9/20/2022. She is doing her physical therapy at the [facility]. Patient reports that she feels that her movement is improving and the shoulder does not feel as tight. Motion and strength are good. She has been playing volleyball. She denies any new injuries or setbacks since her last visit. Medical History:  Patient's medications, allergies, past medical, surgical, social and family histories were reviewed and updated as appropriate. No notes on file    Review of Systems  A 14 point review of systems was completed by the patient is available in the media section of the scanned medical record and was reviewed on 2/2/2023. Vital Signs: There were no vitals filed for this visit. General/Appearance: Alert and oriented and in no apparent distress. Skin:  There are no skin lesions, cellulitis, or extreme edema. The patient has warm and well-perfused Bilateral upper extremities with brisk capillary refill.      right Shoulder Exam:    Inspection: right shoulder incision that is clean, dry and intact and well approximated. The Prineo dressing is still in place. Mild ecchymosis and swelling are present as can be expected. There is no erythema, drainage or other signs of infection    Palpation:  No crepitus to gentle motion    Active Range of Motion: Forward elevation 170 degrees, abduction 170 degrees, external rotation of 55 degrees and internal rotation to T8     Passive Range of Motion:  same    Strength: External rotation with resistance is 5/5, internal rotation with resistance 5/5, 5/5 champagne toast test    Special Tests: No Cade muscle deformity.     Neurovascular: Sensation to light touch is intact, no motor deficits, palpable radial pulses 2+    Radiology:     Plain radiographs not obtained today. Assessment :  Ms. Erica Aguilar is a 30 y.o. patient underwent a right arthroscopic debridement with open subpectoral biceps tenodesis on 9/20/2022 doing well. Impression:  Encounter Diagnosis   Name Primary? S/P arthroscopy of right shoulder Yes         Office Procedures:  No orders of the defined types were placed in this encounter. Treatment Plan:    Overall, Erica Aguilar is doing well. Discharge from formal therapy. Continue home exercises. OK to ease back into volleyball. We will see her back as needed. She is in agreement with this plan and all of her questions were answered. 3:07 PM  2/2/23      Roosevelt Rodríguez MD  [facility] Clinical fellow      During this examination, Corona Garcia MD, functioned under the supervision and in a teaching capacity with Dr. Kiko Barrientos. This dictation was performed with a verbal recognition program (DRAGON) and it was checked for errors. It is possible that there are still dictated errors within this office note. If so, please bring any errors to my attention for an addendum. All efforts were made to ensure that this office note is accurate. This dictation was performed with a verbal recognition program (DRAGON) and it was checked for errors. It is possible that there are still dictated errors within this office note. If so, please bring any errors to my attention for an addendum. All efforts were made to ensure that this office note is accurate.  ______________________  I was physically present and personally supervised the Orthopaedic Sports Medicine Fellow in the evaluation and development of a treatment plan for this patient. I personally interviewed the patient and performed a physical examination. In addition, I discussed the patient's condition and treatment options with them. I have also reviewed and agree with the past medical, family and social history unless otherwise noted.  All of the patient's questions were answered. Ivette Palacio MD, PhD  2/2/2023

## (undated) DEVICE — GOWN,SIRUS,POLYRNF,BRTHSLV,XL,30/CS: Brand: MEDLINE

## (undated) DEVICE — SUTURE MCRYL SZ 4-0 L27IN ABSRB UD L19MM PS-2 1/2 CIR PRIM Y426H

## (undated) DEVICE — TUBING PMP L16FT MAIN DISP FOR AR-6400 AR-6475

## (undated) DEVICE — PAD DRY FLOOR ABS 32X58IN GRN

## (undated) DEVICE — SYSTEM SKIN CLSR 22CM DERMBND PRINEO

## (undated) DEVICE — GLOVE ORANGE PI 8   MSG9080

## (undated) DEVICE — BUR SHAVER 5 MMX13 CM 8 FLUT OVL FOR AGGRESSIVE BNE COOLCUT

## (undated) DEVICE — SPONGE GZ W4XL8IN COT WVN 12 PLY

## (undated) DEVICE — SUTURE VCRL SZ 3-0 L27IN ABSRB UD L26MM CT-2 1/2 CIR J232H

## (undated) DEVICE — 3M™ IOBAN™ 2 ANTIMICROBIAL INCISE DRAPE 6640EZ: Brand: IOBAN™ 2

## (undated) DEVICE — CANNULA ARTHSCP L7CM DIA7MM TRNSLUC THRD FLX W/ NO SQUIRT

## (undated) DEVICE — SHOULDER STABILIZATION KIT,                                    DISPOSABLE 12 PER BOX

## (undated) DEVICE — UNDERGLOVE SURG SZ 8 BLU LTX FREE SYN POLYISOPRENE POLYMER

## (undated) DEVICE — PUDDLEVAC FLOOR SUCTION DEVICE: Brand: PUDDLEVAC

## (undated) DEVICE — TOWEL,STOP FLAG GOLD N-W: Brand: MEDLINE

## (undated) DEVICE — TUBING FLD MGMT Y DBL SPIK DUALWAVE

## (undated) DEVICE — SUTURE PDS II SZ 1 L27IN ABSRB VLT CT-1 L36MM 1/2 CIR Z341H

## (undated) DEVICE — BLADE SHV L13CM DIA5MM EXCALIBUR AGG COOLCUT

## (undated) DEVICE — TUBING PMP L6FT CONT WAVE EXTN

## (undated) DEVICE — SOLUTION IV IRRIG LACTATED RINGERS 3000ML 2B7487

## (undated) DEVICE — GOWN,REINFRCE,POLY,ECLIPSE,SLV,3XLG: Brand: MEDLINE

## (undated) DEVICE — SUTURE FIBERWIRE SZ 2 W/ TAPERED NEEDLE BLUE L38IN NONABSORB BLU L26.5MM 1/2 CIRCLE AR7200

## (undated) DEVICE — PROBE ABLAT XL 90DEG ASPIR BPLR RF 1 PC ELECTRD ERGO HNDL

## (undated) DEVICE — SHOULDER ARTHROSCOPY: Brand: MEDLINE INDUSTRIES, INC.

## (undated) DEVICE — KIT INSTR W/ 2.4MM GUIDEPIN SUT PASS WIRE NO2 FIBERWIRE